# Patient Record
Sex: MALE | Race: WHITE | NOT HISPANIC OR LATINO | Employment: OTHER | URBAN - METROPOLITAN AREA
[De-identification: names, ages, dates, MRNs, and addresses within clinical notes are randomized per-mention and may not be internally consistent; named-entity substitution may affect disease eponyms.]

---

## 2014-03-01 LAB — EJECTION FRACTION: 63

## 2017-03-24 ENCOUNTER — TRANSFERRED RECORDS (OUTPATIENT)
Dept: HEALTH INFORMATION MANAGEMENT | Facility: CLINIC | Age: 77
End: 2017-03-24

## 2017-07-05 ENCOUNTER — DOCUMENTATION ONLY (OUTPATIENT)
Dept: LAB | Facility: CLINIC | Age: 77
End: 2017-07-05

## 2017-07-05 DIAGNOSIS — I10 ESSENTIAL HYPERTENSION WITH GOAL BLOOD PRESSURE LESS THAN 140/90: ICD-10-CM

## 2017-07-05 DIAGNOSIS — E78.5 HYPERLIPIDEMIA LDL GOAL <100: Primary | ICD-10-CM

## 2017-07-05 DIAGNOSIS — M1A.00X0 IDIOPATHIC CHRONIC GOUT WITHOUT TOPHUS, UNSPECIFIED SITE: ICD-10-CM

## 2017-07-12 DIAGNOSIS — E78.5 HYPERLIPIDEMIA LDL GOAL <100: ICD-10-CM

## 2017-07-12 DIAGNOSIS — I10 ESSENTIAL HYPERTENSION WITH GOAL BLOOD PRESSURE LESS THAN 140/90: ICD-10-CM

## 2017-07-12 DIAGNOSIS — M1A.00X0 IDIOPATHIC CHRONIC GOUT WITHOUT TOPHUS, UNSPECIFIED SITE: ICD-10-CM

## 2017-07-12 LAB
ANION GAP SERPL CALCULATED.3IONS-SCNC: 8 MMOL/L (ref 3–14)
BUN SERPL-MCNC: 17 MG/DL (ref 7–30)
CALCIUM SERPL-MCNC: 9.4 MG/DL (ref 8.5–10.1)
CHLORIDE SERPL-SCNC: 105 MMOL/L (ref 94–109)
CHOLEST SERPL-MCNC: 126 MG/DL
CO2 SERPL-SCNC: 28 MMOL/L (ref 20–32)
CREAT SERPL-MCNC: 1 MG/DL (ref 0.66–1.25)
GFR SERPL CREATININE-BSD FRML MDRD: 73 ML/MIN/1.7M2
GLUCOSE SERPL-MCNC: 91 MG/DL (ref 70–99)
HDLC SERPL-MCNC: 55 MG/DL
LDLC SERPL CALC-MCNC: 53 MG/DL
NONHDLC SERPL-MCNC: 71 MG/DL
POTASSIUM SERPL-SCNC: 3.9 MMOL/L (ref 3.4–5.3)
SODIUM SERPL-SCNC: 141 MMOL/L (ref 133–144)
TRIGL SERPL-MCNC: 92 MG/DL
URATE SERPL-MCNC: 8.2 MG/DL (ref 3.5–7.2)

## 2017-07-12 PROCEDURE — 80048 BASIC METABOLIC PNL TOTAL CA: CPT | Performed by: FAMILY MEDICINE

## 2017-07-12 PROCEDURE — 80061 LIPID PANEL: CPT | Performed by: FAMILY MEDICINE

## 2017-07-12 PROCEDURE — 84550 ASSAY OF BLOOD/URIC ACID: CPT | Performed by: FAMILY MEDICINE

## 2017-07-12 PROCEDURE — 36415 COLL VENOUS BLD VENIPUNCTURE: CPT | Performed by: FAMILY MEDICINE

## 2017-07-14 DIAGNOSIS — E78.5 HYPERLIPIDEMIA LDL GOAL <100: ICD-10-CM

## 2017-07-14 RX ORDER — ATORVASTATIN CALCIUM 40 MG/1
TABLET, FILM COATED ORAL
Qty: 90 TABLET | Refills: 2 | Status: SHIPPED | OUTPATIENT
Start: 2017-07-14 | End: 2017-07-19

## 2017-07-14 NOTE — TELEPHONE ENCOUNTER
Routing refill request to provider for review/approval because:  Plan not noted.    Notes Recorded by Cabrera Bradshaw MD on 7/13/2017 at 7:44 AM  Patient will be coming in soon for office visit and lab/test review.      Shilpa Shirley RN CPC Triage.

## 2017-07-14 NOTE — PROGRESS NOTES
SUBJECTIVE:   Samy Henry is a 77 year old male who presents for a Preventive Visit and to follow-up on some baseline health conditions.  Are you in the first 12 months of your Medicare coverage?  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 He dropped out forms yesterday from the [employer] that he wonders if I could complete. They have changed the rules now and regular doctors can complete the forms without being specifically certified for this.  He does have a  history of heart disease, but had a stress test in August of last year which he passed. He does see cardiology on a regular basis and he was cleared by them last September. He'll be seeing them again in a couple of months.    COGNITIVE SCREEN  1) Repeat 3 items (Banana, Sunrise, Chair)    2) Clock draw: NORMAL  3) 3 item recall: Recalls 3 objects  Results: 3 items recalled: COGNITIVE IMPAIRMENT LESS LIKELY    Mini-CogTM Copyright S Jos. Licensed by the author for use in [facility]; reprinted with permission (brad@.[email or web address]). All rights reserved.          Reviewed and updated as needed this visit by clinical staffTobacco  Allergies  Med Hx  Surg Hx  Fam Hx  Soc Hx        Reviewed and updated as needed this visit by Provider        Social History   Substance Use Topics     Smoking status: Former Smoker     Packs/day: 1.00     Types: Cigarettes     Start date: 1/1/1959     Quit date: 1/1/1974     Smokeless tobacco: Never Used     Alcohol use Yes      Comment: 2 glasses of wine per day        The  patient does not drink >3 drinks per day nor >7 drinks per week.          Today's PHQ-2 Score: PHQ-2 ( 1999 Pfizer) 7/14/2017   Q1: Little interest or pleasure in doing things 0   Q2: Feeling down, depressed or hopeless 0   PHQ-2 Score 0   Q1: Little interest or pleasure in doing things Not at all   Q2: Feeling down, depressed or hopeless Not at all   PHQ-2 Score 0       Do you feel safe in your environment - Yes    Do you have a Health Care Directive?: No: Advance care planning reviewed with patient; information given to patient to review.      Current providers sharing in care for this patient include: Patient Care Team:  Cabrera Bradshaw MD as PCP - General      Hearing impairment: No    Ability to successfully perform activities of daily living: Yes, no assistance needed     Fall risk:  Fallen 2 or more times in the past year?: No  Any fall with injury in the past year?: No      Home safety:  none identified      The following health maintenance items are reviewed in Epic and correct as of today:Health Maintenance   Topic Date Due     ADVANCE DIRECTIVE PLANNING Q5 YRS  06/01/2017     FALL RISK ASSESSMENT  07/18/2017     INFLUENZA VACCINE (SYSTEM ASSIGNED)  09/01/2017     BMP Q1 YR  07/12/2018     LIPID MONITORING Q1 YEAR  07/12/2018     TETANUS IMMUNIZATION (SYSTEM ASSIGNED)  05/08/2019     COLONOSCOPY Q5 YR  08/31/2020     PNEUMOCOCCAL  Completed     Patient Active Problem List   Diagnosis     Colorectal polyps     Advanced directives, counseling/discussion     Hyperlipidemia LDL goal <100     History of non-ST elevation myocardial infarction (NSTEMI)     FHx: prostate cancer     Essential hypertension with goal blood pressure less than 140/90     Idiopathic chronic gout without tophus, unspecified site     Coronary artery disease involving native coronary artery of native heart without angina pectoris     Past Surgical History:   Procedure Laterality Date     CATARACT IOL, RT/LT  06/2009    right     CATARACT  IOL, RT/LT  08/13    left     COLONOSCOPY       COLONOSCOPY N/A 8/31/2015    Procedure: COLONOSCOPY;  Surgeon: Francisco Horowitz MD;  Location: [facility] OR     COLONOSCOPY WITH CO2 INSUFFLATION N/A 8/31/2015    Procedure: COLONOSCOPY WITH CO2 INSUFFLATION;  Surgeon: Francisco Horowitz MD;  Location: [facility] OR     removal of rectal polyp  08/10     STENT, CORONARY, SAMANTA  10/13    3 coronary stents s/p NSTEMI     TONSILLECTOMY & ADENOIDECTOMY  1945     VASECTOMY  04/07/78       Social History   Substance Use Topics     Smoking status: Former Smoker     Packs/day: 1.00     Types: Cigarettes     Start date: 1/1/1959     Quit date: 1/1/1974     Smokeless tobacco: Never Used     Alcohol use Yes      Comment: 2 glasses of wine per day      Family History   Problem Relation Age of Onset     Hypertension Mother      Breast Cancer Mother      Cancer - colorectal Father      Prostate Cancer Father      CANCER Brother      lymphoma     Hypertension Sister      Neurologic Disorder Maternal Grandmother      CANCER Paternal Grandfather          Current Outpatient Prescriptions   Medication Sig Dispense Refill     atorvastatin (LIPITOR) 40 MG tablet Take 1 tablet (40 mg) by mouth daily 90 tablet 3     lisinopril-hydrochlorothiazide (PRINZIDE/ZESTORETIC) 20-25 MG per tablet Take 1 tablet by mouth daily 90 tablet 3     metoprolol (TOPROL-XL) 50 MG 24 hr tablet Take 1 tablet (50 mg) by mouth daily 90 tablet 3     indomethacin (INDOCIN) 50 MG capsule Take 1 capsule by mouth. Up to 3 times per day as needed for gout 30 capsule 1     nitroglycerin (NITROSTAT) 0.4 MG SL tablet Place under the tongue every 5 minutes as needed       ASPIRIN 81 MG PO TABS 1 TABLET DAILY       MULTI-VITAMIN PO TABS 1 TABLET DAILY       [DISCONTINUED] atorvastatin (LIPITOR) 40 MG tablet TAKE 1 TABLET (40 MG) BY ORAL ROUTE ONCE DAILY 90 tablet 2     [DISCONTINUED] lisinopril-hydrochlorothiazide (PRINZIDE,ZESTORETIC) 20-25 MG per tablet Take 1 tablet by mouth  "daily 90 tablet 3     [DISCONTINUED] metoprolol (TOPROL-XL) 50 MG 24 hr tablet Take 1 tablet (50 mg) by mouth daily 90 tablet 3     No Known Allergies        ROS:  C: NEGATIVE for fever, chills, change in weight  I: NEGATIVE for worrisome rashes, moles or lesions  E: NEGATIVE for vision changes or irritation; he had an eye exam last month, so is up-to-date with that.  E/M: NEGATIVE for ear, mouth and throat problems; no particular hearing concerns  R: NEGATIVE for significant cough or SOB  B: NEGATIVE for masses, tenderness or discharge  CV: NEGATIVE for chest pain, palpitations or peripheral edema  GI: NEGATIVE for nausea, abdominal pain, heartburn, or change in bowel habits  : NEGATIVE for frequency, dysuria, or hematuria  M: NEGATIVE for significant arthralgias or myalgia; he gets 1 or 2 gout episodes per year  N: NEGATIVE for weakness, dizziness or paresthesias  E: NEGATIVE for temperature intolerance, skin/hair changes  H: NEGATIVE for bleeding problems  P: NEGATIVE for changes in mood or affect    OBJECTIVE:   /82 (BP Location: Right arm, Patient Position: Chair, Cuff Size: Adult Regular)  Pulse 65  Temp 96.8  F (36  C) (Oral)  Ht 5' 10\" (1.778 m)  Wt 183 lb (83 kg)  SpO2 98%  BMI 26.26 kg/m2 Estimated body mass index is 26.26 kg/(m^2) as calculated from the following:    Height as of this encounter: 5' 10\" (1.778 m).    Weight as of this encounter: 183 lb (83 kg).  EXAM:   GENERAL: healthy, alert and no distress  EYES: Eyes grossly normal to inspection, PERRL and conjunctivae and sclerae normal  HENT: ear canals and TM's normal, nose and mouth without ulcers or lesions  NECK: no adenopathy, no asymmetry, masses, or scars and thyroid normal to palpation  RESP: lungs clear to auscultation - no rales, rhonchi or wheezes  CV: regular rate and rhythm, normal S1 S2, no S3 or S4, no murmur, click or rub, no peripheral edema and peripheral pulses strong  ABDOMEN: soft, nontender, no hepatosplenomegaly, " no masses    (male): normal male genitalia without lesions or urethral discharge, no hernia  RECTAL: normal sphincter tone, no rectal masses, prostate normal size, smooth, nontender without nodules or masses  MS: no gross musculoskeletal defects noted, no edema  SKIN: no suspicious lesions or rashes  NEURO: Normal strength and tone, mentation intact and speech normal  PSYCH: mentation appears normal, affect normal/bright    Orders Only on 07/12/2017   Component Date Value Ref Range Status     Sodium 07/12/2017 141  133 - 144 mmol/L Final     Potassium 07/12/2017 3.9  3.4 - 5.3 mmol/L Final     Chloride 07/12/2017 105  94 - 109 mmol/L Final     Carbon Dioxide 07/12/2017 28  20 - 32 mmol/L Final     Anion Gap 07/12/2017 8  3 - 14 mmol/L Final     Glucose 07/12/2017 91  70 - 99 mg/dL Final    Fasting specimen     Urea Nitrogen 07/12/2017 17  7 - 30 mg/dL Final     Creatinine 07/12/2017 1.00  0.66 - 1.25 mg/dL Final     GFR Estimate 07/12/2017 73  >60 mL/min/1.7m2 Final    Non  GFR Calc     GFR Estimate If Black 07/12/2017 88  >60 mL/min/1.7m2 Final    African American GFR Calc     Calcium 07/12/2017 9.4  8.5 - 10.1 mg/dL Final     Cholesterol 07/12/2017 126  <200 mg/dL Final     Triglycerides 07/12/2017 92  <150 mg/dL Final    Fasting specimen     HDL Cholesterol 07/12/2017 55  >39 mg/dL Final     LDL Cholesterol Calculated 07/12/2017 53  <100 mg/dL Final    Desirable:       <100 mg/dl     Non HDL Cholesterol 07/12/2017 71  <130 mg/dL Final     Uric Acid 07/12/2017 8.2* 3.5 - 7.2 mg/dL Final         ASSESSMENT / PLAN:       ICD-10-CM    1. Routine general medical examination at a health care facility Z00.00    2. Hyperlipidemia LDL goal <100 E78.5 atorvastatin (LIPITOR) 40 MG tablet   3. Essential hypertension with goal blood pressure less than 140/90 I10 lisinopril-hydrochlorothiazide (PRINZIDE/ZESTORETIC) 20-25 MG per tablet     metoprolol (TOPROL-XL) 50 MG 24 hr tablet   4. Coronary artery disease  "involving native coronary artery of native heart without angina pectoris I25.10    5. Hyperuricemia E79.0    6. Idiopathic chronic gout without tophus, unspecified site [id number].00X0    7. Advanced directives, counseling/discussion Z71.89      Blood pressure and other vital signs look good  Laboratory tests are all normal except for the mildly elevated uric acid         Discussed that his HCTZ could be contributing to that, but he seems to be tolerating the blood pressure well and I think is benefiting from the blood pressure lowering effect of HCTZ          Since gout has not been much of a problem for him, we will continue his same blood pressure medication          If he starts having more episodes of gout, then we may discontinue the HCTZ and increase his lisinopril dose or change him to generic Lotrel as an alternative  For now, continue same baseline meds  Continue regular physical activity  Continue routine cardiology follow-up  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  Practitioner is supportive of decision.    COUNSELING:  Reviewed preventive health counseling, as reflected in patient instructions       Regular exercise       Healthy diet/nutrition      Estimated body mass index is 26.26 kg/(m^2) as calculated from the following:    Height as of this encounter: 5' 10\" (1.778 m).    Weight as of this encounter: 183 lb (83 kg).     reports that he quit smoking about 43 years ago. His smoking use included Cigarettes. He started smoking about 58 years ago. He smoked 1.00 pack per day. He has never used smokeless tobacco.        Appropriate preventive services were discussed with this patient, including applicable screening as appropriate for cardiovascular disease, diabetes, osteopenia/osteoporosis, and glaucoma.  As appropriate for " age/gender, discussed screening for colorectal cancer, prostate cancer, breast cancer, and cervical cancer. Checklist reviewing preventive services available has been given to the patient.    Reviewed patients plan of care and provided an AVS. The Basic Care Plan (routine screening as documented in Health Maintenance) for Samy meets the Care Plan requirement. This Care Plan has been established and reviewed with the Patient.    Counseling Resources:  ATP IV Guidelines  Pooled Cohorts Equation Calculator  Breast Cancer Risk Calculator  FRAX Risk Assessment  ICSI Preventive Guidelines  Dietary Guidelines for Americans, 2010  USDA's MyPlate  ASA Prophylaxis  Lung CA Screening    Cabrera Bradshaw MD  [facility]    Answers for HPI/ROS submitted by the patient on 7/14/2017   PHQ-2 Score: 0

## 2017-07-14 NOTE — TELEPHONE ENCOUNTER
atorvastatin (LIPITOR) 40 MG tablet     Last Written Prescription Date: 7/18/16  Last Fill Quantity: 90, # refills: 3  Last Office Visit with G, P or [facility] prescribing provider: 7/18/16  Next 5 appointments (look out 90 days)     Jul 19, 2017 11:20 AM CDT   PHYSICAL with Cabrera Bradshaw MD   [facility]    [address] [place] [id number] [phone]                   Lab Results   Component Value Date    CHOL 126 07/12/2017     Lab Results   Component Value Date    HDL 55 07/12/2017     Lab Results   Component Value Date    LDL 53 07/12/2017     Lab Results   Component Value Date    TRIG 92 07/12/2017     Lab Results   Component Value Date    CHOLHDLRATIO 2.1 07/09/2015

## 2017-07-18 ENCOUNTER — TELEPHONE (OUTPATIENT)
Dept: FAMILY MEDICINE | Facility: CLINIC | Age: 77
End: 2017-07-18

## 2017-07-18 NOTE — TELEPHONE ENCOUNTER
Reason for Call:  Form, our goal is to have forms completed with 72 hours, however, some forms may require a visit or additional information.    Type of letter, form or note:  medical    Who is the form from?: Patient    Where did the form come from: Patient or family brought in       What clinic location was the form placed at?: [facility])    Where the form was placed: 's Box    What number is listed as a contact on the form?:  [phone]       Additional comments: no    Call taken on 7/18/2017 at 2:54 PM by Karla Houston

## 2017-07-19 ENCOUNTER — OFFICE VISIT (OUTPATIENT)
Dept: FAMILY MEDICINE | Facility: CLINIC | Age: 77
End: 2017-07-19
Payer: COMMERCIAL

## 2017-07-19 VITALS
DIASTOLIC BLOOD PRESSURE: 82 MMHG | TEMPERATURE: 96.8 F | BODY MASS INDEX: 26.2 KG/M2 | HEIGHT: 70 IN | WEIGHT: 183 LBS | HEART RATE: 65 BPM | OXYGEN SATURATION: 98 % | SYSTOLIC BLOOD PRESSURE: 137 MMHG

## 2017-07-19 DIAGNOSIS — Z00.00 ROUTINE GENERAL MEDICAL EXAMINATION AT A HEALTH CARE FACILITY: Primary | ICD-10-CM

## 2017-07-19 DIAGNOSIS — I10 ESSENTIAL HYPERTENSION WITH GOAL BLOOD PRESSURE LESS THAN 140/90: ICD-10-CM

## 2017-07-19 DIAGNOSIS — I25.10 CORONARY ARTERY DISEASE INVOLVING NATIVE CORONARY ARTERY OF NATIVE HEART WITHOUT ANGINA PECTORIS: ICD-10-CM

## 2017-07-19 DIAGNOSIS — M1A.00X0 IDIOPATHIC CHRONIC GOUT WITHOUT TOPHUS, UNSPECIFIED SITE: ICD-10-CM

## 2017-07-19 DIAGNOSIS — Z71.89 ADVANCED DIRECTIVES, COUNSELING/DISCUSSION: ICD-10-CM

## 2017-07-19 DIAGNOSIS — E78.5 HYPERLIPIDEMIA LDL GOAL <100: ICD-10-CM

## 2017-07-19 DIAGNOSIS — E79.0 HYPERURICEMIA: ICD-10-CM

## 2017-07-19 PROCEDURE — 99213 OFFICE O/P EST LOW 20 MIN: CPT | Mod: 25 | Performed by: FAMILY MEDICINE

## 2017-07-19 PROCEDURE — 99397 PER PM REEVAL EST PAT 65+ YR: CPT | Performed by: FAMILY MEDICINE

## 2017-07-19 RX ORDER — ATORVASTATIN CALCIUM 40 MG/1
40 TABLET, FILM COATED ORAL DAILY
Qty: 90 TABLET | Refills: 3 | Status: SHIPPED | OUTPATIENT
Start: 2017-07-19 | End: 2018-07-20

## 2017-07-19 RX ORDER — LISINOPRIL AND HYDROCHLOROTHIAZIDE 20; 25 MG/1; MG/1
1 TABLET ORAL DAILY
Qty: 90 TABLET | Refills: 3 | Status: SHIPPED | OUTPATIENT
Start: 2017-07-19 | End: 2018-07-20

## 2017-07-19 RX ORDER — METOPROLOL SUCCINATE 50 MG/1
50 TABLET, EXTENDED RELEASE ORAL DAILY
Qty: 90 TABLET | Refills: 3 | Status: SHIPPED | OUTPATIENT
Start: 2017-07-19 | End: 2018-07-20

## 2017-07-19 NOTE — MR AVS SNAPSHOT
After Visit Summary   7/19/2017    Samy Henry    MRN: [id number]           Patient Information     Date Of Birth          1940        Visit Information        Provider Department      7/19/2017 11:20 AM Cabrera Bradshaw MD [facility]        Today's Diagnoses     Routine general medical examination at a health care facility    -  1    Hyperlipidemia LDL goal <100        Essential hypertension with goal blood pressure less than 140/90        Coronary artery disease involving native coronary artery of native heart without angina pectoris        Hyperuricemia        Idiopathic chronic gout without tophus, unspecified site        Advanced directives, counseling/discussion          Care Instructions      Preventive Health Recommendations:       Male Ages 65 and over    Yearly exam:             See your health care provider every year in order to  o   Review health changes.   o   Discuss preventive care.    o   Review your medicines if your doctor has prescribed any.    Talk with your health care provider about whether you should have a test to screen for prostate cancer (PSA).    Every 3 years, have a diabetes test (fasting glucose). If you are at risk for diabetes, you should have this test more often.    Every 5 years, have a cholesterol test. Have this test more often if you are at risk for high cholesterol or heart disease.     Every 10 years, have a colonoscopy. Or, have a yearly FIT test (stool test). These exams will check for colon cancer.    Talk to with your health care provider about screening for Abdominal Aortic Aneurysm if you have a family history of AAA or have a history of smoking.  Shots:     Get a flu shot each year.     Get a tetanus shot every 10 years.     Talk to your doctor about your pneumonia vaccines. There are now two you should receive - Pneumovax (PPSV 23) and Prevnar (PCV 13).    Talk to your doctor about a shingles vaccine.     Talk to your  doctor about the hepatitis B vaccine.  Nutrition:     Eat at least 5 servings of fruits and vegetables each day.     Eat whole-grain bread, whole-wheat pasta and brown rice instead of white grains and rice.     Talk to your doctor about Calcium and Vitamin D.   Lifestyle    Exercise for at least 150 minutes a week (30 minutes a day, 5 days a week). This will help you control your weight and prevent disease.     Limit alcohol to one drink per day.     No smoking.     Wear sunscreen to prevent skin cancer.     See your dentist every six months for an exam and cleaning.     See your eye doctor every 1 to 2 years to screen for conditions such as glaucoma, macular degeneration and cataracts.          Follow-ups after your visit        Follow-up notes from your care team     Return in about 1 year (around 7/19/2018).      Who to contact     If you have questions or need follow up information about today's clinic visit or your schedule please contact [facility] directly at [phone].  Normal or non-critical lab and imaging results will be communicated to you by CardioLogshart, letter or phone within 4 business days after the clinic has received the results. If you do not hear from us within 7 days, please contact the clinic through Cabana or phone. If you have a critical or abnormal lab result, we will notify you by phone as soon as possible.  Submit refill requests through Cabana or call your pharmacy and they will forward the refill request to us. Please allow 3 business days for your refill to be completed.          Additional Information About Your Visit        Cabana Information     Cabana gives you secure access to your electronic health record. If you see a primary care provider, you can also send messages to your care team and make appointments. If you have questions, please call your primary care clinic.  If you do not have a primary care provider, please call [phone] and they will  "assist you.        Care EveryWhere ID     This is your Care EveryWhere ID. This could be used by other organizations to access your [facility] medical records  [id number]        Your Vitals Were     Pulse Temperature Height Pulse Oximetry BMI (Body Mass Index)       65 96.8  F (36  C) (Oral) 5' 10\" (1.778 m) 98% 26.26 kg/m2        Blood Pressure from Last 3 Encounters:   07/19/17 137/82   07/18/16 144/81   08/31/15 140/80    Weight from Last 3 Encounters:   07/19/17 183 lb (83 kg)   07/18/16 186 lb (84.4 kg)   07/14/15 185 lb (83.9 kg)              Today, you had the following     No orders found for display         Today's Medication Changes          These changes are accurate as of: 7/19/17 11:57 AM.  If you have any questions, ask your nurse or doctor.               These medicines have changed or have updated prescriptions.        Dose/Directions    atorvastatin 40 MG tablet   Commonly known as:  LIPITOR   This may have changed:  See the new instructions.   Used for:  Hyperlipidemia LDL goal <100   Changed by:  Cabrera Bradshaw MD        Dose:  40 mg   Take 1 tablet (40 mg) by mouth daily   Quantity:  90 tablet   Refills:  3            Where to get your medicines      These medications were sent to [facility] [address] [place], [place] [id number]     Phone:  [phone]     atorvastatin 40 MG tablet    lisinopril-hydrochlorothiazide 20-25 MG per tablet    metoprolol 50 MG 24 hr tablet                Primary Care Provider Office Phone # Fax #    Cabrera Bradshaw [phone]       [facility] [address] [facility] [address]        Equal Access to Services     [facility] DEEPIKA AH: Hadii wilma hougho Socarolyn, waaxda luqadaha, qaybta kaalmada adeegyada, maria teresa layton haychicon lisha dodson. So [facility] [phone].    ATENCIÓN: Si habla español, tiene a oneal disposición servicios gratuitos de asistencia lingüística. " Sherman seo [phone].    We comply with applicable federal civil rights laws and [place] laws. We do not discriminate on the basis of race, color, national origin, age, disability sex, sexual orientation or gender identity.            Thank you!     Thank you for choosing [facility]  for your care. Our goal is always to provide you with excellent care. Hearing back from our patients is one way we can continue to improve our services. Please take a few minutes to complete the written survey that you may receive in the mail after your visit with us. Thank you!             Your Updated Medication List - Protect others around you: Learn how to safely use, store and throw away your medicines at www.disposemymeds.org.          This list is accurate as of: 7/19/17 11:57 AM.  Always use your most recent med list.                   Brand Name Dispense Instructions for use Diagnosis    aspirin 81 MG tablet      1 TABLET DAILY        atorvastatin 40 MG tablet    LIPITOR    90 tablet    Take 1 tablet (40 mg) by mouth daily    Hyperlipidemia LDL goal <100       indomethacin 50 MG capsule    INDOCIN    30 capsule    Take 1 capsule by mouth. Up to 3 times per day as needed for gout    Idiopathic chronic gout without tophus, unspecified site       lisinopril-hydrochlorothiazide 20-25 MG per tablet    PRINZIDE/ZESTORETIC    90 tablet    Take 1 tablet by mouth daily    Essential hypertension with goal blood pressure less than 140/90       metoprolol 50 MG 24 hr tablet    TOPROL-XL    90 tablet    Take 1 tablet (50 mg) by mouth daily    Essential hypertension with goal blood pressure less than 140/90       Multi-vitamin Tabs tablet   Generic drug:  multivitamin, therapeutic with minerals      1 TABLET DAILY        nitroGLYcerin 0.4 MG sublingual tablet    NITROSTAT     Place under the tongue every 5 minutes as needed

## 2017-07-19 NOTE — NURSING NOTE
"Chief Complaint   Patient presents with     Wellness Visit       Initial /82 (BP Location: Right arm, Patient Position: Chair, Cuff Size: Adult Regular)  Pulse 65  Temp 96.8  F (36  C) (Oral)  Ht 5' 10\" (1.778 m)  Wt 183 lb (83 kg)  SpO2 98%  BMI 26.26 kg/m2 Estimated body mass index is 26.26 kg/(m^2) as calculated from the following:    Height as of this encounter: 5' 10\" (1.778 m).    Weight as of this encounter: 183 lb (83 kg).  Medication Reconciliation: complete   Ivory Best CMA       "

## 2017-08-18 ENCOUNTER — TRANSFERRED RECORDS (OUTPATIENT)
Dept: HEALTH INFORMATION MANAGEMENT | Facility: CLINIC | Age: 77
End: 2017-08-18

## 2017-10-02 ENCOUNTER — OFFICE VISIT (OUTPATIENT)
Dept: FAMILY MEDICINE | Facility: CLINIC | Age: 77
End: 2017-10-02
Payer: COMMERCIAL

## 2017-10-02 VITALS
OXYGEN SATURATION: 98 % | WEIGHT: 188 LBS | BODY MASS INDEX: 26.98 KG/M2 | HEART RATE: 60 BPM | TEMPERATURE: 97 F | DIASTOLIC BLOOD PRESSURE: 73 MMHG | SYSTOLIC BLOOD PRESSURE: 143 MMHG

## 2017-10-02 DIAGNOSIS — K14.8 HEMORRHAGE OF TONGUE: Primary | ICD-10-CM

## 2017-10-02 DIAGNOSIS — Z23 NEED FOR PROPHYLACTIC VACCINATION AND INOCULATION AGAINST INFLUENZA: ICD-10-CM

## 2017-10-02 DIAGNOSIS — I10 ESSENTIAL HYPERTENSION WITH GOAL BLOOD PRESSURE LESS THAN 140/90: ICD-10-CM

## 2017-10-02 PROCEDURE — 90662 IIV NO PRSV INCREASED AG IM: CPT | Performed by: FAMILY MEDICINE

## 2017-10-02 PROCEDURE — 99214 OFFICE O/P EST MOD 30 MIN: CPT | Mod: 25 | Performed by: FAMILY MEDICINE

## 2017-10-02 PROCEDURE — G0008 ADMIN INFLUENZA VIRUS VAC: HCPCS | Performed by: FAMILY MEDICINE

## 2017-10-02 NOTE — NURSING NOTE
"Chief Complaint   Patient presents with     Mouth Problem     Occasional bleeding of the tongue     Health Maintenance     Flu shot     Flu Shot       Initial /79 (BP Location: Right arm, Patient Position: Chair, Cuff Size: Adult Regular)  Pulse 60  Temp 97  F (36.1  C) (Oral)  Wt 188 lb (85.3 kg)  SpO2 98%  BMI 26.98 kg/m2 Estimated body mass index is 26.98 kg/(m^2) as calculated from the following:    Height as of 7/19/17: 5' 10\" (1.778 m).    Weight as of this encounter: 188 lb (85.3 kg).  Medication Reconciliation: complete   Ivory Best CMA       "

## 2017-10-02 NOTE — MR AVS SNAPSHOT
After Visit Summary   10/2/2017    Samy Henry    MRN: [id number]           Patient Information     Date Of Birth          1940        Visit Information        Provider Department      10/2/2017 10:00 AM Cabrera Bradshaw MD [facility]        Today's Diagnoses     Hemorrhage of tongue    -  1    Essential hypertension with goal blood pressure less than 140/90        Need for prophylactic vaccination and inoculation against influenza           Follow-ups after your visit        Follow-up notes from your care team     Return if symptoms worsen or fail to improve.      Who to contact     If you have questions or need follow up information about today's clinic visit or your schedule please contact [facility] directly at [phone].  Normal or non-critical lab and imaging results will be communicated to you by MyChart, letter or phone within 4 business days after the clinic has received the results. If you do not hear from us within 7 days, please contact the clinic through Octoplushart or phone. If you have a critical or abnormal lab result, we will notify you by phone as soon as possible.  Submit refill requests through Labmeeting or call your pharmacy and they will forward the refill request to us. Please allow 3 business days for your refill to be completed.          Additional Information About Your Visit        MyChart Information     Labmeeting gives you secure access to your electronic health record. If you see a primary care provider, you can also send messages to your care team and make appointments. If you have questions, please call your primary care clinic.  If you do not have a primary care provider, please call [phone] and they will assist you.        Care EveryWhere ID     This is your Care EveryWhere ID. This could be used by other organizations to access your [facility] medical records  [id number]        Your Vitals Were     Pulse  Temperature Pulse Oximetry BMI (Body Mass Index)          60 97  F (36.1  C) (Oral) 98% 26.98 kg/m2         Blood Pressure from Last 3 Encounters:   10/02/17 154/79   07/19/17 137/82   07/18/16 144/81    Weight from Last 3 Encounters:   10/02/17 188 lb (85.3 kg)   07/19/17 183 lb (83 kg)   07/18/16 186 lb (84.4 kg)              We Performed the Following     ADMIN INFLUENZA (For MEDICARE Patients ONLY) []     FLU VACCINE, INCREASED ANTIGEN, PRESV FREE, AGE 65+ [98712]        Primary Care Provider Office Phone # Fax #    Cabrera Bradshaw [phone]       [address] [facility] [address]        Equal Access to Services     IRAM POE : Hadii aad ku hadasho Socarolyn, waaxda luqadaha, qaybta kaalmada adeegyada, maria teresa johnson . So [facility] [phone].    ATENCIÓN: Si habla español, tiene a oneal disposición servicios gratuitos de asistencia lingüística. Llame al [phone].    We comply with applicable federal civil rights laws and [place] laws. We do not discriminate on the basis of race, color, national origin, age, disability, sex, sexual orientation, or gender identity.            Thank you!     Thank you for choosing [facility]  for your care. Our goal is always to provide you with excellent care. Hearing back from our patients is one way we can continue to improve our services. Please take a few minutes to complete the written survey that you may receive in the mail after your visit with us. Thank you!             Your Updated Medication List - Protect others around you: Learn how to safely use, store and throw away your medicines at www.disposemymeds.org.          This list is accurate as of: 10/2/17 10:43 AM.  Always use your most recent med list.                   Brand Name Dispense Instructions for use Diagnosis    aspirin 81 MG tablet      1 TABLET DAILY        atorvastatin 40 MG tablet    LIPITOR    90 tablet    Take 1  tablet (40 mg) by mouth daily    Hyperlipidemia LDL goal <100       indomethacin 50 MG capsule    INDOCIN    30 capsule    Take 1 capsule by mouth. Up to 3 times per day as needed for gout    Idiopathic chronic gout without tophus, unspecified site       lisinopril-hydrochlorothiazide 20-25 MG per tablet    PRINZIDE/ZESTORETIC    90 tablet    Take 1 tablet by mouth daily    Essential hypertension with goal blood pressure less than 140/90       metoprolol 50 MG 24 hr tablet    TOPROL-XL    90 tablet    Take 1 tablet (50 mg) by mouth daily    Essential hypertension with goal blood pressure less than 140/90       Multi-vitamin Tabs tablet   Generic drug:  multivitamin, therapeutic with minerals      1 TABLET DAILY        nitroGLYcerin 0.4 MG sublingual tablet    NITROSTAT     Place under the tongue every 5 minutes as needed

## 2017-10-02 NOTE — PROGRESS NOTES
SUBJECTIVE:   Samy Henry is a 77 year old male who presents to clinic today for the following health issues:      Concern - Occasional bleeding of the tongue  Onset: Saturday 9/30/17    Description:   Bleeding of the center of his tongue    Intensity: mild    Progression of Symptoms:  Happened on Saturday and again this morning    Accompanying Signs & Symptoms:  None    Previous history of similar problem:   No    Precipitating factors:   Worsened by: None    Alleviating factors:  Improved by:     Therapies Tried and outcome: None      2 days ago he noticed some bleeding from the center of his tongue. He wasn't sure exactly where it was coming from. There wasn't a particular spot on his tongue that he could see the using coming from. Yesterday it seemed fine, but then today he noticed a little bit of blood again in his mouth. He does get regular dental checkups twice a year. His oral health has generally been good. He is on low-dose aspirin daily.   He hasn't had any other bleeding issues recently from the nose, rectum, or elsewhere. No unusual signs of poor clotting from cuts or scrapes.    He hasn't had any known recent oral infection. He doesn't chew tobacco. It's been 40 years or so since he was a smoker.  He does have a history of hypertension. Home blood pressure readings have generally been good.    He hasn't had a flu shot yet this fall.    Problem list and histories reviewed & adjusted, as indicated.  Additional history: as documented    Patient Active Problem List   Diagnosis     Colorectal polyps     Advanced directives, counseling/discussion     Hyperlipidemia LDL goal <100     History of non-ST elevation myocardial infarction (NSTEMI)     FHx: prostate cancer     Essential hypertension with goal blood pressure less than 140/90     Idiopathic chronic gout without tophus, unspecified site     Coronary artery disease involving native coronary artery of native heart without angina pectoris     Past  Surgical History:   Procedure Laterality Date     CATARACT IOL, RT/LT  06/2009    right     CATARACT IOL, RT/LT  08/13    left     COLONOSCOPY       COLONOSCOPY N/A 8/31/2015    Procedure: COLONOSCOPY;  Surgeon: Francisco Horowitz MD;  Location: [facility] OR     COLONOSCOPY WITH CO2 INSUFFLATION N/A 8/31/2015    Procedure: COLONOSCOPY WITH CO2 INSUFFLATION;  Surgeon: Francisco Horowitz MD;  Location: [facility] OR     removal of rectal polyp  08/10     STENT, CORONARY, SAMANTA  10/13    3 coronary stents s/p NSTEMI     TONSILLECTOMY & ADENOIDECTOMY  1945     VASECTOMY  04/07/78       Social History   Substance Use Topics     Smoking status: Former Smoker     Packs/day: 1.00     Types: Cigarettes     Start date: 1/1/1959     Quit date: 1/1/1974     Smokeless tobacco: Never Used     Alcohol use Yes      Comment: 2 glasses of wine per day      Family History   Problem Relation Age of Onset     Hypertension Mother      Breast Cancer Mother      Cancer - colorectal Father      Prostate Cancer Father      CANCER Brother      lymphoma     Hypertension Sister      Neurologic Disorder Maternal Grandmother      CANCER Paternal Grandfather          Current Outpatient Prescriptions   Medication Sig Dispense Refill     atorvastatin (LIPITOR) 40 MG tablet Take 1 tablet (40 mg) by mouth daily 90 tablet 3     lisinopril-hydrochlorothiazide (PRINZIDE/ZESTORETIC) 20-25 MG per tablet Take 1 tablet by mouth daily 90 tablet 3     metoprolol (TOPROL-XL) 50 MG 24 hr tablet Take 1 tablet (50 mg) by mouth daily 90 tablet 3     indomethacin (INDOCIN) 50 MG capsule Take 1 capsule by mouth. Up to 3 times per day as needed for gout 30 capsule 1     nitroglycerin (NITROSTAT) 0.4 MG SL tablet Place under the tongue every 5 minutes as needed       ASPIRIN 81 MG PO TABS 1 TABLET DAILY       MULTI-VITAMIN PO TABS 1 TABLET DAILY       No Known Allergies      Reviewed and updated as needed this visit by clinical staffTobacco  Allergies  Med Hx  Surg Hx   Fam Hx  Soc Hx      Reviewed and updated as needed this visit by Provider         ROS:  C: NEGATIVE for fever, chills, change in weight  ENT/MOUTH: See above  R: NEGATIVE for significant cough or SOB  CV: NEGATIVE for chest pain, palpitations or peripheral edema    OBJECTIVE:     /73 (BP Location: Left arm, Patient Position: Chair, Cuff Size: Adult Regular)  Pulse 60  Temp 97  F (36.1  C) (Oral)  Wt 188 lb (85.3 kg)  SpO2 98%  BMI 26.98 kg/m2  Body mass index is 26.98 kg/(m^2).  GENERAL: healthy, alert and no distress  HENT: Intraoral exam currently is unremarkable. There is no bleeding anywhere identified inside of his mouth. His tongue appears clear without any apparent source of bleeding. He does have some scattered superficial blood vessels on the sides of his tongue and underneath his tongue as per usual.    Diagnostic Test Results:  none     ASSESSMENT/PLAN:       ICD-10-CM    1. Hemorrhage of tongue K14.8    2. Essential hypertension with goal blood pressure less than 140/90 I10    3. Need for prophylactic vaccination and inoculation against influenza Z23 FLU VACCINE, INCREASED ANTIGEN, PRESV FREE, AGE 65+ [08146]     ADMIN INFLUENZA (For MEDICARE Patients ONLY) []     He's had some mild bleeding from his tongue in the last couple of days which I suspect is from one of the superficial blood vessels identified  No ulcerations or signs of cancer or infection are seen          He would be at low risk for this given his lack of tobacco use  Blood pressure seems under reasonable control, so probably not a big contributing factor here  I suggested observation and expectant management           Discussed following a soft mechanical diet, perhaps with cold liquids and/or foods  If this continues to recur and persist, then we would probably refer him to ENT  We'll give him a flu shot today    If new, worsening or persistent symptoms, the patient is to call or return for a recheck (or ENT  referral)      Cabrera Bradshaw MD  [facility]      Injectable Influenza Immunization Documentation    1.  Is the person to be vaccinated sick today?   No    2. Does the person to be vaccinated have an allergy to a component   of the vaccine?   No    3. Has the person to be vaccinated ever had a serious reaction   to influenza vaccine in the past?   No    4. Has the person to be vaccinated ever had Guillain-Barré syndrome?   No    Form completed by Ivory Best CMA

## 2018-05-22 ENCOUNTER — TRANSFERRED RECORDS (OUTPATIENT)
Dept: HEALTH INFORMATION MANAGEMENT | Facility: CLINIC | Age: 78
End: 2018-05-22

## 2018-07-02 ENCOUNTER — DOCUMENTATION ONLY (OUTPATIENT)
Dept: LAB | Facility: CLINIC | Age: 78
End: 2018-07-02

## 2018-07-02 DIAGNOSIS — I10 ESSENTIAL HYPERTENSION WITH GOAL BLOOD PRESSURE LESS THAN 140/90: ICD-10-CM

## 2018-07-02 DIAGNOSIS — E78.5 HYPERLIPIDEMIA LDL GOAL <100: Primary | ICD-10-CM

## 2018-07-02 DIAGNOSIS — M1A.00X0 IDIOPATHIC CHRONIC GOUT WITHOUT TOPHUS, UNSPECIFIED SITE: ICD-10-CM

## 2018-07-02 NOTE — PROGRESS NOTES
Patient has an upcoming previsit appointment on 07/13/2018. Please review pended orders and add additional orders if needed.     Thank you,   Siria Casey

## 2018-07-13 DIAGNOSIS — E78.5 HYPERLIPIDEMIA LDL GOAL <100: ICD-10-CM

## 2018-07-13 DIAGNOSIS — I10 ESSENTIAL HYPERTENSION WITH GOAL BLOOD PRESSURE LESS THAN 140/90: ICD-10-CM

## 2018-07-13 DIAGNOSIS — M1A.00X0 IDIOPATHIC CHRONIC GOUT WITHOUT TOPHUS, UNSPECIFIED SITE: ICD-10-CM

## 2018-07-13 LAB
ALT SERPL W P-5'-P-CCNC: 38 U/L (ref 0–70)
ANION GAP SERPL CALCULATED.3IONS-SCNC: 9 MMOL/L (ref 3–14)
BUN SERPL-MCNC: 21 MG/DL (ref 7–30)
CALCIUM SERPL-MCNC: 9.2 MG/DL (ref 8.5–10.1)
CHLORIDE SERPL-SCNC: 104 MMOL/L (ref 94–109)
CHOLEST SERPL-MCNC: 117 MG/DL
CO2 SERPL-SCNC: 28 MMOL/L (ref 20–32)
CREAT SERPL-MCNC: 0.97 MG/DL (ref 0.66–1.25)
ERYTHROCYTE [DISTWIDTH] IN BLOOD BY AUTOMATED COUNT: 13.6 % (ref 10–15)
GFR SERPL CREATININE-BSD FRML MDRD: 75 ML/MIN/1.7M2
GLUCOSE SERPL-MCNC: 90 MG/DL (ref 70–99)
HCT VFR BLD AUTO: 44.7 % (ref 40–53)
HDLC SERPL-MCNC: 50 MG/DL
HGB BLD-MCNC: 15.2 G/DL (ref 13.3–17.7)
LDLC SERPL CALC-MCNC: 49 MG/DL
MCH RBC QN AUTO: 32.4 PG (ref 26.5–33)
MCHC RBC AUTO-ENTMCNC: 34 G/DL (ref 31.5–36.5)
MCV RBC AUTO: 95 FL (ref 78–100)
NONHDLC SERPL-MCNC: 67 MG/DL
PLATELET # BLD AUTO: 272 10E9/L (ref 150–450)
POTASSIUM SERPL-SCNC: 3.6 MMOL/L (ref 3.4–5.3)
RBC # BLD AUTO: 4.69 10E12/L (ref 4.4–5.9)
SODIUM SERPL-SCNC: 141 MMOL/L (ref 133–144)
TRIGL SERPL-MCNC: 92 MG/DL
URATE SERPL-MCNC: 8.2 MG/DL (ref 3.5–7.2)
WBC # BLD AUTO: 7.6 10E9/L (ref 4–11)

## 2018-07-13 PROCEDURE — 84460 ALANINE AMINO (ALT) (SGPT): CPT | Performed by: FAMILY MEDICINE

## 2018-07-13 PROCEDURE — 80061 LIPID PANEL: CPT | Performed by: FAMILY MEDICINE

## 2018-07-13 PROCEDURE — 36415 COLL VENOUS BLD VENIPUNCTURE: CPT | Performed by: FAMILY MEDICINE

## 2018-07-13 PROCEDURE — 84550 ASSAY OF BLOOD/URIC ACID: CPT | Performed by: FAMILY MEDICINE

## 2018-07-13 PROCEDURE — 85027 COMPLETE CBC AUTOMATED: CPT | Performed by: FAMILY MEDICINE

## 2018-07-13 PROCEDURE — 80048 BASIC METABOLIC PNL TOTAL CA: CPT | Performed by: FAMILY MEDICINE

## 2018-07-20 ENCOUNTER — OFFICE VISIT (OUTPATIENT)
Dept: FAMILY MEDICINE | Facility: CLINIC | Age: 78
End: 2018-07-20
Payer: COMMERCIAL

## 2018-07-20 VITALS
SYSTOLIC BLOOD PRESSURE: 162 MMHG | TEMPERATURE: 97.9 F | BODY MASS INDEX: 26.63 KG/M2 | WEIGHT: 186 LBS | DIASTOLIC BLOOD PRESSURE: 77 MMHG | HEART RATE: 57 BPM | HEIGHT: 70 IN | OXYGEN SATURATION: 97 %

## 2018-07-20 DIAGNOSIS — I10 ESSENTIAL HYPERTENSION WITH GOAL BLOOD PRESSURE LESS THAN 140/90: ICD-10-CM

## 2018-07-20 DIAGNOSIS — I25.10 CORONARY ARTERY DISEASE INVOLVING NATIVE CORONARY ARTERY OF NATIVE HEART WITHOUT ANGINA PECTORIS: ICD-10-CM

## 2018-07-20 DIAGNOSIS — M1A.00X0 IDIOPATHIC CHRONIC GOUT WITHOUT TOPHUS, UNSPECIFIED SITE: ICD-10-CM

## 2018-07-20 DIAGNOSIS — I25.2 HISTORY OF NON-ST ELEVATION MYOCARDIAL INFARCTION (NSTEMI): ICD-10-CM

## 2018-07-20 DIAGNOSIS — Z00.00 ROUTINE GENERAL MEDICAL EXAMINATION AT A HEALTH CARE FACILITY: Primary | ICD-10-CM

## 2018-07-20 DIAGNOSIS — E78.5 HYPERLIPIDEMIA LDL GOAL <100: ICD-10-CM

## 2018-07-20 PROCEDURE — G0439 PPPS, SUBSEQ VISIT: HCPCS | Performed by: FAMILY MEDICINE

## 2018-07-20 RX ORDER — ATORVASTATIN CALCIUM 40 MG/1
40 TABLET, FILM COATED ORAL DAILY
Qty: 90 TABLET | Refills: 3 | Status: SHIPPED | OUTPATIENT
Start: 2018-07-20 | End: 2019-07-23

## 2018-07-20 RX ORDER — LISINOPRIL AND HYDROCHLOROTHIAZIDE 20; 25 MG/1; MG/1
1 TABLET ORAL DAILY
Qty: 90 TABLET | Refills: 3 | Status: SHIPPED | OUTPATIENT
Start: 2018-07-20 | End: 2019-07-23

## 2018-07-20 RX ORDER — METOPROLOL SUCCINATE 50 MG/1
50 TABLET, EXTENDED RELEASE ORAL DAILY
Qty: 90 TABLET | Refills: 3 | Status: SHIPPED | OUTPATIENT
Start: 2018-07-20 | End: 2019-07-23

## 2018-07-20 ASSESSMENT — PAIN SCALES - GENERAL: PAINLEVEL: NO PAIN (0)

## 2018-07-20 ASSESSMENT — ACTIVITIES OF DAILY LIVING (ADL)
CURRENT_FUNCTION: NO ASSISTANCE NEEDED
I_NEED_ASSISTANCE_FOR_THE_FOLLOWING_DAILY_ACTIVITIES:: NO ASSISTANCE IS NEEDED

## 2018-07-20 NOTE — PROGRESS NOTES
SUBJECTIVE:   Samy Henry is a 78 year old male who presents for a Preventive Visit     Are you in the first 12 months of your Medicare coverage?  No    Physical   Annual:     Getting at least 3 servings of Calcium per day:  Yes    Bi-annual eye exam:  Yes    Dental care twice a year:  Yes    Sleep apnea or symptoms of sleep apnea:  None    Diet:  Regular (no restrictions)    Frequency of exercise:  2-3 days/week    Duration of exercise:  45-60 minutes    Taking medications regularly:  Yes    Medication side effects:  None    Additional concerns today:  No    Ability to successfully perform activities of daily living: no assistance needed    Home Safety:  Lack of grab bars in the bathroom    Hearing Impairment: no hearing concerns        Fall risk: none       COGNITIVE SCREEN  1) Repeat 3 items (Leader, Season, Table)    2) Clock draw: NORMAL  3) 3 item recall: Recalls 3 objects  Results: NORMAL clock, 1-2 items recalled: COGNITIVE IMPAIRMENT LESS LIKELY    Mini-CogTM Copyright S Jos. Licensed by the author for use in [facility]; reprinted with permission (brad@[email or web address]). All rights reserved.        Reviewed and updated as needed this visit by clinical staff  Tobacco  Allergies  Meds  Med Hx  Surg Hx  Fam Hx  Soc Hx        Reviewed and updated as needed this visit by Provider        Social History   Substance Use Topics     Smoking status: Former Smoker     Packs/day: 1.00     Types: Cigarettes     Start date: 1/1/1959     Quit date: 1/1/1974     Smokeless tobacco: Never Used     Alcohol use Yes      Comment: 2 glasses of wine per day        Alcohol Use 7/20/2018   If you drink alcohol do you typically have greater than 3 drinks per day OR greater than 7 drinks per week? No       Today's PHQ-2 Score:   PHQ-2 ( 1999 Pfizer) 7/20/2018   Q1: Little interest or pleasure in doing things 0   Q2: Feeling down, depressed or hopeless 0   PHQ-2 Score 0   Q1: Little interest or pleasure in doing  things Not at all   Q2: Feeling down, depressed or hopeless Not at all   PHQ-2 Score 0       Do you feel safe in your environment - Yes    Do you have a Health Care Directive?: Yes: Patient states has Advance Directive and will bring in a copy to clinic.    Current providers sharing in care for this patient include:   Patient Care Team:  Cabrera Bradshaw MD as PCP - General    The following health maintenance items are reviewed in Epic and correct as of today:  Health Maintenance   Topic Date Due     FALL RISK ASSESSMENT  07/19/2018     PHQ-2 Q1 YR  07/19/2018     INFLUENZA VACCINE (1) 09/01/2018     TETANUS IMMUNIZATION (SYSTEM ASSIGNED)  05/08/2019     BMP Q1 YR  07/13/2019     LIPID MONITORING Q1 YEAR  07/13/2019     COLONOSCOPY Q5 YR  08/31/2020     ADVANCE DIRECTIVE PLANNING Q5 YRS  07/19/2022     PNEUMOCOCCAL  Completed     Patient Active Problem List   Diagnosis     Colorectal polyps     Advanced directives, counseling/discussion     Hyperlipidemia LDL goal <100     History of non-ST elevation myocardial infarction (NSTEMI)     FHx: prostate cancer     Essential hypertension with goal blood pressure less than 140/90     Idiopathic chronic gout without tophus, unspecified site     Coronary artery disease involving native coronary artery of native heart without angina pectoris     Past Surgical History:   Procedure Laterality Date     CATARACT IOL, RT/LT  06/2009    right     CATARACT IOL, RT/LT  08/13    left     COLONOSCOPY       COLONOSCOPY N/A 8/31/2015    Procedure: COLONOSCOPY;  Surgeon: Francisco Horowitz MD;  Location: [facility] OR     COLONOSCOPY WITH CO2 INSUFFLATION N/A 8/31/2015    Procedure: COLONOSCOPY WITH CO2 INSUFFLATION;  Surgeon: Francisco Horowitz MD;  Location: [facility] OR     removal of rectal polyp  08/10     STENT, CORONARY, SAMANTA  10/13    3 coronary stents s/p NSTEMI     TONSILLECTOMY & ADENOIDECTOMY  1945     VASECTOMY  04/07/78       Social History   Substance Use Topics     Smoking  status: Former Smoker     Packs/day: 1.00     Types: Cigarettes     Start date: 1/1/1959     Quit date: 1/1/1974     Smokeless tobacco: Never Used     Alcohol use Yes      Comment: 2 glasses of wine per day      Family History   Problem Relation Age of Onset     Hypertension Mother      Breast Cancer Mother      Cancer - colorectal Father      Prostate Cancer Father      Cancer Brother      lymphoma     Hypertension Sister      Neurologic Disorder Maternal Grandmother      Cancer Paternal Grandfather          Current Outpatient Prescriptions   Medication Sig Dispense Refill     ASPIRIN 81 MG PO TABS 1 TABLET DAILY       atorvastatin (LIPITOR) 40 MG tablet Take 1 tablet (40 mg) by mouth daily 90 tablet 3     lisinopril-hydrochlorothiazide (PRINZIDE/ZESTORETIC) 20-25 MG per tablet Take 1 tablet by mouth daily 90 tablet 3     metoprolol succinate (TOPROL-XL) 50 MG 24 hr tablet Take 1 tablet (50 mg) by mouth daily 90 tablet 3     MULTI-VITAMIN PO TABS 1 TABLET DAILY       indomethacin (INDOCIN) 50 MG capsule Take 1 capsule by mouth. Up to 3 times per day as needed for gout (Patient not taking: Reported on 7/20/2018) 30 capsule 1     nitroglycerin (NITROSTAT) 0.4 MG SL tablet Place under the tongue every 5 minutes as needed       [DISCONTINUED] atorvastatin (LIPITOR) 40 MG tablet Take 1 tablet (40 mg) by mouth daily 90 tablet 3     [DISCONTINUED] lisinopril-hydrochlorothiazide (PRINZIDE/ZESTORETIC) 20-25 MG per tablet Take 1 tablet by mouth daily 90 tablet 3     [DISCONTINUED] metoprolol (TOPROL-XL) 50 MG 24 hr tablet Take 1 tablet (50 mg) by mouth daily 90 tablet 3     No Known Allergies    He remains on baseline medications as listed above.  He has generally been feeling well.  He is not having any angina.  He rarely has any gout issues.  He has indomethacin at home, but is only taken 1 capsule in the last year.  He is still physically active and still works as a .    Review of Systems  CONSTITUTIONAL:  "NEGATIVE for fever, chills, change in weight  INTEGUMENTARY/SKIN: NEGATIVE for worrisome rashes, moles or lesions  EYES: NEGATIVE for vision changes or irritation  ENT/MOUTH: NEGATIVE for ear, mouth and throat problems  RESP: NEGATIVE for significant cough or SOB  BREAST: NEGATIVE for masses, tenderness or discharge  CV: NEGATIVE for chest pain, palpitations or peripheral edema  GI: NEGATIVE for nausea, abdominal pain, heartburn, or change in bowel habits  : NEGATIVE for frequency, dysuria, or hematuria  MUSCULOSKELETAL: NEGATIVE for significant arthralgias or myalgia  NEURO: NEGATIVE for weakness, dizziness or paresthesias  ENDOCRINE: NEGATIVE for temperature intolerance, skin/hair changes  HEME: NEGATIVE for bleeding problems  PSYCHIATRIC: NEGATIVE for changes in mood or affect    OBJECTIVE:   /83 (BP Location: Right arm, Patient Position: Chair, Cuff Size: Adult Regular)  Pulse 58  Temp 97.9  F (36.6  C) (Oral)  Ht 5' 10\" (1.778 m)  Wt 186 lb (84.4 kg)  SpO2 97%  BMI 26.69 kg/m2 Estimated body mass index is 26.69 kg/(m^2) as calculated from the following:    Height as of this encounter: 5' 10\" (1.778 m).    Weight as of this encounter: 186 lb (84.4 kg).  Physical Exam  GENERAL: healthy, alert and no distress  EYES: Eyes grossly normal to inspection, PERRL and conjunctivae and sclerae normal  HENT: ear canals and TM's normal, nose and mouth without ulcers or lesions  NECK: no adenopathy, no asymmetry, masses, or scars and thyroid normal to palpation  RESP: lungs clear to auscultation - no rales, rhonchi or wheezes  CV: regular rate and rhythm, normal S1 S2, no S3 or S4, no murmur, click or rub, no peripheral edema and peripheral pulses strong  ABDOMEN: soft, nontender, no hepatosplenomegaly, no masses   MS: no gross musculoskeletal defects noted, no edema  SKIN: no suspicious lesions or rashes  NEURO: Normal strength and tone, mentation intact and speech normal  PSYCH: mentation appears normal, " affect normal/bright    Diagnostic Test Results:  Orders Only on 07/13/2018   Component Date Value Ref Range Status     Sodium 07/13/2018 141  133 - 144 mmol/L Final     Potassium 07/13/2018 3.6  3.4 - 5.3 mmol/L Final     Chloride 07/13/2018 104  94 - 109 mmol/L Final     Carbon Dioxide 07/13/2018 28  20 - 32 mmol/L Final     Anion Gap 07/13/2018 9  3 - 14 mmol/L Final     Glucose 07/13/2018 90  70 - 99 mg/dL Final    Fasting specimen     Urea Nitrogen 07/13/2018 21  7 - 30 mg/dL Final     Creatinine 07/13/2018 0.97  0.66 - 1.25 mg/dL Final     GFR Estimate 07/13/2018 75  >60 mL/min/1.7m2 Final    Non  GFR Calc     GFR Estimate If Black 07/13/2018 >90  >60 mL/min/1.7m2 Final    African American GFR Calc     Calcium 07/13/2018 9.2  8.5 - 10.1 mg/dL Final     Cholesterol 07/13/2018 117  <200 mg/dL Final     Triglycerides 07/13/2018 92  <150 mg/dL Final    Fasting specimen     HDL Cholesterol 07/13/2018 50  >39 mg/dL Final     LDL Cholesterol Calculated 07/13/2018 49  <100 mg/dL Final    Desirable:       <100 mg/dl     Non HDL Cholesterol 07/13/2018 67  <130 mg/dL Final     ALT 07/13/2018 38  0 - 70 U/L Final     Uric Acid 07/13/2018 8.2* 3.5 - 7.2 mg/dL Final     WBC 07/13/2018 7.6  4.0 - 11.0 10e9/L Final     RBC Count 07/13/2018 4.69  4.4 - 5.9 10e12/L Final     Hemoglobin 07/13/2018 15.2  13.3 - 17.7 g/dL Final     Hematocrit 07/13/2018 44.7  40.0 - 53.0 % Final     MCV 07/13/2018 95  78 - 100 fl Final     MCH 07/13/2018 32.4  26.5 - 33.0 pg Final     MCHC 07/13/2018 34.0  31.5 - 36.5 g/dL Final     RDW 07/13/2018 13.6  10.0 - 15.0 % Final     Platelet Count 07/13/2018 272  150 - 450 10e9/L Final         ASSESSMENT / PLAN:       ICD-10-CM    1. Routine general medical examination at a health care facility Z00.00    2. Hyperlipidemia LDL goal <100 E78.5 atorvastatin (LIPITOR) 40 MG tablet   3. Essential hypertension with goal blood pressure less than 140/90 I10 lisinopril-hydrochlorothiazide  "(PRINZIDE/ZESTORETIC) 20-25 MG per tablet     metoprolol succinate (TOPROL-XL) 50 MG 24 hr tablet   4. History of non-ST elevation myocardial infarction (NSTEMI) I25.2    5. Coronary artery disease involving native coronary artery of native heart without angina pectoris I25.10    6. 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  Practitioner is supportive of decision.    COUNSELING:  Reviewed preventive health counseling, as reflected in patient instructions       Regular exercise       Healthy diet/nutrition    BP Readings from Last 1 Encounters:   07/20/18 150/83     Estimated body mass index is 26.69 kg/(m^2) as calculated from the following:    Height as of this encounter: 5' 10\" (1.778 m).    Weight as of this encounter: 186 lb (84.4 kg).           reports that he quit smoking about 44 years ago. His smoking use included Cigarettes. He started smoking about 59 years ago. He smoked 1.00 pack per day. He has never used smokeless tobacco.      Appropriate preventive services were discussed with this patient, including applicable screening as appropriate for cardiovascular disease, diabetes, osteopenia/osteoporosis, and glaucoma.  As appropriate for age/gender, discussed screening for colorectal cancer, prostate cancer, breast cancer, and cervical cancer. Checklist " reviewing preventive services available has been given to the patient.    Reviewed patients plan of care and provided an AVS. The Basic Care Plan (routine screening as documented in Health Maintenance) for Samy meets the Care Plan requirement. This Care Plan has been established and reviewed with the Patient.    Counseling Resources:  ATP IV Guidelines  Pooled Cohorts Equation Calculator  Breast Cancer Risk Calculator  FRAX Risk Assessment  ICSI Preventive Guidelines  Dietary Guidelines for Americans, 2010  USDA's MyPlate  ASA Prophylaxis  Lung CA Screening    Cabrera Bradshaw MD  [facility]    Answers for HPI/ROS submitted by the patient on 7/20/2018   PHQ-2 Score: 0

## 2018-07-20 NOTE — MR AVS SNAPSHOT
After Visit Summary   7/20/2018    Samy Henry    MRN: [id number]           Patient Information     Date Of Birth          1940        Visit Information        Provider Department      7/20/2018 10:40 AM Cabrera Bradshaw MD [facility]        Today's Diagnoses     Routine general medical examination at a health care facility    -  1    Hyperlipidemia LDL goal <100        Essential hypertension with goal blood pressure less than 140/90        History of non-ST elevation myocardial infarction (NSTEMI)        Coronary artery disease involving native coronary artery of native heart without angina pectoris        Idiopathic chronic gout without tophus, unspecified site          Care Instructions      Preventive Health Recommendations:       Male Ages 65 and over    Yearly exam:             See your health care provider every year in order to  o   Review health changes.   o   Discuss preventive care.    o   Review your medicines if your doctor has prescribed any.    Talk with your health care provider about whether you should have a test to screen for prostate cancer (PSA).    Every 3 years, have a diabetes test (fasting glucose). If you are at risk for diabetes, you should have this test more often.    Every 5 years, have a cholesterol test. Have this test more often if you are at risk for high cholesterol or heart disease.     Every 10 years, have a colonoscopy. Or, have a yearly FIT test (stool test). These exams will check for colon cancer.    Talk to with your health care provider about screening for Abdominal Aortic Aneurysm if you have a family history of AAA or have a history of smoking.  Shots:     Get a flu shot each year.     Get a tetanus shot every 10 years.     Talk to your doctor about your pneumonia vaccines. There are now two you should receive - Pneumovax (PPSV 23) and Prevnar (PCV 13).    Talk to your pharmacist about a shingles vaccine.     Talk to your  doctor about the hepatitis B vaccine.  Nutrition:     Eat at least 5 servings of fruits and vegetables each day.     Eat whole-grain bread, whole-wheat pasta and brown rice instead of white grains and rice.     Get adequate Calcium and Vitamin D.   Lifestyle    Exercise for at least 150 minutes a week (30 minutes a day, 5 days a week). This will help you control your weight and prevent disease.     Limit alcohol to one drink per day.     No smoking.     Wear sunscreen to prevent skin cancer.     See your dentist every six months for an exam and cleaning.     See your eye doctor every 1 to 2 years to screen for conditions such as glaucoma, macular degeneration and cataracts.          Follow-ups after your visit        Follow-up notes from your care team     Return in about 1 year (around 7/20/2019).      Who to contact     If you have questions or need follow up information about today's clinic visit or your schedule please contact [facility] directly at [phone].  Normal or non-critical lab and imaging results will be communicated to you by [employer], letter or phone within 4 business days after the clinic has received the results. If you do not hear from us within 7 days, please contact the clinic through [employer] or phone. If you have a critical or abnormal lab result, we will notify you by phone as soon as possible.  Submit refill requests through [employer] or call your pharmacy and they will forward the refill request to us. Please allow 3 business days for your refill to be completed.          Additional Information About Your Visit        [employer] Information     [employer] gives you secure access to your electronic health record. If you see a primary care provider, you can also send messages to your care team and make appointments. If you have questions, please call your primary care clinic.  If you do not have a primary care provider, please call [phone] and they will assist you.       "  Care EveryWhere ID     This is your Care EveryWhere ID. This could be used by other organizations to access your [facility] medical records  [id number]        Your Vitals Were     Pulse Temperature Height Pulse Oximetry BMI (Body Mass Index)       58 97.9  F (36.6  C) (Oral) 5' 10\" (1.778 m) 97% 26.69 kg/m2        Blood Pressure from Last 3 Encounters:   07/20/18 150/83   10/02/17 143/73   07/19/17 137/82    Weight from Last 3 Encounters:   07/20/18 186 lb (84.4 kg)   10/02/17 188 lb (85.3 kg)   07/19/17 183 lb (83 kg)              Today, you had the following     No orders found for display         Where to get your medicines      These medications were sent to [facility] [address] [address]     Phone:  [phone]     atorvastatin 40 MG tablet    lisinopril-hydrochlorothiazide 20-25 MG per tablet    metoprolol succinate 50 MG 24 hr tablet          Primary Care Provider Office Phone # Fax #    Cabrera Bradshaw [phone]       [address] [facility] [address]        Equal Access to Services     IRAM POE : Hadii aad ku hadasho Soomaali, waaxda luqadaha, qaybta kaalmada adeegyada, waxay idiin haychicon lisha muñoz laluz dodson. So [facility] [phone].    ATENCIÓN: Si habla español, tiene a oneal disposición servicios gratuitos de asistencia lingüística. Llame al [phone].    We comply with applicable federal civil rights laws and [place] laws. We do not discriminate on the basis of race, color, national origin, age, disability, sex, sexual orientation, or gender identity.            Thank you!     Thank you for choosing [facility]  for your care. Our goal is always to provide you with excellent care. Hearing back from our patients is one way we can continue to improve our services. Please take a few minutes to complete the written survey that you may receive in the mail after your visit " with us. Thank you!             Your Updated Medication List - Protect others around you: Learn how to safely use, store and throw away your medicines at www.disposemymeds.org.          This list is accurate as of 7/20/18 11:19 AM.  Always use your most recent med list.                   Brand Name Dispense Instructions for use Diagnosis    aspirin 81 MG tablet      1 TABLET DAILY        atorvastatin 40 MG tablet    LIPITOR    90 tablet    Take 1 tablet (40 mg) by mouth daily    Hyperlipidemia LDL goal <100       indomethacin 50 MG capsule    INDOCIN    30 capsule    Take 1 capsule by mouth. Up to 3 times per day as needed for gout    Idiopathic chronic gout without tophus, unspecified site       lisinopril-hydrochlorothiazide 20-25 MG per tablet    PRINZIDE/ZESTORETIC    90 tablet    Take 1 tablet by mouth daily    Essential hypertension with goal blood pressure less than 140/90       metoprolol succinate 50 MG 24 hr tablet    TOPROL-XL    90 tablet    Take 1 tablet (50 mg) by mouth daily    Essential hypertension with goal blood pressure less than 140/90       Multi-vitamin Tabs tablet   Generic drug:  multivitamin, therapeutic with minerals      1 TABLET DAILY        nitroGLYcerin 0.4 MG sublingual tablet    NITROSTAT     Place under the tongue every 5 minutes as needed

## 2018-08-24 ENCOUNTER — TRANSFERRED RECORDS (OUTPATIENT)
Dept: HEALTH INFORMATION MANAGEMENT | Facility: CLINIC | Age: 78
End: 2018-08-24

## 2019-06-10 ENCOUNTER — TRANSFERRED RECORDS (OUTPATIENT)
Dept: HEALTH INFORMATION MANAGEMENT | Facility: CLINIC | Age: 79
End: 2019-06-10

## 2019-06-24 ENCOUNTER — DOCUMENTATION ONLY (OUTPATIENT)
Dept: FAMILY MEDICINE | Facility: CLINIC | Age: 79
End: 2019-06-24

## 2019-06-24 DIAGNOSIS — E78.5 HYPERLIPIDEMIA LDL GOAL <100: Primary | ICD-10-CM

## 2019-06-24 DIAGNOSIS — I10 ESSENTIAL HYPERTENSION WITH GOAL BLOOD PRESSURE LESS THAN 140/90: ICD-10-CM

## 2019-06-24 DIAGNOSIS — M1A.00X0 IDIOPATHIC CHRONIC GOUT WITHOUT TOPHUS, UNSPECIFIED SITE: ICD-10-CM

## 2019-07-16 DIAGNOSIS — E78.5 HYPERLIPIDEMIA LDL GOAL <100: ICD-10-CM

## 2019-07-16 DIAGNOSIS — I10 ESSENTIAL HYPERTENSION WITH GOAL BLOOD PRESSURE LESS THAN 140/90: ICD-10-CM

## 2019-07-16 DIAGNOSIS — M1A.00X0 IDIOPATHIC CHRONIC GOUT WITHOUT TOPHUS, UNSPECIFIED SITE: ICD-10-CM

## 2019-07-16 LAB
ALT SERPL W P-5'-P-CCNC: 40 U/L (ref 0–70)
ANION GAP SERPL CALCULATED.3IONS-SCNC: 6 MMOL/L (ref 3–14)
BUN SERPL-MCNC: 17 MG/DL (ref 7–30)
CALCIUM SERPL-MCNC: 8.8 MG/DL (ref 8.5–10.1)
CHLORIDE SERPL-SCNC: 107 MMOL/L (ref 94–109)
CHOLEST SERPL-MCNC: 120 MG/DL
CO2 SERPL-SCNC: 30 MMOL/L (ref 20–32)
CREAT SERPL-MCNC: 0.93 MG/DL (ref 0.66–1.25)
ERYTHROCYTE [DISTWIDTH] IN BLOOD BY AUTOMATED COUNT: 13.9 % (ref 10–15)
GFR SERPL CREATININE-BSD FRML MDRD: 78 ML/MIN/{1.73_M2}
GLUCOSE SERPL-MCNC: 86 MG/DL (ref 70–99)
HCT VFR BLD AUTO: 42.6 % (ref 40–53)
HDLC SERPL-MCNC: 48 MG/DL
HGB BLD-MCNC: 14.3 G/DL (ref 13.3–17.7)
LDLC SERPL CALC-MCNC: 54 MG/DL
MCH RBC QN AUTO: 32.6 PG (ref 26.5–33)
MCHC RBC AUTO-ENTMCNC: 33.6 G/DL (ref 31.5–36.5)
MCV RBC AUTO: 97 FL (ref 78–100)
NONHDLC SERPL-MCNC: 72 MG/DL
PLATELET # BLD AUTO: 257 10E9/L (ref 150–450)
POTASSIUM SERPL-SCNC: 3.7 MMOL/L (ref 3.4–5.3)
RBC # BLD AUTO: 4.38 10E12/L (ref 4.4–5.9)
SODIUM SERPL-SCNC: 143 MMOL/L (ref 133–144)
TRIGL SERPL-MCNC: 91 MG/DL
URATE SERPL-MCNC: 7.6 MG/DL (ref 3.5–7.2)
WBC # BLD AUTO: 7.2 10E9/L (ref 4–11)

## 2019-07-16 PROCEDURE — 80048 BASIC METABOLIC PNL TOTAL CA: CPT | Performed by: FAMILY MEDICINE

## 2019-07-16 PROCEDURE — 36415 COLL VENOUS BLD VENIPUNCTURE: CPT | Performed by: FAMILY MEDICINE

## 2019-07-16 PROCEDURE — 80061 LIPID PANEL: CPT | Performed by: FAMILY MEDICINE

## 2019-07-16 PROCEDURE — 85027 COMPLETE CBC AUTOMATED: CPT | Performed by: FAMILY MEDICINE

## 2019-07-16 PROCEDURE — 84460 ALANINE AMINO (ALT) (SGPT): CPT | Performed by: FAMILY MEDICINE

## 2019-07-16 PROCEDURE — 84550 ASSAY OF BLOOD/URIC ACID: CPT | Performed by: FAMILY MEDICINE

## 2019-07-23 ENCOUNTER — OFFICE VISIT (OUTPATIENT)
Dept: FAMILY MEDICINE | Facility: CLINIC | Age: 79
End: 2019-07-23
Payer: COMMERCIAL

## 2019-07-23 VITALS
HEART RATE: 65 BPM | TEMPERATURE: 97.6 F | DIASTOLIC BLOOD PRESSURE: 72 MMHG | BODY MASS INDEX: 26.48 KG/M2 | OXYGEN SATURATION: 96 % | SYSTOLIC BLOOD PRESSURE: 134 MMHG | WEIGHT: 185 LBS | HEIGHT: 70 IN

## 2019-07-23 DIAGNOSIS — Z23 NEED FOR PROPHYLACTIC VACCINATION WITH TETANUS-DIPHTHERIA (TD): ICD-10-CM

## 2019-07-23 DIAGNOSIS — Z00.00 ROUTINE GENERAL MEDICAL EXAMINATION AT A HEALTH CARE FACILITY: Primary | ICD-10-CM

## 2019-07-23 DIAGNOSIS — I25.10 CORONARY ARTERY DISEASE INVOLVING NATIVE CORONARY ARTERY OF NATIVE HEART WITHOUT ANGINA PECTORIS: ICD-10-CM

## 2019-07-23 DIAGNOSIS — I10 ESSENTIAL HYPERTENSION WITH GOAL BLOOD PRESSURE LESS THAN 140/90: ICD-10-CM

## 2019-07-23 DIAGNOSIS — E78.5 HYPERLIPIDEMIA LDL GOAL <100: ICD-10-CM

## 2019-07-23 PROCEDURE — 90471 IMMUNIZATION ADMIN: CPT | Performed by: FAMILY MEDICINE

## 2019-07-23 PROCEDURE — 99213 OFFICE O/P EST LOW 20 MIN: CPT | Mod: 25 | Performed by: FAMILY MEDICINE

## 2019-07-23 PROCEDURE — 99397 PER PM REEVAL EST PAT 65+ YR: CPT | Mod: 25 | Performed by: FAMILY MEDICINE

## 2019-07-23 PROCEDURE — 90714 TD VACC NO PRESV 7 YRS+ IM: CPT | Performed by: FAMILY MEDICINE

## 2019-07-23 RX ORDER — ATORVASTATIN CALCIUM 40 MG/1
40 TABLET, FILM COATED ORAL DAILY
Qty: 90 TABLET | Refills: 3 | Status: SHIPPED | OUTPATIENT
Start: 2019-07-23 | End: 2020-08-21

## 2019-07-23 RX ORDER — METOPROLOL SUCCINATE 50 MG/1
50 TABLET, EXTENDED RELEASE ORAL DAILY
Qty: 90 TABLET | Refills: 3 | Status: SHIPPED | OUTPATIENT
Start: 2019-07-23 | End: 2020-07-31

## 2019-07-23 RX ORDER — LISINOPRIL AND HYDROCHLOROTHIAZIDE 20; 25 MG/1; MG/1
1 TABLET ORAL DAILY
Qty: 90 TABLET | Refills: 3 | Status: SHIPPED | OUTPATIENT
Start: 2019-07-23 | End: 2020-08-21 | Stop reason: ALTCHOICE

## 2019-07-23 RX ORDER — NITROGLYCERIN 0.4 MG/1
0.4 TABLET SUBLINGUAL EVERY 5 MIN PRN
Qty: 25 TABLET | Refills: 1 | Status: SHIPPED | OUTPATIENT
Start: 2019-07-23 | End: 2020-08-21

## 2019-07-23 ASSESSMENT — ENCOUNTER SYMPTOMS
DYSURIA: 0
FEVER: 0
HEADACHES: 0
ARTHRALGIAS: 0
WEAKNESS: 0
MYALGIAS: 0
SORE THROAT: 0
HEMATOCHEZIA: 0
ABDOMINAL PAIN: 0
SHORTNESS OF BREATH: 0
JOINT SWELLING: 0
NERVOUS/ANXIOUS: 0
COUGH: 0
PARESTHESIAS: 0
EYE PAIN: 0
HEMATURIA: 0
DIZZINESS: 0
CONSTIPATION: 0
PALPITATIONS: 0
FREQUENCY: 0
DIARRHEA: 0
HEARTBURN: 0
NAUSEA: 0
CHILLS: 0

## 2019-07-23 ASSESSMENT — MIFFLIN-ST. JEOR: SCORE: 1565.4

## 2019-07-23 ASSESSMENT — ACTIVITIES OF DAILY LIVING (ADL): CURRENT_FUNCTION: NO ASSISTANCE NEEDED

## 2019-07-23 NOTE — NURSING NOTE
Screening Questionnaire for Adult Immunization    Are you sick today?   No   Do you have allergies to medications, food, a vaccine component or latex?   No   Have you ever had a serious reaction after receiving a vaccination?   No   Do you have a long-term health problem with heart disease, lung disease, asthma, kidney disease, metabolic disease (e.g. diabetes), anemia, or other blood disorder?   No   Do you have cancer, leukemia, HIV/AIDS, or any other immune system problem?   No   In the past 3 months, have you taken medications that affect  your immune system, such as prednisone, other steroids, or anticancer drugs; drugs for the treatment of rheumatoid arthritis, Crohn s disease, or psoriasis; or have you had radiation treatments?   No   Have you had a seizure, or a brain or other nervous system problem?   No   During the past year, have you received a transfusion of blood or blood     products, or been given immune (gamma) globulin or antiviral drug?   No   For women: Are you pregnant or is there a chance you could become        pregnant during the next month?   No   Have you received any vaccinations in the past 4 weeks?   No     Immunization questionnaire answers were all negative.        Per orders of Dr. Bradshaw, injection of Td given by Ivory Best. Patient instructed to remain in clinic for 15 minutes afterwards, and to report any adverse reaction to me immediately.  Vaccine information supplied.       Screening performed by Ivory Best on 7/23/2019 at 10:40 AM.

## 2019-07-23 NOTE — PROGRESS NOTES
"SUBJECTIVE:   Samy Henry is a 79 year old male who presents for a Preventive Visit and follow-up on baseline health conditions.  Are you in the first 12 months of your Medicare coverage?  No    Healthy Habits:     In general, how would you rate your overall health?  Excellent    Frequency of exercise:  2-3 days/week    Duration of exercise:  45-60 minutes    Do you usually eat at least 4 servings of fruit and vegetables a day, include whole grains    & fiber and avoid regularly eating high fat or \"junk\" foods?  No    Taking medications regularly:  Yes    Medication side effects:  None    Ability to successfully perform activities of daily living:  No assistance needed    Home Safety:  No safety concerns identified    Hearing Impairment:  No hearing concerns    In the past 6 months, have you been bothered by leaking of urine?  No    In general, how would you rate your overall mental or emotional health?  Excellent      PHQ-2 Total Score: 0    Additional concerns today:  No    Do you feel safe in your environment? Yes    Do you have a Health Care Directive? No: Advance care planning was reviewed with patient; patient declined at this time.      Fall risk  Fallen 2 or more times in the past year?: No  Any fall with injury in the past year?: No    Cognitive Screening   1) Repeat 3 items (Leader, Season, Table)    2) Clock draw: NORMAL  3) 3 item recall: Recalls 3 objects  Results: 3 items recalled: COGNITIVE IMPAIRMENT LESS LIKELY    Mini-CogTM Copyright S Jos. Licensed by the author for use in [facility]; reprinted with permission (brad@.[email or web address]). All rights reserved.      Do you have sleep apnea, excessive snoring or daytime drowsiness?: no    Reviewed and updated as needed this visit by clinical staff  Tobacco  Allergies  Meds  Med Hx  Surg Hx  Fam Hx  Soc Hx        Reviewed and updated as needed this visit by Provider        Social History     Tobacco Use     Smoking status: Former Smoker "     Packs/day: 1.00     Types: Cigarettes     Start date: 1959     Last attempt to quit: 1974     Years since quittin.5     Smokeless tobacco: Never Used   Substance Use Topics     Alcohol use: Yes     Comment: 2 glasses of wine per day          Alcohol Use 2019   Prescreen: >3 drinks/day or >7 drinks/week? No   Prescreen: >3 drinks/day or >7 drinks/week? -       He remains on cardiac meds including lisinopril HCTZ, metoprolol, and atorvastatin.  He has a history of CAD, but is not needing to use any nitroglycerin at all.  He sees cardiology once a year for that.  He does have a history of gout.  He did take 2 indomethacin tablets a month or so ago after experiencing some right foot pain, but his pain quickly subsided.  He has not been having any other significant gout episodes.  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  He has generally been feeling well.      Current providers sharing in care for this patient include:   Patient Care Team:  Cabrera Bradshaw MD as PCP - General  Cabrera Bradshaw MD as Assigned PCP    The following health maintenance items are reviewed in Epic and correct as of today:  Health Maintenance   Topic Date Due     ZOSTER IMMUNIZATION (2 of 3) 2009     FALL RISK ASSESSMENT  2018     PHQ-2  2019     DTAP/TDAP/TD IMMUNIZATION (3 - Td) 2019     MEDICARE ANNUAL WELLNESS VISIT  2019     INFLUENZA VACCINE (1) 2019     BMP  2020     LIPID  2020     COLONOSCOPY  2020     ADVANCE CARE PLANNING  2022     PNEUMOCOCCAL IMMUNIZATION 65+ LOW/MEDIUM RISK  Completed     IPV IMMUNIZATION  Aged Out     MENINGITIS IMMUNIZATION  Aged Out     Patient Active Problem List   Diagnosis     Colorectal polyps     Advanced directives, counseling/discussion     Hyperlipidemia LDL goal <100     History of non-ST elevation myocardial infarction (NSTEMI)     FHx: prostate cancer     Essential hypertension with goal blood pressure  less than 140/90     Idiopathic chronic gout without tophus, unspecified site     Coronary artery disease involving native coronary artery of native heart without angina pectoris     Past Surgical History:   Procedure Laterality Date     CATARACT IOL, RT/LT  2009    right     CATARACT IOL, RT/LT      left     COLONOSCOPY       COLONOSCOPY N/A 2015    Procedure: COLONOSCOPY;  Surgeon: Francisco Horowitz MD;  Location: [facility] OR     COLONOSCOPY WITH CO2 INSUFFLATION N/A 2015    Procedure: COLONOSCOPY WITH CO2 INSUFFLATION;  Surgeon: Francisco Horowitz MD;  Location: [facility] OR     removal of rectal polyp  08/10     STENT, CORONARY, SAMANTA  10/13    3 coronary stents s/p NSTEMI     TONSILLECTOMY & ADENOIDECTOMY       VASECTOMY  79       Social History     Tobacco Use     Smoking status: Former Smoker     Packs/day: 1.00     Types: Cigarettes     Start date: 1959     Last attempt to quit: 1974     Years since quittin.5     Smokeless tobacco: Never Used   Substance Use Topics     Alcohol use: Yes     Comment: 2 glasses of wine per day      Family History   Problem Relation Age of Onset     Hypertension Mother      Breast Cancer Mother      Cancer - colorectal Father      Prostate Cancer Father      Cancer Brother         lymphoma     Hypertension Sister      Neurologic Disorder Maternal Grandmother      Cancer Paternal Grandfather          Current Outpatient Medications   Medication Sig Dispense Refill     ASPIRIN 81 MG PO TABS 1 TABLET DAILY       atorvastatin (LIPITOR) 40 MG tablet Take 1 tablet (40 mg) by mouth daily 90 tablet 3     indomethacin (INDOCIN) 50 MG capsule Take 1 capsule by mouth. Up to 3 times per day as needed for gout 30 capsule 1     lisinopril-hydrochlorothiazide (PRINZIDE/ZESTORETIC) 20-25 MG per tablet Take 1 tablet by mouth daily 90 tablet 3     metoprolol succinate (TOPROL-XL) 50 MG 24 hr tablet Take 1 tablet (50 mg) by mouth daily 90 tablet 3      "MULTI-VITAMIN PO TABS 1 TABLET DAILY       nitroglycerin (NITROSTAT) 0.4 MG SL tablet Place under the tongue every 5 minutes as needed       No Known Allergies      Review of Systems   Constitutional: Negative for chills and fever.   HENT: Negative for congestion, ear pain, hearing loss and sore throat.    Eyes: Negative for pain and visual disturbance.   Respiratory: Negative for cough and shortness of breath.    Cardiovascular: Negative for chest pain, palpitations and peripheral edema.   Gastrointestinal: Negative for abdominal pain, constipation, diarrhea, heartburn, hematochezia and nausea.   Genitourinary: Positive for impotence. Negative for discharge, dysuria, frequency, genital sores, hematuria and urgency.   Musculoskeletal: Negative for arthralgias, joint swelling and myalgias.   Skin: Negative for rash.   Neurological: Negative for dizziness, weakness, headaches and paresthesias.   Psychiatric/Behavioral: Negative for mood changes. The patient is not nervous/anxious.      He did have some ultrasound [employer] screening test done recently which came back looking good.    OBJECTIVE:   /72 (BP Location: Right arm, Patient Position: Chair, Cuff Size: Adult Regular)   Pulse 65   Temp 97.6  F (36.4  C) (Oral)   Ht 1.778 m (5' 10\")   Wt 83.9 kg (185 lb)   SpO2 96%   BMI 26.54 kg/m   Estimated body mass index is 26.69 kg/m  as calculated from the following:    Height as of 7/20/18: 1.778 m (5' 10\").    Weight as of 7/20/18: 84.4 kg (186 lb).  Physical Exam  GENERAL: healthy, alert and no distress  EYES: Eyes grossly normal to inspection, PERRL and conjunctivae and sclerae normal  HENT: ear canals and TM's normal, nose and mouth without ulcers or lesions  NECK: no adenopathy, no asymmetry, masses, or scars and thyroid normal to palpation  RESP: lungs clear to auscultation - no rales, rhonchi or wheezes  CV: regular rate and rhythm, normal S1 S2, no S3 or S4, no murmur, click or rub, no peripheral edema " and peripheral pulses strong  ABDOMEN: soft, nontender, no hepatosplenomegaly, no masses   MS: no gross musculoskeletal defects noted, no edema  SKIN: no suspicious lesions or rashes  NEURO: Normal strength and tone, mentation intact and speech normal  PSYCH: mentation appears normal, affect normal/bright    Diagnostic Test Results:  Labs reviewed in Epic  Orders Only on 07/16/2019   Component Date Value Ref Range Status     Uric Acid 07/16/2019 7.6* 3.5 - 7.2 mg/dL Final     ALT 07/16/2019 40  0 - 70 U/L Final     WBC 07/16/2019 7.2  4.0 - 11.0 10e9/L Final     RBC Count 07/16/2019 4.38* 4.4 - 5.9 10e12/L Final     Hemoglobin 07/16/2019 14.3  13.3 - 17.7 g/dL Final     Hematocrit 07/16/2019 42.6  40.0 - 53.0 % Final     MCV 07/16/2019 97  78 - 100 fl Final     MCH 07/16/2019 32.6  26.5 - 33.0 pg Final     MCHC 07/16/2019 33.6  31.5 - 36.5 g/dL Final     RDW 07/16/2019 13.9  10.0 - 15.0 % Final     Platelet Count 07/16/2019 257  150 - 450 10e9/L Final     Cholesterol 07/16/2019 120  <200 mg/dL Final     Triglycerides 07/16/2019 91  <150 mg/dL Final    Fasting specimen     HDL Cholesterol 07/16/2019 48  >39 mg/dL Final     LDL Cholesterol Calculated 07/16/2019 54  <100 mg/dL Final    Desirable:       <100 mg/dl     Non HDL Cholesterol 07/16/2019 72  <130 mg/dL Final     Sodium 07/16/2019 143  133 - 144 mmol/L Final     Potassium 07/16/2019 3.7  3.4 - 5.3 mmol/L Final     Chloride 07/16/2019 107  94 - 109 mmol/L Final     Carbon Dioxide 07/16/2019 30  20 - 32 mmol/L Final     Anion Gap 07/16/2019 6  3 - 14 mmol/L Final     Glucose 07/16/2019 86  70 - 99 mg/dL Final    Fasting specimen     Urea Nitrogen 07/16/2019 17  7 - 30 mg/dL Final     Creatinine 07/16/2019 0.93  0.66 - 1.25 mg/dL Final     GFR Estimate 07/16/2019 78  >60 mL/min/[1.73_m2] Final    Comment: Non  GFR Calc  Starting 12/18/2018, serum creatinine based estimated GFR (eGFR) will be   calculated using the Chronic Kidney Disease  Epidemiology Collaboration   (CKD-EPI) equation.       GFR Estimate If Black 07/16/2019 >90  >60 mL/min/[1.73_m2] Final    Comment:  GFR Calc  Starting 12/18/2018, serum creatinine based estimated GFR (eGFR) will be   calculated using the Chronic Kidney Disease Epidemiology Collaboration   (CKD-EPI) equation.       Calcium 07/16/2019 8.8  8.5 - 10.1 mg/dL Final     His [employer] ultrasound results were also reviewed.  No concerning findings there.    ASSESSMENT / PLAN:       ICD-10-CM    1. Routine general medical examination at a health care facility Z00.00    2. Hyperlipidemia LDL goal <100 E78.5 atorvastatin (LIPITOR) 40 MG tablet   3. Essential hypertension with goal blood pressure less than 140/90 I10 lisinopril-hydrochlorothiazide (PRINZIDE/ZESTORETIC) 20-25 MG tablet     metoprolol succinate ER (TOPROL-XL) 50 MG 24 hr tablet   4. Coronary artery disease involving native coronary artery of native heart without angina pectoris I25.10 nitroGLYcerin (NITROSTAT) 0.4 MG sublingual tablet   5. Need for prophylactic vaccination with tetanus-diphtheria (Td) Z23 TD (ADULT, 7+) PRESERVE FREE          ADMIN VACCINE, FIRST     Blood pressure and other vital signs look fine  Lab results generally look good  We will continue his same baseline meds  We will give him a tetanus booster today  He will continue routine cardiology follow-up  He will be due for repeat colonoscopy next year and he is open to doing that, so we will plan on having him do one more colonoscopy next year and then be done with those  Plan a recheck in 1 year, or sooner prn    End of Life Planning:  Patient currently has an advanced directive: No.  I have verified the patient's ablity to prepare an advanced directive/make health care decisions.  Literature was provided to assist patient in preparing an advanced directive.    COUNSELING:  Reviewed preventive health counseling, as reflected in patient instructions       Regular exercise        "Healthy diet/nutrition       Immunizations    Vaccinated for: Td          Estimated body mass index is 26.69 kg/m  as calculated from the following:    Height as of 7/20/18: 1.778 m (5' 10\").    Weight as of 7/20/18: 84.4 kg (186 lb).         reports that he quit smoking about 45 years ago. His smoking use included cigarettes. He started smoking about 60 years ago. He smoked 1.00 pack per day. He has never used smokeless tobacco.      Appropriate preventive services were discussed with this patient, including applicable screening as appropriate for cardiovascular disease, diabetes, osteopenia/osteoporosis, and glaucoma.  As appropriate for age/gender, discussed screening for colorectal cancer, prostate cancer, breast cancer, and cervical cancer. Checklist reviewing preventive services available has been given to the patient.    Reviewed patients plan of care and provided an AVS. The Basic Care Plan (routine screening as documented in Health Maintenance) for Samy meets the Care Plan requirement. This Care Plan has been established and reviewed with the Patient.    Counseling Resources:  ATP IV Guidelines  Pooled Cohorts Equation Calculator  Breast Cancer Risk Calculator  FRAX Risk Assessment  ICSI Preventive Guidelines  Dietary Guidelines for Americans, 2010  USDA's MyPlate  ASA Prophylaxis  Lung CA Screening    Cabrera Bradshaw MD  [facility]    Identified Health Risks:  "

## 2020-01-03 ENCOUNTER — NURSE TRIAGE (OUTPATIENT)
Dept: FAMILY MEDICINE | Facility: CLINIC | Age: 80
End: 2020-01-03

## 2020-01-03 ENCOUNTER — OFFICE VISIT (OUTPATIENT)
Dept: FAMILY MEDICINE | Facility: CLINIC | Age: 80
End: 2020-01-03
Payer: COMMERCIAL

## 2020-01-03 ENCOUNTER — ANCILLARY PROCEDURE (OUTPATIENT)
Dept: GENERAL RADIOLOGY | Facility: CLINIC | Age: 80
End: 2020-01-03
Attending: FAMILY MEDICINE
Payer: COMMERCIAL

## 2020-01-03 VITALS
HEART RATE: 74 BPM | HEIGHT: 70 IN | RESPIRATION RATE: 18 BRPM | WEIGHT: 187.6 LBS | BODY MASS INDEX: 26.86 KG/M2 | TEMPERATURE: 96.7 F | OXYGEN SATURATION: 95 % | SYSTOLIC BLOOD PRESSURE: 144 MMHG | DIASTOLIC BLOOD PRESSURE: 76 MMHG

## 2020-01-03 DIAGNOSIS — J20.9 ACUTE BRONCHITIS, UNSPECIFIED ORGANISM: Primary | ICD-10-CM

## 2020-01-03 DIAGNOSIS — J20.9 ACUTE BRONCHITIS, UNSPECIFIED ORGANISM: ICD-10-CM

## 2020-01-03 PROCEDURE — 99214 OFFICE O/P EST MOD 30 MIN: CPT | Performed by: FAMILY MEDICINE

## 2020-01-03 PROCEDURE — 71046 X-RAY EXAM CHEST 2 VIEWS: CPT

## 2020-01-03 RX ORDER — AZELASTINE 1 MG/ML
1 SPRAY, METERED NASAL 2 TIMES DAILY
Qty: 1 BOTTLE | Refills: 0 | Status: SHIPPED | OUTPATIENT
Start: 2020-01-03 | End: 2020-11-17

## 2020-01-03 RX ORDER — FLUTICASONE PROPIONATE 50 MCG
1 SPRAY, SUSPENSION (ML) NASAL DAILY
Qty: 16 G | Refills: 1 | Status: SHIPPED | OUTPATIENT
Start: 2020-01-03 | End: 2020-11-17

## 2020-01-03 ASSESSMENT — MIFFLIN-ST. JEOR: SCORE: 1577.2

## 2020-01-03 NOTE — TELEPHONE ENCOUNTER
Urinary Tract Infection, Adult  Introduction  A urinary tract infection (UTI) is an infection of any part of the urinary tract. The urinary tract includes the:  · Kidneys.  · Ureters.  · Bladder.  · Urethra.  These organs make, store, and get rid of pee (urine) in the body.  Follow these instructions at home:  · Take over-the-counter and prescription medicines only as told by your doctor.  · If you were prescribed an antibiotic medicine, take it as told by your doctor. Do not stop taking the antibiotic even if you start to feel better.  · Avoid the following drinks:  ¨ Alcohol.  ¨ Caffeine.  ¨ Tea.  ¨ Carbonated drinks.  · Drink enough fluid to keep your pee clear or pale yellow.  · Keep all follow-up visits as told by your doctor. This is important.  · Make sure to:  ¨ Empty your bladder often and completely. Do not to hold pee for long periods of time.  ¨ Empty your bladder before and after sex.  ¨ Wipe from front to back after a bowel movement if you are female. Use each tissue one time when you wipe.  Contact a doctor if:  · You have back pain.  · You have a fever.  · You feel sick to your stomach (nauseous).  · You throw up (vomit).  · Your symptoms do not get better after 3 days.  · Your symptoms go away and then come back.  Get help right away if:  · You have very bad back pain.  · You have very bad lower belly (abdominal) pain.  · You are throwing up and cannot keep down any medicines or water.  This information is not intended to replace advice given to you by your health care provider. Make sure you discuss any questions you have with your health care provider.  Document Released: 06/05/2009 Document Revised: 05/25/2017 Document Reviewed: 11/07/2016  © 2017 [employer]     "Red flag call taken from .    Patient was last seen 7/23/19, 1 year follow up advised.    See triage below, office visit now advised   No openings at [facility] or with PCP, Dr. Bradshaw not available to address adding on at this time.    Scheduled for 11:15 am at [facility].    Patient verbalized understanding of and agreement with plan.    Darlyn Klein RN  [facility]        Additional Information    Negative: Bluish (or gray) lips or face    Negative: Severe difficulty breathing (e.g., struggling for each breath, speaks in single words)    Negative: Rapid onset of cough and has hives    Negative: Coughing started suddenly after medicine, an allergic food or bee sting    Negative: Difficulty breathing after exposure to flames, smoke, or fumes    Negative: Sounds like a life-threatening emergency to the triager    Negative: Previous asthma attacks and this feels like asthma attack    Negative: Chest pain present when not coughing    Negative: Difficulty breathing    Negative: Passed out (i.e., fainted, collapsed and was not responding)    Negative: Patient sounds very sick or weak to the triager    Wheezing is present    Answer Assessment - Initial Assessment Questions  1. ONSET: \"When did the cough begin?\"       About a week ago  2. SEVERITY: \"How bad is the cough today?\"       Able to sleep, coughing a few times an hour  3. RESPIRATORY DISTRESS: \"Describe your breathing.\"       Some wheezing, able to talk easily.   Cough and short of breath with exertion, started a week ago, about the same now  4. FEVER: \"Do you have a fever?\" If so, ask: \"What is your temperature, how was it measured, and when did it start?\"      No fever, 96.2 this am  5. HEMOPTYSIS: \"Are you coughing up any blood?\" If so ask: \"How much?\" (flecks, streaks, tablespoons, etc.)      No blood, sputum light yellow  6. TREATMENT: \"What have you done so far to treat the cough?\" (e.g., meds, fluids, humidifier)      " "Taking peace seltzer cold, pushing fluids  7. CARDIAC HISTORY: \"Do you have any history of heart disease?\" (e.g., heart attack, congestive heart failure)       yes  8. LUNG HISTORY: \"Do you have any history of lung disease?\"  (e.g., pulmonary embolus, asthma, emphysema)      No history asthma or lung issues  9. PE RISK FACTORS: \"Do you have a history of blood clots?\" (or: recent major surgery, recent prolonged travel, bedridden )      no  10. OTHER SYMPTOMS: \"Do you have any other symptoms? (e.g., runny nose, wheezing, chest pain)        Stuffy nose, wheezing, ribs hurt with cough, denies dizziness or blurry vision  11. PREGNANCY: \"Is there any chance you are pregnant?\" \"When was your last menstrual period?\"        n/a  12. TRAVEL: \"Have you traveled out of the country in the last month?\" (e.g., travel history, exposures)        no    Protocols used: COUGH-A-OH      "

## 2020-01-03 NOTE — PATIENT INSTRUCTIONS
Samy     I recommend taking probiotics while on antibiotics.  Some examples of probiotics are:    Culturelle  Digestive Advantage  Activia Yogurt    Let me know if you have questions.    Dennis Martin MD

## 2020-01-03 NOTE — PROGRESS NOTES
"Subjective     Samy Henry is a 79 year old male who presents to clinic today for the following health issues:    HPI   RESPIRATORY SYMPTOMS      Duration: over 1 week    Description  nasal congestion, rhinorrhea, cough and wheezing    Severity: moderate    Accompanying signs and symptoms: None    History (predisposing factors):  none    Precipitating or alleviating factors: None    Therapies tried and outcome:  rest and fluids oral decongestant    Samy presents with URI symptoms.  Patient has had a productive cough for 7 days, along with sore throat, nasal congestion, muscle aches, shortness of breath, sinus pressure.  Cough is not worse at night.  Patient denies, wheezing, ear pain/fullness, muscle aches, headache, fever.  Patient has been taking OTC cold medications and is trying to stay hydrated.   Medications - peace nobles cold tabs        BP Readings from Last 3 Encounters:   01/03/20 (!) 144/76   07/23/19 134/72   07/20/18 162/77    Wt Readings from Last 3 Encounters:   01/03/20 85.1 kg (187 lb 9.6 oz)   07/23/19 83.9 kg (185 lb)   07/20/18 84.4 kg (186 lb)                      Reviewed and updated as needed this visit by Provider  Tobacco  Allergies  Meds  Problems  Med Hx  Surg Hx  Fam Hx         Review of Systems   ROS COMP: Constitutional, HEENT, cardiovascular, pulmonary, gi and gu systems are negative, except as otherwise noted.      Objective    BP (!) 144/76   Pulse 74   Temp 96.7  F (35.9  C) (Oral)   Resp 18   Ht 1.778 m (5' 10\")   Wt 85.1 kg (187 lb 9.6 oz)   SpO2 95%   BMI 26.92 kg/m    Body mass index is 26.92 kg/m .  Physical Exam   GENERAL: healthy, alert and no distress  EYES: Eyes grossly normal to inspection, PERRL and conjunctivae and sclerae normal  HENT: ear canals and TM's normal, nose and mouth without ulcers or lesions  NECK: no adenopathy, no asymmetry, masses, or scars and thyroid normal to palpation  RESP: scattered rales, otherwise normal air movement  CV: " regular rate and rhythm, normal S1 S2, no S3 or S4, no murmur, click or rub, no peripheral edema and peripheral pulses strong  SKIN: no suspicious lesions or rashes  PSYCH: mentation appears normal, affect normal/bright          Assessment & Plan       ICD-10-CM    1. Acute bronchitis, unspecified organism J20.9 XR Chest 2 Views     amoxicillin-clavulanate (AUGMENTIN) 875-125 MG tablet     Ipratropium-Albuterol (COMBIVENT RESPIMAT)  MCG/ACT inhaler     fluticasone (FLONASE) 50 MCG/ACT nasal spray     azelastine (ASTELIN) 0.1 % nasal spray     Will give antibiotic given rales on exam and shortness of breath.  XR to r/o pneumonia  Probiotics while taking antibiotics  combivent inhaler as needed  Nasal sprays for sinus congestion and post-nasal drip           Return in about 1 month (around 2/3/2020) for If symptoms persist.    Dennis Martin MD  [facility]

## 2020-02-24 ENCOUNTER — HEALTH MAINTENANCE LETTER (OUTPATIENT)
Age: 80
End: 2020-02-24

## 2020-07-29 DIAGNOSIS — I10 ESSENTIAL HYPERTENSION WITH GOAL BLOOD PRESSURE LESS THAN 140/90: ICD-10-CM

## 2020-07-31 RX ORDER — METOPROLOL SUCCINATE 50 MG/1
TABLET, EXTENDED RELEASE ORAL
Qty: 90 TABLET | Refills: 0 | Status: SHIPPED | OUTPATIENT
Start: 2020-07-31 | End: 2020-08-21

## 2020-07-31 NOTE — TELEPHONE ENCOUNTER
Routing refill request to provider for review/approval because:  BP Readings from Last 3 Encounters:   01/03/20 (!) 144/76   07/23/19 134/72   07/20/18 162/77

## 2020-08-07 ENCOUNTER — DOCUMENTATION ONLY (OUTPATIENT)
Dept: FAMILY MEDICINE | Facility: CLINIC | Age: 80
End: 2020-08-07

## 2020-08-07 DIAGNOSIS — E78.5 HYPERLIPIDEMIA LDL GOAL <100: ICD-10-CM

## 2020-08-07 DIAGNOSIS — I10 ESSENTIAL HYPERTENSION WITH GOAL BLOOD PRESSURE LESS THAN 140/90: Primary | ICD-10-CM

## 2020-08-07 DIAGNOSIS — M1A.00X0 IDIOPATHIC CHRONIC GOUT WITHOUT TOPHUS, UNSPECIFIED SITE: ICD-10-CM

## 2020-08-10 DIAGNOSIS — E78.5 HYPERLIPIDEMIA LDL GOAL <100: ICD-10-CM

## 2020-08-10 DIAGNOSIS — I10 ESSENTIAL HYPERTENSION WITH GOAL BLOOD PRESSURE LESS THAN 140/90: ICD-10-CM

## 2020-08-10 DIAGNOSIS — M1A.00X0 IDIOPATHIC CHRONIC GOUT WITHOUT TOPHUS, UNSPECIFIED SITE: ICD-10-CM

## 2020-08-10 LAB
ALT SERPL W P-5'-P-CCNC: 39 U/L (ref 0–70)
ANION GAP SERPL CALCULATED.3IONS-SCNC: 7 MMOL/L (ref 3–14)
BUN SERPL-MCNC: 18 MG/DL (ref 7–30)
CALCIUM SERPL-MCNC: 9 MG/DL (ref 8.5–10.1)
CHLORIDE SERPL-SCNC: 106 MMOL/L (ref 94–109)
CHOLEST SERPL-MCNC: 104 MG/DL
CO2 SERPL-SCNC: 29 MMOL/L (ref 20–32)
CREAT SERPL-MCNC: 0.98 MG/DL (ref 0.66–1.25)
ERYTHROCYTE [DISTWIDTH] IN BLOOD BY AUTOMATED COUNT: 14.2 % (ref 10–15)
GFR SERPL CREATININE-BSD FRML MDRD: 72 ML/MIN/{1.73_M2}
GLUCOSE SERPL-MCNC: 91 MG/DL (ref 70–99)
HCT VFR BLD AUTO: 45.5 % (ref 40–53)
HDLC SERPL-MCNC: 46 MG/DL
HGB BLD-MCNC: 15.5 G/DL (ref 13.3–17.7)
LDLC SERPL CALC-MCNC: 45 MG/DL
MCH RBC QN AUTO: 33 PG (ref 26.5–33)
MCHC RBC AUTO-ENTMCNC: 34.1 G/DL (ref 31.5–36.5)
MCV RBC AUTO: 97 FL (ref 78–100)
NONHDLC SERPL-MCNC: 58 MG/DL
PLATELET # BLD AUTO: 250 10E9/L (ref 150–450)
POTASSIUM SERPL-SCNC: 3.7 MMOL/L (ref 3.4–5.3)
RBC # BLD AUTO: 4.7 10E12/L (ref 4.4–5.9)
SODIUM SERPL-SCNC: 142 MMOL/L (ref 133–144)
TRIGL SERPL-MCNC: 67 MG/DL
URATE SERPL-MCNC: 9.3 MG/DL (ref 3.5–7.2)
WBC # BLD AUTO: 6.3 10E9/L (ref 4–11)

## 2020-08-10 PROCEDURE — 84550 ASSAY OF BLOOD/URIC ACID: CPT | Performed by: FAMILY MEDICINE

## 2020-08-10 PROCEDURE — 36415 COLL VENOUS BLD VENIPUNCTURE: CPT | Performed by: FAMILY MEDICINE

## 2020-08-10 PROCEDURE — 80061 LIPID PANEL: CPT | Performed by: FAMILY MEDICINE

## 2020-08-10 PROCEDURE — 85027 COMPLETE CBC AUTOMATED: CPT | Performed by: FAMILY MEDICINE

## 2020-08-10 PROCEDURE — 80048 BASIC METABOLIC PNL TOTAL CA: CPT | Performed by: FAMILY MEDICINE

## 2020-08-10 PROCEDURE — 84460 ALANINE AMINO (ALT) (SGPT): CPT | Performed by: FAMILY MEDICINE

## 2020-08-21 ENCOUNTER — OFFICE VISIT (OUTPATIENT)
Dept: FAMILY MEDICINE | Facility: CLINIC | Age: 80
End: 2020-08-21
Payer: COMMERCIAL

## 2020-08-21 VITALS
TEMPERATURE: 98.5 F | SYSTOLIC BLOOD PRESSURE: 144 MMHG | HEIGHT: 70 IN | DIASTOLIC BLOOD PRESSURE: 76 MMHG | OXYGEN SATURATION: 98 % | WEIGHT: 185 LBS | BODY MASS INDEX: 26.48 KG/M2 | HEART RATE: 66 BPM

## 2020-08-21 DIAGNOSIS — E79.0 HYPERURICEMIA: ICD-10-CM

## 2020-08-21 DIAGNOSIS — K62.1 COLORECTAL POLYPS: ICD-10-CM

## 2020-08-21 DIAGNOSIS — E78.5 HYPERLIPIDEMIA LDL GOAL <100: ICD-10-CM

## 2020-08-21 DIAGNOSIS — Z00.00 ENCOUNTER FOR MEDICARE ANNUAL WELLNESS EXAM: Primary | ICD-10-CM

## 2020-08-21 DIAGNOSIS — I25.10 CORONARY ARTERY DISEASE INVOLVING NATIVE CORONARY ARTERY OF NATIVE HEART WITHOUT ANGINA PECTORIS: ICD-10-CM

## 2020-08-21 DIAGNOSIS — I10 ESSENTIAL HYPERTENSION WITH GOAL BLOOD PRESSURE LESS THAN 140/90: ICD-10-CM

## 2020-08-21 DIAGNOSIS — I25.2 HISTORY OF NON-ST ELEVATION MYOCARDIAL INFARCTION (NSTEMI): ICD-10-CM

## 2020-08-21 DIAGNOSIS — K63.5 COLORECTAL POLYPS: ICD-10-CM

## 2020-08-21 DIAGNOSIS — M1A.00X0 IDIOPATHIC CHRONIC GOUT WITHOUT TOPHUS, UNSPECIFIED SITE: ICD-10-CM

## 2020-08-21 PROCEDURE — 99397 PER PM REEVAL EST PAT 65+ YR: CPT | Performed by: FAMILY MEDICINE

## 2020-08-21 PROCEDURE — 99213 OFFICE O/P EST LOW 20 MIN: CPT | Mod: 25 | Performed by: FAMILY MEDICINE

## 2020-08-21 RX ORDER — AMLODIPINE AND BENAZEPRIL HYDROCHLORIDE 5; 40 MG/1; MG/1
1 CAPSULE ORAL DAILY
Qty: 90 CAPSULE | Refills: 3 | Status: SHIPPED | OUTPATIENT
Start: 2020-08-21 | End: 2020-09-18 | Stop reason: DRUGHIGH

## 2020-08-21 RX ORDER — NITROGLYCERIN 0.4 MG/1
0.4 TABLET SUBLINGUAL EVERY 5 MIN PRN
Qty: 25 TABLET | Refills: 1 | Status: SHIPPED | OUTPATIENT
Start: 2020-08-21

## 2020-08-21 RX ORDER — METOPROLOL SUCCINATE 50 MG/1
50 TABLET, EXTENDED RELEASE ORAL DAILY
Qty: 90 TABLET | Refills: 3 | Status: SHIPPED | OUTPATIENT
Start: 2020-08-21 | End: 2020-10-30 | Stop reason: DRUGHIGH

## 2020-08-21 RX ORDER — ATORVASTATIN CALCIUM 40 MG/1
40 TABLET, FILM COATED ORAL DAILY
Qty: 90 TABLET | Refills: 3 | Status: SHIPPED | OUTPATIENT
Start: 2020-08-21 | End: 2021-08-23

## 2020-08-21 ASSESSMENT — MIFFLIN-ST. JEOR: SCORE: 1560.4

## 2020-08-21 ASSESSMENT — PAIN SCALES - GENERAL: PAINLEVEL: NO PAIN (0)

## 2020-08-21 NOTE — PROGRESS NOTES
"  SUBJECTIVE:   Samy Henry is a 80 year old male who presents for a Preventive Visit and follow-up on baseline health conditions.    Are you in the first 12 months of your Medicare Part B coverage?  No    Physical Health:    In general, how would you rate your overall physical health? good    Outside of work, how many days during the week do you exercise? 2-3 days/week    Outside of work, approximately how many minutes a day do you exercise?greater than 60 minutes  If you drink alcohol do you typically have >3 drinks per day or >7 drinks per week? Yes - AUDIT SCORE:     No flowsheet data found.    Do you usually eat at least 4 servings of fruit and vegetables a day, include whole grains & fiber and avoid regularly eating high fat or \"junk\" foods? Yes    Do you have any problems taking medications regularly?  No    Do you have any side effects from medications? occassional gout from the HCTZ    Needs assistance for the following daily activities: no assistance needed    Which of the following safety concerns are present in your home?  none identified     Hearing impairment: No    In the past 6 months, have you been bothered by leaking of urine? no    Mental Health:    In general, how would you rate your overall mental or emotional health? excellent  PHQ-2 Score: 0    Do you feel safe in your environment? Yes    Have you ever done Advance Care Planning? (For example, a Health Directive, POLST, or a discussion with a medical provider or your loved ones about your wishes): Yes, advance care planning is on file.    Additional concerns to address?  No    Fall risk:  Fallen 2 or more times in the past year?: No  Any fall with injury in the past year?: No    Cognitive Screenin) Repeat 3 items (Leader, Season, Table)    2) Clock draw: NORMAL  3) 3 item recall: Recalls 3 objects  Results: NORMAL clock, 1-2 items recalled: COGNITIVE IMPAIRMENT LESS LIKELY    Mini-CogTM Copyright S Jos. Licensed by the author for " use in [facility]; reprinted with permission ([email or web address]). All rights reserved.      Do you have sleep apnea, excessive snoring or daytime drowsiness?: no    He is still flying airplanes and was just doing so yesterday.  He remains on baseline medication as below.  He will be having a follow-up visit with cardiology in the near future.  He is not having any unusual chest pain or palpitations or other cardiac symptoms.  He does have a history of gout, but has not had any gout attacks in recent months.  He has not been needing the indomethacin at all.  He is due for a 5-year follow-up colonoscopy and is willing to do that exam given his history of colon polyps.    Reviewed and updated as needed this visit by clinical staff  Tobacco         Reviewed and updated as needed this visit by Provider        Social History     Tobacco Use     Smoking status: Former Smoker     Packs/day: 1.00     Types: Cigarettes     Start date: 1959     Last attempt to quit: 1974     Years since quittin.6     Smokeless tobacco: Never Used   Substance Use Topics     Alcohol use: Yes     Comment: 2 glasses of wine per day                            Current providers sharing in care for this patient include:   Patient Care Team:  Cabrera Bradshaw MD as PCP - General  [facility] beds at the [facility], Dennis Fraire MD as Assigned PCP    The following health maintenance items are reviewed in Epic and correct as of today:  Health Maintenance   Topic Date Due     ZOSTER IMMUNIZATION (2 of 3) 2009     FALL RISK ASSESSMENT  2020     MEDICARE ANNUAL WELLNESS VISIT  2020     COLORECTAL CANCER SCREENING  2020     INFLUENZA VACCINE (1) 2020     BMP  08/10/2021     LIPID  08/10/2021     ADVANCE CARE PLANNING  2024     DTAP/TDAP/TD IMMUNIZATION (4 - Td) 2029     PHQ-2  Completed     PNEUMOCOCCAL IMMUNIZATION 65+ LOW/MEDIUM RISK  Completed     IPV IMMUNIZATION  Aged Out     MENINGITIS IMMUNIZATION   Aged Out     HEPATITIS B IMMUNIZATION  Aged Out     Patient Active Problem List   Diagnosis     Colorectal polyps     Advanced directives, counseling/discussion     Hyperlipidemia LDL goal <100     History of non-ST elevation myocardial infarction (NSTEMI)     FHx: prostate cancer     Essential hypertension with goal blood pressure less than 140/90     Idiopathic chronic gout without tophus, unspecified site     Coronary artery disease involving native coronary artery of native heart without angina pectoris     Past Surgical History:   Procedure Laterality Date     CATARACT IOL, RT/LT  2009    right     CATARACT IOL, RT/LT      left     COLONOSCOPY       COLONOSCOPY N/A 2015    Procedure: COLONOSCOPY;  Surgeon: Francisco Horowitz MD;  Location: [facility] OR     COLONOSCOPY WITH CO2 INSUFFLATION N/A 2015    Procedure: COLONOSCOPY WITH CO2 INSUFFLATION;  Surgeon: Francisco Horowitz MD;  Location: [facility] OR     removal of rectal polyp  08/10     STENT, CORONARY, SAMANTA  10/13    3 coronary stents s/p NSTEMI     TONSILLECTOMY & ADENOIDECTOMY       VASECTOMY  79       Social History     Tobacco Use     Smoking status: Former Smoker     Packs/day: 1.00     Types: Cigarettes     Start date: 1959     Last attempt to quit: 1974     Years since quittin.6     Smokeless tobacco: Never Used   Substance Use Topics     Alcohol use: Yes     Comment: 2 glasses of wine per day      Family History   Problem Relation Age of Onset     Hypertension Mother      Breast Cancer Mother      Cancer - colorectal Father      Prostate Cancer Father      Cancer Brother         lymphoma     Hypertension Sister      Neurologic Disorder Maternal Grandmother      Cancer Paternal Grandfather          Current Outpatient Medications   Medication Sig Dispense Refill     ASPIRIN 81 MG PO TABS 1 TABLET DAILY       atorvastatin (LIPITOR) 40 MG tablet Take 1 tablet (40 mg) by mouth daily 90 tablet 3     azelastine  "(ASTELIN) 0.1 % nasal spray Spray 1 spray into both nostrils 2 times daily 1 Bottle 0     fluticasone (FLONASE) 50 MCG/ACT nasal spray Spray 1 spray into both nostrils daily 16 g 1     indomethacin (INDOCIN) 50 MG capsule Take 1 capsule by mouth. Up to 3 times per day as needed for gout 30 capsule 1     Ipratropium-Albuterol (COMBIVENT RESPIMAT)  MCG/ACT inhaler Inhale 1 puff into the lungs 4 times daily as needed for shortness of breath / dyspnea or wheezing 1 Inhaler 0     lisinopril-hydrochlorothiazide (PRINZIDE/ZESTORETIC) 20-25 MG tablet Take 1 tablet by mouth daily 90 tablet 3     metoprolol succinate ER (TOPROL-XL) 50 MG 24 hr tablet TAKE ONE TABLET BY MOUTH ONE TIME DAILY  90 tablet 0     MULTI-VITAMIN PO TABS 1 TABLET DAILY       nitroGLYcerin (NITROSTAT) 0.4 MG sublingual tablet Place 1 tablet (0.4 mg) under the tongue every 5 minutes as needed 25 tablet 1     No Known Allergies      ROS:  Constitutional, HEENT, cardiovascular, pulmonary, GI, , musculoskeletal, neuro, skin, endocrine and psych systems are negative, except as otherwise noted.    OBJECTIVE:   BP (!) 172/81 (BP Location: Right arm, Patient Position: Chair, Cuff Size: Adult Regular)   Pulse 72   Temp 98.5  F (36.9  C) (Oral)   Ht 1.778 m (5' 10\")   Wt 83.9 kg (185 lb)   SpO2 98%   BMI 26.54 kg/m   Estimated body mass index is 26.54 kg/m  as calculated from the following:    Height as of this encounter: 1.778 m (5' 10\").    Weight as of this encounter: 83.9 kg (185 lb).  EXAM:   GENERAL: healthy, alert and no distress  EYES: Eyes grossly normal to inspection, PERRL and conjunctivae and sclerae normal  HENT: ear canals and TM's normal, nose and mouth without ulcers or lesions  NECK: no adenopathy, no asymmetry, masses, or scars and thyroid normal to palpation  RESP: lungs clear to auscultation - no rales, rhonchi or wheezes  CV: regular rate and rhythm, normal S1 S2, no S3 or S4, no murmur, click or rub, no peripheral edema and " peripheral pulses strong  ABDOMEN: soft, nontender, no hepatosplenomegaly, no masses and bowel sounds normal  MS: no gross musculoskeletal defects noted, no edema  SKIN: no suspicious lesions or rashes  NEURO: Normal strength and tone, mentation intact and speech normal  PSYCH: mentation appears normal, affect normal/bright    Diagnostic Test Results:  Labs reviewed in Epic  Orders Only on 08/10/2020   Component Date Value Ref Range Status     ALT 08/10/2020 39  0 - 70 U/L Final     Uric Acid 08/10/2020 9.3* 3.5 - 7.2 mg/dL Final     WBC 08/10/2020 6.3  4.0 - 11.0 10e9/L Final     RBC Count 08/10/2020 4.70  4.4 - 5.9 10e12/L Final     Hemoglobin 08/10/2020 15.5  13.3 - 17.7 g/dL Final     Hematocrit 08/10/2020 45.5  40.0 - 53.0 % Final     MCV 08/10/2020 97  78 - 100 fl Final     MCH 08/10/2020 33.0  26.5 - 33.0 pg Final     MCHC 08/10/2020 34.1  31.5 - 36.5 g/dL Final     RDW 08/10/2020 14.2  10.0 - 15.0 % Final     Platelet Count 08/10/2020 250  150 - 450 10e9/L Final     Cholesterol 08/10/2020 104  <200 mg/dL Final     Triglycerides 08/10/2020 67  <150 mg/dL Final    Fasting specimen     HDL Cholesterol 08/10/2020 46  >39 mg/dL Final     LDL Cholesterol Calculated 08/10/2020 45  <100 mg/dL Final    Desirable:       <100 mg/dl     Non HDL Cholesterol 08/10/2020 58  <130 mg/dL Final     Sodium 08/10/2020 142  133 - 144 mmol/L Final     Potassium 08/10/2020 3.7  3.4 - 5.3 mmol/L Final     Chloride 08/10/2020 106  94 - 109 mmol/L Final     Carbon Dioxide 08/10/2020 29  20 - 32 mmol/L Final     Anion Gap 08/10/2020 7  3 - 14 mmol/L Final     Glucose 08/10/2020 91  70 - 99 mg/dL Final    Fasting specimen     Urea Nitrogen 08/10/2020 18  7 - 30 mg/dL Final     Creatinine 08/10/2020 0.98  0.66 - 1.25 mg/dL Final     GFR Estimate 08/10/2020 72  >60 mL/min/[1.73_m2] Final    Comment: Non  GFR Calc  Starting 12/18/2018, serum creatinine based estimated GFR (eGFR) will be   calculated using the Chronic Kidney  Disease Epidemiology Collaboration   (CKD-EPI) equation.       GFR Estimate If Black 08/10/2020 84  >60 mL/min/[1.73_m2] Final    Comment:  GFR Calc  Starting 12/18/2018, serum creatinine based estimated GFR (eGFR) will be   calculated using the Chronic Kidney Disease Epidemiology Collaboration   (CKD-EPI) equation.       Calcium 08/10/2020 9.0  8.5 - 10.1 mg/dL Final         ASSESSMENT / PLAN:       ICD-10-CM    1. Encounter for Medicare annual wellness exam  Z00.00    2. Hyperlipidemia LDL goal <100  E78.5 atorvastatin (LIPITOR) 40 MG tablet   3. Essential hypertension with goal blood pressure less than 140/90  I10 amLODIPine-benazepril (LOTREL) 5-40 MG capsule     metoprolol succinate ER (TOPROL-XL) 50 MG 24 hr tablet   4. Coronary artery disease involving native coronary artery of native heart without angina pectoris  I25.10 nitroGLYcerin (NITROSTAT) 0.4 MG sublingual tablet   5. History of non-ST elevation myocardial infarction (NSTEMI)  I25.2    6. Colorectal polyps  K63.5 GASTROENTEROLOGY ADULT REF PROCEDURE ONLY   7. Idiopathic chronic gout without tophus, unspecified site  M1A.00X0    8. 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.0      His blood pressure was quite high when he first came in, but then was down some by the end of the visit  His laboratory tests look great except for the elevated uric acid level  Given his hyperuricemia and history of gout, as well as mildly elevated blood pressure readings, I suggested we discontinue the HCTZ and add amlodipine to the ACE inhibitor in its place  We will therefore discontinue his lisinopril HCTZ and change him to amlodipine benazepril  Continue other same medications  I will refer him for another colonoscopy  He will follow-up with cardiology sometime in the near future as per their request  I advised a blood pressure recheck with me in about 6 weeks  We will likely want to check a repeat BMP and uric acid level then as well    COUNSELING:  Reviewed preventive health  "counseling, as reflected in patient instructions       Regular exercise       Healthy diet/nutrition    Estimated body mass index is 26.54 kg/m  as calculated from the following:    Height as of this encounter: 1.778 m (5' 10\").    Weight as of this encounter: 83.9 kg (185 lb).         reports that he quit smoking about 46 years ago. His smoking use included cigarettes. He started smoking about 61 years ago. He smoked 1.00 pack per day. He has never used smokeless tobacco.      Appropriate preventive services were discussed with this patient, including applicable screening as appropriate for cardiovascular disease, diabetes, osteopenia/osteoporosis, and glaucoma.  As appropriate for age/gender, discussed screening for colorectal cancer, prostate cancer, breast cancer, and cervical cancer. Checklist reviewing preventive services available has been given to the patient.    Reviewed patients plan of care and provided an AVS. The Basic Care Plan (routine screening as documented in Health Maintenance) for Samy meets the Care Plan requirement. This Care Plan has been established and reviewed with the Patient.    Counseling Resources:  ATP IV Guidelines  Pooled Cohorts Equation Calculator  Breast Cancer Risk Calculator  FRAX Risk Assessment  ICSI Preventive Guidelines  Dietary Guidelines for Americans, 2010  USDA's MyPlate  ASA Prophylaxis  Lung CA Screening    Cabrera Bradshaw MD  [facility]  "

## 2020-08-21 NOTE — PATIENT INSTRUCTIONS
Patient Education   Personalized Prevention Plan  You are due for the preventive services outlined below.  Your care team is available to assist you in scheduling these services.  If you have already completed any of these items, please share that information with your care team to update in your medical record.  Health Maintenance Due   Topic Date Due     Zoster (Shingles) Vaccine (2 of 3) 07/03/2009     FALL RISK ASSESSMENT  07/23/2020     Annual Wellness Visit  07/23/2020     Colorectal Cancer Screening  08/31/2020

## 2020-09-09 DIAGNOSIS — Z11.59 ENCOUNTER FOR SCREENING FOR OTHER VIRAL DISEASES: Primary | ICD-10-CM

## 2020-09-15 ENCOUNTER — TRANSFERRED RECORDS (OUTPATIENT)
Dept: HEALTH INFORMATION MANAGEMENT | Facility: CLINIC | Age: 80
End: 2020-09-15

## 2020-09-18 ENCOUNTER — OFFICE VISIT (OUTPATIENT)
Dept: FAMILY MEDICINE | Facility: CLINIC | Age: 80
End: 2020-09-18
Payer: COMMERCIAL

## 2020-09-18 VITALS
BODY MASS INDEX: 27.12 KG/M2 | DIASTOLIC BLOOD PRESSURE: 73 MMHG | HEART RATE: 61 BPM | WEIGHT: 189 LBS | SYSTOLIC BLOOD PRESSURE: 153 MMHG | OXYGEN SATURATION: 98 %

## 2020-09-18 DIAGNOSIS — E79.0 HYPERURICEMIA: ICD-10-CM

## 2020-09-18 DIAGNOSIS — I10 ESSENTIAL HYPERTENSION WITH GOAL BLOOD PRESSURE LESS THAN 140/90: Primary | ICD-10-CM

## 2020-09-18 DIAGNOSIS — Z87.39 HISTORY OF GOUT: ICD-10-CM

## 2020-09-18 DIAGNOSIS — I25.10 CORONARY ARTERY DISEASE INVOLVING NATIVE CORONARY ARTERY OF NATIVE HEART WITHOUT ANGINA PECTORIS: ICD-10-CM

## 2020-09-18 PROCEDURE — 99213 OFFICE O/P EST LOW 20 MIN: CPT | Performed by: FAMILY MEDICINE

## 2020-09-18 RX ORDER — AMLODIPINE AND BENAZEPRIL HYDROCHLORIDE 10; 40 MG/1; MG/1
1 CAPSULE ORAL DAILY
COMMUNITY
Start: 2020-09-15 | End: 2020-10-30 | Stop reason: DRUGHIGH

## 2020-09-18 NOTE — PROGRESS NOTES
Subjective     Samy Henry is a 80 year old male who presents to clinic today for the following health issues:    HPI       Hypertension Follow-up      Do you check your blood pressure regularly outside of the clinic? Yes     Are you following a low salt diet? Yes    Are your blood pressures ever more than 140 on the top number (systolic) OR more   than 90 on the bottom number (diastolic), for example 140/90? Yes      How many servings of fruits and vegetables do you eat daily?  2-3    On average, how many sweetened beverages do you drink each day (Examples: soda, juice, sweet tea, etc.  Do NOT count diet or artificially sweetened beverages)?   1    How many days per week do you exercise enough to make your heart beat faster? 3 or less    How many minutes a day do you exercise enough to make your heart beat faster? 60 or more    How many days per week do you miss taking your medication? 0    Patient Active Problem List   Diagnosis     Colorectal polyps     Advanced directives, counseling/discussion     Hyperlipidemia LDL goal <100     History of non-ST elevation myocardial infarction (NSTEMI)     FHx: prostate cancer     Essential hypertension with goal blood pressure less than 140/90     Idiopathic chronic gout without tophus, unspecified site     Coronary artery disease involving native coronary artery of native heart without angina pectoris     Current Outpatient Medications   Medication     amLODIPine-benazepril (LOTREL) 5-40 MG capsule     ASPIRIN 81 MG PO TABS     atorvastatin (LIPITOR) 40 MG tablet     metoprolol succinate ER (TOPROL-XL) 50 MG 24 hr tablet     MULTI-VITAMIN PO TABS     nitroGLYcerin (NITROSTAT) 0.4 MG sublingual tablet     amLODIPine-benazepril (LOTREL) 10-40 MG capsule     azelastine (ASTELIN) 0.1 % nasal spray     fluticasone (FLONASE) 50 MCG/ACT nasal spray     indomethacin (INDOCIN) 50 MG capsule     Ipratropium-Albuterol (COMBIVENT RESPIMAT)  MCG/ACT inhaler     No current  facility-administered medications for this visit.      We had changed his blood pressure medication around last month from lisinopril HCTZ to amlodipine-benazepril.  Home blood pressure readings are still running in the 140s to 150 systolic.  He saw cardiology 3 days ago and they increased his amlodipine benazepril dose from 5-40 mg daily to 10-40 mg daily.  He has not yet gotten a new prescription for the higher dose yet from the pharmacy.  He has not been having any apparent side effects from the amlodipine benazepril such as ankle swelling or cough.    Review of Systems   Constitutional, HEENT, cardiovascular, pulmonary, gi and gu systems are negative, except as otherwise noted.      Objective    BP (!) 153/73 (BP Location: Left arm, Patient Position: Sitting, Cuff Size: Adult Regular)   Pulse 61   Wt 85.7 kg (189 lb)   SpO2 98%   BMI 27.12 kg/m    Body mass index is 27.12 kg/m .  Physical Exam   GENERAL: healthy, alert and no distress    Previous lab results were reviewed, including his elevated uric acid level of 9.3        Assessment & Plan       ICD-10-CM    1. Essential hypertension with goal blood pressure less than 140/90  I10    2. 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 History of gout  Z87.39    4. Coronary artery disease involving native coronary artery of native heart without angina pectoris  I25.10      His blood pressure is not adequately controlled on the amlodipine benazepril 5-40 mg dose  He will increase the dose to 10-40 mg daily and continue to check home blood pressure readings and then return to see me in a month for a follow-up  We will plan to check a uric acid level and BMP at that time  Hopefully his uric acid level will come down some now that he is off the HCTZ       Return in about 1 month (around 10/18/2020) for BP Recheck, Lab Work.    Cabrera Bradshaw MD  [facility]

## 2020-09-21 RX ORDER — BISACODYL 5 MG
5 TABLET, DELAYED RELEASE (ENTERIC COATED) ORAL SEE ADMIN INSTRUCTIONS
Qty: 1 TABLET | Refills: 0 | Status: SHIPPED | OUTPATIENT
Start: 2020-09-21 | End: 2020-11-17

## 2020-09-21 RX ORDER — SODIUM, POTASSIUM,MAG SULFATES 17.5-3.13G
1 SOLUTION, RECONSTITUTED, ORAL ORAL SEE ADMIN INSTRUCTIONS
Qty: 2 BOTTLE | Refills: 0 | Status: SHIPPED | OUTPATIENT
Start: 2020-09-21 | End: 2020-11-17

## 2020-09-25 DIAGNOSIS — Z11.59 ENCOUNTER FOR SCREENING FOR OTHER VIRAL DISEASES: ICD-10-CM

## 2020-09-25 LAB
SARS-COV-2 RNA SPEC QL NAA+PROBE: NOT DETECTED
SPECIMEN SOURCE: NORMAL

## 2020-09-25 PROCEDURE — U0003 INFECTIOUS AGENT DETECTION BY NUCLEIC ACID (DNA OR RNA); SEVERE ACUTE RESPIRATORY SYNDROME CORONAVIRUS 2 (SARS-COV-2) (CORONAVIRUS DISEASE [COVID-19]), AMPLIFIED PROBE TECHNIQUE, MAKING USE OF HIGH THROUGHPUT TECHNOLOGIES AS DESCRIBED BY CMS-2020-01-R: HCPCS | Performed by: SURGERY

## 2020-09-29 ENCOUNTER — HOSPITAL ENCOUNTER (OUTPATIENT)
Facility: AMBULATORY SURGERY CENTER | Age: 80
Discharge: HOME | End: 2020-09-29
Attending: SURGERY | Admitting: SURGERY
Payer: COMMERCIAL

## 2020-09-29 VITALS
RESPIRATION RATE: 16 BRPM | TEMPERATURE: 97.4 F | OXYGEN SATURATION: 96 % | HEART RATE: 78 BPM | SYSTOLIC BLOOD PRESSURE: 138 MMHG | DIASTOLIC BLOOD PRESSURE: 72 MMHG

## 2020-09-29 DIAGNOSIS — Z12.11 SCREEN FOR COLON CANCER: Primary | ICD-10-CM

## 2020-09-29 LAB — COLONOSCOPY: NORMAL

## 2020-09-29 PROCEDURE — G0121 COLON CA SCRN NOT HI RSK IND: HCPCS

## 2020-09-29 PROCEDURE — G8907 PT DOC NO EVENTS ON DISCHARG: HCPCS

## 2020-09-29 PROCEDURE — G0105 COLORECTAL SCRN; HI RISK IND: HCPCS | Performed by: SURGERY

## 2020-09-29 PROCEDURE — G8918 PT W/O PREOP ORDER IV AB PRO: HCPCS

## 2020-09-29 RX ORDER — LIDOCAINE 40 MG/G
CREAM TOPICAL
Status: DISCONTINUED | OUTPATIENT
Start: 2020-09-29 | End: 2020-09-30 | Stop reason: HOSPADM

## 2020-09-29 RX ORDER — FENTANYL CITRATE 50 UG/ML
INJECTION, SOLUTION INTRAMUSCULAR; INTRAVENOUS PRN
Status: DISCONTINUED | OUTPATIENT
Start: 2020-09-29 | End: 2020-09-29 | Stop reason: HOSPADM

## 2020-09-29 RX ORDER — ONDANSETRON 2 MG/ML
4 INJECTION INTRAMUSCULAR; INTRAVENOUS
Status: DISCONTINUED | OUTPATIENT
Start: 2020-09-29 | End: 2020-09-30 | Stop reason: HOSPADM

## 2020-10-07 ENCOUNTER — ANCILLARY PROCEDURE (OUTPATIENT)
Dept: GENERAL RADIOLOGY | Facility: CLINIC | Age: 80
End: 2020-10-07
Attending: FAMILY MEDICINE
Payer: COMMERCIAL

## 2020-10-07 ENCOUNTER — OFFICE VISIT (OUTPATIENT)
Dept: FAMILY MEDICINE | Facility: CLINIC | Age: 80
End: 2020-10-07
Payer: COMMERCIAL

## 2020-10-07 VITALS
HEART RATE: 82 BPM | TEMPERATURE: 98.5 F | WEIGHT: 189 LBS | OXYGEN SATURATION: 100 % | DIASTOLIC BLOOD PRESSURE: 70 MMHG | SYSTOLIC BLOOD PRESSURE: 127 MMHG | BODY MASS INDEX: 27.12 KG/M2

## 2020-10-07 DIAGNOSIS — M25.562 PAIN IN BOTH KNEES, UNSPECIFIED CHRONICITY: ICD-10-CM

## 2020-10-07 DIAGNOSIS — I10 ESSENTIAL HYPERTENSION WITH GOAL BLOOD PRESSURE LESS THAN 140/90: Primary | ICD-10-CM

## 2020-10-07 DIAGNOSIS — M25.561 PAIN IN BOTH KNEES, UNSPECIFIED CHRONICITY: ICD-10-CM

## 2020-10-07 DIAGNOSIS — R60.0 LEG EDEMA: ICD-10-CM

## 2020-10-07 DIAGNOSIS — Z23 NEED FOR PROPHYLACTIC VACCINATION AND INOCULATION AGAINST INFLUENZA: ICD-10-CM

## 2020-10-07 DIAGNOSIS — M1A.00X0 IDIOPATHIC CHRONIC GOUT WITHOUT TOPHUS, UNSPECIFIED SITE: ICD-10-CM

## 2020-10-07 LAB
ANION GAP SERPL CALCULATED.3IONS-SCNC: 6 MMOL/L (ref 3–14)
BUN SERPL-MCNC: 13 MG/DL (ref 7–30)
CALCIUM SERPL-MCNC: 9.2 MG/DL (ref 8.5–10.1)
CHLORIDE SERPL-SCNC: 108 MMOL/L (ref 94–109)
CO2 SERPL-SCNC: 28 MMOL/L (ref 20–32)
CREAT SERPL-MCNC: 0.83 MG/DL (ref 0.66–1.25)
GFR SERPL CREATININE-BSD FRML MDRD: 83 ML/MIN/{1.73_M2}
GLUCOSE SERPL-MCNC: 89 MG/DL (ref 70–99)
POTASSIUM SERPL-SCNC: 3.8 MMOL/L (ref 3.4–5.3)
SODIUM SERPL-SCNC: 142 MMOL/L (ref 133–144)
URATE SERPL-MCNC: 5 MG/DL (ref 3.5–7.2)

## 2020-10-07 PROCEDURE — 80048 BASIC METABOLIC PNL TOTAL CA: CPT | Performed by: FAMILY MEDICINE

## 2020-10-07 PROCEDURE — 90662 IIV NO PRSV INCREASED AG IM: CPT | Performed by: FAMILY MEDICINE

## 2020-10-07 PROCEDURE — 73562 X-RAY EXAM OF KNEE 3: CPT | Mod: RT | Performed by: FAMILY MEDICINE

## 2020-10-07 PROCEDURE — 99214 OFFICE O/P EST MOD 30 MIN: CPT | Mod: 25 | Performed by: FAMILY MEDICINE

## 2020-10-07 PROCEDURE — 84550 ASSAY OF BLOOD/URIC ACID: CPT | Performed by: FAMILY MEDICINE

## 2020-10-07 PROCEDURE — G0008 ADMIN INFLUENZA VIRUS VAC: HCPCS | Performed by: FAMILY MEDICINE

## 2020-10-07 ASSESSMENT — PAIN SCALES - GENERAL: PAINLEVEL: SEVERE PAIN (7)

## 2020-10-07 NOTE — PROGRESS NOTES
Subjective     Samy Henry is a 80 year old male who presents to clinic today for the following health issues:    HPI         Hypertension Follow-up      Do you check your blood pressure regularly outside of the clinic? Yes once a week    Are you following a low salt diet? Yes    Are your blood pressures ever more than 140 on the top number (systolic) OR more   than 90 on the bottom number (diastolic), for example 140/90? Yes more than 140 on the top      How many servings of fruits and vegetables do you eat daily?  4 or more    On average, how many sweetened beverages do you drink each day (Examples: soda, juice, sweet tea, etc.  Do NOT count diet or artificially sweetened beverages)?   1    How many days per week do you exercise enough to make your heart beat faster? 3 or less    How many minutes a day do you exercise enough to make your heart beat faster? 20 - 29    How many days per week do you miss taking your medication? 0    Musculoskeletal problem/pain  Onset/Duration: 1 week  Description  Location: knee - left  Joint Swelling: YES  Redness: YES  Pain: YES- When using it. 6-7/10 for pain  Warmth: YES  Intensity:  6-7/10  Progression of Symptoms:  same  Accompanying signs and symptoms:   Fevers: no  Numbness/tingling/weakness: YES- weakness  History  Trauma to the area: YES- Fell on his knee  Recent illness:  no  Previous similar problem: no  Previous evaluation:  no  Precipitating or alleviating factors:  Aggravating factors include: standing, walking, climbing stairs, lifting and exercise  Therapies tried and outcome: Advil    He is now on a maximal dose of amlodipine benazepril at 10-40 mg daily.  He has been starting to notice some swelling of his feet and ankles.  Home blood pressure readings are still somewhat variable.  Some are at goal and some are slightly above goal.  He did fall a week ago after stumbling on a step that he landed on his left knee.  He has discomfort overlying the left knee to  palpation and also when he walks.  It does not bother him at rest.  He has had some bilateral knee discomfort off and on for a number of months.  His right knee makes noises when he squats down.  He thinks there may be some arthritis in his knees.    Patient Active Problem List   Diagnosis     Colorectal polyps     Advanced directives, counseling/discussion     Hyperlipidemia LDL goal <100     History of non-ST elevation myocardial infarction (NSTEMI)     FHx: prostate cancer     Essential hypertension with goal blood pressure less than 140/90     Idiopathic chronic gout without tophus, unspecified site     Coronary artery disease involving native coronary artery of native heart without angina pectoris     Current Outpatient Medications   Medication     amLODIPine-benazepril (LOTREL) 10-40 MG capsule     ASPIRIN 81 MG PO TABS     atorvastatin (LIPITOR) 40 MG tablet     indomethacin (INDOCIN) 50 MG capsule     metoprolol succinate ER (TOPROL-XL) 50 MG 24 hr tablet     MULTI-VITAMIN PO TABS     nitroGLYcerin (NITROSTAT) 0.4 MG sublingual tablet     azelastine (ASTELIN) 0.1 % nasal spray     bisacodyl (DULCOLAX) 5 MG EC tablet     fluticasone (FLONASE) 50 MCG/ACT nasal spray     Ipratropium-Albuterol (COMBIVENT RESPIMAT)  MCG/ACT inhaler     Na Sulfate-K Sulfate-Mg Sulf (SUPREP BOWEL PREP) solution     polyethylene glycol (GOLYTELY) 236 g suspension     No current facility-administered medications for this visit.          Review of Systems   Constitutional, HEENT, cardiovascular, pulmonary, gi and gu systems are negative, except as otherwise noted.      Objective    /70 (BP Location: Left arm, Patient Position: Sitting, Cuff Size: Adult Regular)   Pulse 82   Temp 98.5  F (36.9  C) (Oral)   Wt 85.7 kg (189 lb)   SpO2 100%   BMI 27.12 kg/m    Body mass index is 27.12 kg/m .  Physical Exam   GENERAL: healthy, alert and no distress  EXT: He does have 1+ swelling of the left lower leg trace swelling of the  right lower leg.  He has some discomfort to palpation over the anterior left knee overlying the patella.  No tenderness elsewhere to palpation around the left knee.          Assessment & Plan       ICD-10-CM    1. Essential hypertension with goal blood pressure less than 140/90  I10 Basic metabolic panel   2. Leg edema  R60.0    3. Pain in both knees, unspecified chronicity  M25.561 XR Knee Bilateral 3 Views    M25.562    4. Idiopathic chronic gout without tophus, unspecified site  M1A.00X0 Uric acid   5. Need for prophylactic vaccination and inoculation against influenza  Z23 FLUZONE HIGH DOSE 65+  [[id number]]     ADMIN INFLUENZA (For MEDICARE Patients ONLY) []   His blood pressure appears under better control now with the higher dose of amlodipine benazepril, but he is getting some edema, likely from the higher dose of amlodipine  He thinks he can live with that for now, so we will continue his same medications and follow that  If the edema is too bothersome to him, then we may want to increase his metoprolol dose or perhaps add back in the hydrochlorothiazide (which we are trying to avoid because of his history of gout)  We will check a nonfasting BMP and uric acid level today  We will check some knee x-rays to rule out left patella fracture and evaluate for underlying osteoarthritis  We will give him a flu shot today  We will have him follow-up in the upcoming weeks in some fashion (either in person or virtually) if his edema and/or knee pain do not improve          Return for a recheck if symptoms worsen or fail to improve.    Cabrera Bradshaw MD  [facility]

## 2020-10-30 ENCOUNTER — MYC MEDICAL ADVICE (OUTPATIENT)
Dept: FAMILY MEDICINE | Facility: CLINIC | Age: 80
End: 2020-10-30

## 2020-10-30 DIAGNOSIS — I25.10 CORONARY ARTERY DISEASE INVOLVING NATIVE CORONARY ARTERY OF NATIVE HEART WITHOUT ANGINA PECTORIS: ICD-10-CM

## 2020-10-30 DIAGNOSIS — I10 ESSENTIAL HYPERTENSION WITH GOAL BLOOD PRESSURE LESS THAN 140/90: Primary | ICD-10-CM

## 2020-10-30 RX ORDER — AMLODIPINE AND BENAZEPRIL HYDROCHLORIDE 5; 40 MG/1; MG/1
1 CAPSULE ORAL DAILY
Qty: 90 CAPSULE | Refills: 3 | Status: SHIPPED | OUTPATIENT
Start: 2020-10-30 | End: 2021-01-12 | Stop reason: ALTCHOICE

## 2020-10-30 RX ORDER — METOPROLOL SUCCINATE 100 MG/1
100 TABLET, EXTENDED RELEASE ORAL DAILY
Qty: 90 TABLET | Refills: 3 | Status: SHIPPED | OUTPATIENT
Start: 2020-10-30 | End: 2021-01-12 | Stop reason: ALTCHOICE

## 2020-10-30 NOTE — TELEPHONE ENCOUNTER
Patient was seen 10/7/20 by Dr. Bradshaw.   hydrochlorothiazide was stopped 8/21/20 (was on lisinopril hydrochlorothiazide).    Appears the amlodipine in the lotrel was recently increased.    See visit note 10/7:    His blood pressure appears under better control now with the higher dose of amlodipine benazepril, but he is getting some edema, likely from the higher dose of amlodipine  He thinks he can live with that for now, so we will continue his same medications and follow that  If the edema is too bothersome to him, then we may want to increase his metoprolol dose or perhaps add back in the hydrochlorothiazide (which we are trying to avoid because of his history of gout)      Routed patient's mychart update to Dr. Bradshaw, he is bothered by the edema.    Darlyn Klein RN  [facility]

## 2020-11-17 ENCOUNTER — MYC MEDICAL ADVICE (OUTPATIENT)
Dept: FAMILY MEDICINE | Facility: CLINIC | Age: 80
End: 2020-11-17

## 2020-11-17 DIAGNOSIS — I10 ESSENTIAL HYPERTENSION WITH GOAL BLOOD PRESSURE LESS THAN 140/90: Primary | ICD-10-CM

## 2020-11-17 RX ORDER — CLONIDINE HYDROCHLORIDE 0.2 MG/1
0.2 TABLET ORAL 2 TIMES DAILY
Qty: 60 TABLET | Refills: 1 | Status: SHIPPED | OUTPATIENT
Start: 2020-11-17 | End: 2021-01-12 | Stop reason: ALTCHOICE

## 2020-11-17 NOTE — TELEPHONE ENCOUNTER
Routed to provider, ongoing mychart conversation.   Patient had reported long list of BP's earlier.       Darlyn Klein RN  [facility]

## 2020-11-17 NOTE — TELEPHONE ENCOUNTER
Routed extensive list of daily BP readings and patient comments to provider to advise.    Darlyn Klein RN  [facility]

## 2020-11-18 ENCOUNTER — TRANSFERRED RECORDS (OUTPATIENT)
Dept: HEALTH INFORMATION MANAGEMENT | Facility: CLINIC | Age: 80
End: 2020-11-18

## 2020-11-18 LAB
CREAT SERPL-MCNC: 0.88 MG/DL (ref 0.72–1.25)
GFR SERPL CREATININE-BSD FRML MDRD: >60 ML/MIN/1.73M2
GLUCOSE SERPL-MCNC: 93 MG/DL (ref 65–100)
POTASSIUM SERPL-SCNC: 4.3 MMOL/L (ref 3.5–5)

## 2020-11-24 ENCOUNTER — MYC MEDICAL ADVICE (OUTPATIENT)
Dept: FAMILY MEDICINE | Facility: CLINIC | Age: 80
End: 2020-11-24

## 2020-11-24 NOTE — TELEPHONE ENCOUNTER
Routed patient's BP update to Dr. Bradshaw to advise.    Darlyn Klein RN  [facility]

## 2021-01-04 NOTE — RESULT ENCOUNTER NOTE
"Dominic,  Good news on the x-rays.  Nothing is broken with the left knee.  You do have some mild changes of arthritis in both knees, but nothing major or especially advanced.  I suspect your anterior left knee pain represents a \"bone bruise\" (contusion) and will gradually heal on its own in the next week or 2.  Hopefully your knees will continue to support you for years to come!  I will let you know your lab results when they become available.    Cabrera Bradshaw MD  "
Dominic,  Your electrolytes and kidney tests remain nice and normal.  Your uric acid level is significantly lower than previously and is now down into the normal range.  This likely reflects your discontinuation of the hydrochlorothiazide, so I think it would be nice to keep you off that medicine in the future if possible.  Please continue your same blood pressure medications for now.  Hopefully the lower leg swelling will become less of an issue, but keep me posted if you have further concerns or questions.    Cabrera Bradshaw MD  
Detail Level: Simple
Detail Level: Detailed

## 2021-01-10 ENCOUNTER — MYC MEDICAL ADVICE (OUTPATIENT)
Dept: FAMILY MEDICINE | Facility: CLINIC | Age: 81
End: 2021-01-10

## 2021-01-11 NOTE — TELEPHONE ENCOUNTER
Routed to TC pool to help patient schedule.  Appears BP is the main concern, med review.    Darlyn Klein RN  [facility]

## 2021-01-11 NOTE — TELEPHONE ENCOUNTER
"Please assist the patient in scheduling a telephone visit with me for sometime this week in one of the \"virtual\" visit slots.  "

## 2021-01-12 ENCOUNTER — TRANSFERRED RECORDS (OUTPATIENT)
Dept: HEALTH INFORMATION MANAGEMENT | Facility: CLINIC | Age: 81
End: 2021-01-12

## 2021-01-12 ENCOUNTER — VIRTUAL VISIT (OUTPATIENT)
Dept: FAMILY MEDICINE | Facility: CLINIC | Age: 81
End: 2021-01-12
Payer: COMMERCIAL

## 2021-01-12 DIAGNOSIS — I10 ESSENTIAL HYPERTENSION WITH GOAL BLOOD PRESSURE LESS THAN 140/90: Primary | ICD-10-CM

## 2021-01-12 PROCEDURE — 99441 PR PHYSICIAN TELEPHONE EVALUATION 5-10 MIN: CPT | Mod: 95 | Performed by: FAMILY MEDICINE

## 2021-01-12 RX ORDER — CARVEDILOL 12.5 MG/1
12.5 TABLET ORAL 2 TIMES DAILY WITH MEALS
Qty: 60 TABLET | Refills: 5 | Status: SHIPPED | OUTPATIENT
Start: 2021-01-12 | End: 2021-02-02 | Stop reason: DRUGHIGH

## 2021-01-12 RX ORDER — LISINOPRIL 40 MG/1
40 TABLET ORAL DAILY
Qty: 30 TABLET | Refills: 5 | Status: SHIPPED | OUTPATIENT
Start: 2021-01-12 | End: 2021-04-29

## 2021-01-12 NOTE — PROGRESS NOTES
Dominic is a 80 year old who is being evaluated via a billable telephone visit.       What phone number would you like to be contacted at? [phone]  How would you like to obtain your AVS? MyChart  Assessment & Plan       ICD-10-CM    1. Essential hypertension with goal blood pressure less than 140/90  I10 lisinopril (ZESTRIL) 40 MG tablet     carvedilol (COREG) 12.5 MG tablet     We discussed various alternative options for treatment of his hypertension  We will have him discontinue his current 3 medicines of metoprolol, amlodipine benazepril, and clonidine  In their place, we will substitute lisinopril 40 mg daily and carvedilol 12.5 mg twice a day  Continue to monitor home blood pressure and heart rate readings over the next month  Plan a repeat telephone visit in about 4 weeks        Return in about 4 weeks (around 2/9/2021) for BP Recheck via a telephone visit .    Cabrera Bradshaw MD  [facility]     Dominic is a 80 year old who presents to clinic today for the following health issues     HPI       Would like to discuss side affects from his medications. See [employer] message.    He feels like he may be getting some shortness of breath from the metoprolol, edema from the amlodipine, and dizziness and sleepiness from the clonidine.  He wonders if there are other options we could try.    He did see cardiology in November and December and had a cardiac stress test done in early December which showed no ischemia.      Patient Active Problem List   Diagnosis     Colorectal polyps     Advanced directives, counseling/discussion     Hyperlipidemia LDL goal <100     History of non-ST elevation myocardial infarction (NSTEMI)     FHx: prostate cancer     Essential hypertension with goal blood pressure less than 140/90     Idiopathic chronic gout without tophus, unspecified site     Coronary artery disease involving native coronary artery of native heart without angina pectoris     Current  "Outpatient Medications   Medication     amLODIPine-benazepril (LOTREL) 5-40 MG capsule     ASPIRIN 81 MG PO TABS     atorvastatin (LIPITOR) 40 MG tablet     cloNIDine (CATAPRES) 0.2 MG tablet     metoprolol succinate ER (TOPROL-XL) 100 MG 24 hr tablet     MULTI-VITAMIN PO TABS     nitroGLYcerin (NITROSTAT) 0.4 MG sublingual tablet     indomethacin (INDOCIN) 50 MG capsule     No current facility-administered medications for this visit.          Review of Systems   Constitutional, HEENT, cardiovascular, pulmonary, gi and gu systems are negative, except as otherwise noted.      Objective           Vitals:  No vitals were obtained today due to virtual visit.    Physical Exam     This visit is being conducted \"virtually\" via telephone rather than \"in person\" because of the current nationwide COVID-19 pandemic and the desire to limit potential exposures to illness for patients.    PSYCH: Alert and oriented times 3; coherent speech, normal   rate and volume, able to articulate logical thoughts, able   to abstract reason, no tangential thoughts, no hallucinations   or delusions  His affect is normal  RESP: No cough, no audible wheezing, able to talk in full sentences  Remainder of exam unable to be completed due to telephone visits    Previous records reviewed, including his normal stress test from December 4.            Phone call duration: 8 minutes  "

## 2021-02-02 ENCOUNTER — MYC MEDICAL ADVICE (OUTPATIENT)
Dept: FAMILY MEDICINE | Facility: CLINIC | Age: 81
End: 2021-02-02

## 2021-02-02 DIAGNOSIS — R06.02 SOB (SHORTNESS OF BREATH): ICD-10-CM

## 2021-02-02 DIAGNOSIS — I10 ESSENTIAL HYPERTENSION WITH GOAL BLOOD PRESSURE LESS THAN 140/90: Primary | ICD-10-CM

## 2021-02-02 RX ORDER — CARVEDILOL 25 MG/1
25 TABLET ORAL 2 TIMES DAILY WITH MEALS
Qty: 60 TABLET | Refills: 5 | Status: SHIPPED | OUTPATIENT
Start: 2021-02-02 | End: 2021-04-02 | Stop reason: DRUGHIGH

## 2021-02-04 DIAGNOSIS — R06.00 DYSPNEA: Primary | ICD-10-CM

## 2021-02-04 NOTE — TELEPHONE ENCOUNTER
RECORDS RECEIVED FROM: internal/CE/in process    DATE RECEIVED: 3.1.21   NOTES STATUS DETAILS   OFFICE NOTE from referring provider internal  Cabrera Bradshaw MD   OFFICE NOTE from other specialist na    DISCHARGE SUMMARY from hospital na    DISCHARGE REPORT from the ER ce [facility] 11.18.20 [place]      MEDICATION LIST internal     IMAGING  (NEED IMAGES AND REPORTS)     CT SCAN ce [facility]- 11.18.20    CHEST XRAY (CXR) internal /ce internal 1.3.20   [facility]- 11.18.20   TESTS     PULMONARY FUNCTION TESTING (PFT) In process        Action 2.4.21 sv   Action Taken Image request sent to joon for   CT-11.18.20 -- received image in PACS--   CXR-11.18.20 -- received image in PACS--     Message sent to nurse pool to place PCT and CXR orders

## 2021-02-15 ASSESSMENT — ENCOUNTER SYMPTOMS
ALTERED TEMPERATURE REGULATION: 0
SYNCOPE: 0
ORTHOPNEA: 0
HEMOPTYSIS: 0
POSTURAL DYSPNEA: 0
WEIGHT GAIN: 1
NIGHT SWEATS: 0
FATIGUE: 0
LEG PAIN: 0
HYPOTENSION: 0
FEVER: 0
SLEEP DISTURBANCES DUE TO BREATHING: 0
WHEEZING: 1
POLYPHAGIA: 0
LIGHT-HEADEDNESS: 0
COUGH DISTURBING SLEEP: 0
PALPITATIONS: 0
COUGH: 0
INCREASED ENERGY: 0
SHORTNESS OF BREATH: 1
DECREASED APPETITE: 0
DYSPNEA ON EXERTION: 1
EXERCISE INTOLERANCE: 0
WEIGHT LOSS: 0
HALLUCINATIONS: 0
POLYDIPSIA: 0
HYPERTENSION: 1
CHILLS: 0
SNORES LOUDLY: 0
SPUTUM PRODUCTION: 0

## 2021-02-19 ENCOUNTER — MYC MEDICAL ADVICE (OUTPATIENT)
Dept: FAMILY MEDICINE | Facility: CLINIC | Age: 81
End: 2021-02-19

## 2021-02-19 DIAGNOSIS — I10 ESSENTIAL HYPERTENSION WITH GOAL BLOOD PRESSURE LESS THAN 140/90: Primary | ICD-10-CM

## 2021-02-22 RX ORDER — HYDRALAZINE HYDROCHLORIDE 50 MG/1
50 TABLET, FILM COATED ORAL 2 TIMES DAILY
Qty: 60 TABLET | Refills: 1 | Status: SHIPPED | OUTPATIENT
Start: 2021-02-22 | End: 2021-04-02 | Stop reason: SINTOL

## 2021-02-23 ENCOUNTER — MYC MEDICAL ADVICE (OUTPATIENT)
Dept: FAMILY MEDICINE | Facility: CLINIC | Age: 81
End: 2021-02-23

## 2021-03-01 ENCOUNTER — ANCILLARY PROCEDURE (OUTPATIENT)
Dept: GENERAL RADIOLOGY | Facility: CLINIC | Age: 81
End: 2021-03-01
Payer: COMMERCIAL

## 2021-03-01 ENCOUNTER — OFFICE VISIT (OUTPATIENT)
Dept: PULMONOLOGY | Facility: CLINIC | Age: 81
End: 2021-03-01
Attending: FAMILY MEDICINE
Payer: COMMERCIAL

## 2021-03-01 ENCOUNTER — PRE VISIT (OUTPATIENT)
Dept: PULMONOLOGY | Facility: CLINIC | Age: 81
End: 2021-03-01

## 2021-03-01 VITALS
DIASTOLIC BLOOD PRESSURE: 82 MMHG | SYSTOLIC BLOOD PRESSURE: 174 MMHG | OXYGEN SATURATION: 95 % | HEART RATE: 65 BPM | WEIGHT: 193 LBS | RESPIRATION RATE: 16 BRPM | BODY MASS INDEX: 27.69 KG/M2

## 2021-03-01 DIAGNOSIS — R06.02 SOB (SHORTNESS OF BREATH): ICD-10-CM

## 2021-03-01 DIAGNOSIS — R06.00 DYSPNEA: ICD-10-CM

## 2021-03-01 DIAGNOSIS — J44.9 STAGE 3 SEVERE COPD BY GOLD CLASSIFICATION (H): Primary | ICD-10-CM

## 2021-03-01 PROCEDURE — 94060 EVALUATION OF WHEEZING: CPT | Performed by: INTERNAL MEDICINE

## 2021-03-01 PROCEDURE — 94729 DIFFUSING CAPACITY: CPT | Performed by: INTERNAL MEDICINE

## 2021-03-01 PROCEDURE — G0463 HOSPITAL OUTPT CLINIC VISIT: HCPCS

## 2021-03-01 PROCEDURE — 99205 OFFICE O/P NEW HI 60 MIN: CPT | Mod: 25

## 2021-03-01 PROCEDURE — 71046 X-RAY EXAM CHEST 2 VIEWS: CPT | Mod: GC | Performed by: RADIOLOGY

## 2021-03-01 PROCEDURE — 94726 PLETHYSMOGRAPHY LUNG VOLUMES: CPT | Performed by: INTERNAL MEDICINE

## 2021-03-01 ASSESSMENT — PAIN SCALES - GENERAL: PAINLEVEL: NO PAIN (0)

## 2021-03-01 NOTE — NURSING NOTE
Chief Complaint   Patient presents with     Consult     Shortness of Breath      Medications reviewed and updated.  Vitals taken  Carole Dodge CMA

## 2021-03-01 NOTE — PROGRESS NOTES
[facility]  Initial Clinic Note        Assessment and Plan:  Samy Henry is a 81 YO M a h/o HTN, HLD, CAD s/p KAYLEN (2013), and former tobacco abuse who p/w chronic OSCAR. This has been present for ~ 5 months, appears to be stable but mildly inhibits his active lifestyle, is without a cough, and appears to be of non-cardiac etiology per prior work-up. His CXR today was notable for hyperinflation of bilateral lungs and was without an acute cardiopulmonary process. His PFT's were notable for severe airflow obstruction with a significant improvement s/p Albuterol challenge, as well as elevated RV/TLC ratio. We also reviewed his prior CT chest which by our review showed bronchial thickening.     He does have a 10 pack-year smoking history, but quit decades ago and has not had significant shortness of breath until recently.  This makes his story a bit peculiar given he has severe airflow obstruction.  He does have a history of significant 2nd had smoked exposure as a child, and it is possible that he did not reach peak lung function when he was a young adult.  This can cause COPD.  Asthma is possible given his bronchodilator response, but he is missing symptoms of cough and other triggers that are typical for asthma.  At this time we will trial a LAMA/LABA (Anoro Ellipta) inhaler and see him again in 3 months. We reviewed how to properly administer this medication. If there is no improvement, then we can try an inhaled steroid.    The use of beta-blockers can cause fatigue and shortness of breath.  They have also been described as causing bronchospasm.  He is currently on both coreg and metoprolol.  He is having difficulty getting good control of his blood pressure, and today it is elevated with a systolic of 170. His heart rate trend (data he brought in) has HRs in the 50s and 60s.  I recommended to him that he continue to work with his PCP and cardiologist to find medications to help  "with this, and preferably to limit his beta blockade.        Keyon Moore Jr, MD  Internal Medicine, PGY-2  Pager [phone]    Physician Attestation   I, Michele Guillen MD, saw this patient and agree with the findings and plan of care as documented in the note. I have updated the note with changes as appropriate.     Items personally reviewed/procedural attestation: vitals, labs, imaging and agree with the interpretation documented in the note and spirometry report and agree with the interpretation documented in the note.    Severe airflow obstruction consistent with COPD.  Mostly airways disease.  Will try LABA/LAMA.  Hopefully his cardiologist and PCP can work to limit beta blockade and still get good BP control.  Will follow up in 3 months.     Michele Guillne MD    CC: \"OSCAR and wheeze\"    HPI:     Samy Henry is a 81 YO M a h/o HTN, HLD, CAD s/p KAYLEN (2013), and former tobacco abuse who presents with chronic OSCAR and wheeze.    He first appreciated OSCRA and wheeze ~ 4-5 months ago. It is present daily and exacerbated by activity and alleviated by rest. He best notices it when he walks up stairs and while doing aerobic exercises. He uses free weights and the treadmill ~3x/week and has had to decrease the incline in the treadmill and lower the speed d/t his OSCAR. He denies cough. Also denies PND, orthopnea, or current edema. In 11/2020 he had lower extremity edema after increasing his dose of Amlodipine. This was subsequently stopped and his edema resolved. He also denies fevers, chills, sore throat, earaches, headaches, sinus pressure and tenderness. Denies presyncope, syncope. Patient snores at night, but denies AM headaches. Does not use CPAP. Denies prior COVID-19 infection. He has not received the vaccine yet.    He denies a history of asthma or COPD. He quit smoking in the 1970s. He was exposed to 2nd hand smoke as child. Denies pulmonary-related pediatric illnesses. He does not use an " inhaler. Denies history of seasonal allergies. He is concerned that his antihypertensives may be contributing to his OSCAR. He confirms that he is currently on 2 separate beta-blockers.    Exposure History  Tobacco - Former smoker, quit in , ~ 10 PY's  Other inhaled substances - none  Occupation - , no inhalational exposures  Pets - None  Allergies - None  Hobbies -   Travel - None   Home - lives with wife in [place], home built in      PMH:  Past Medical History:   Diagnosis Date     CAD (coronary artery disease) 10/2013    s/p 3 stents -- NL stress test 20     Colorectal polyps 2010    anal polyp removed     FHx: prostate cancer     father     Gout 2008     Hyperlipidemia LDL goal < 100 2006     Hypertension goal BP (blood pressure) < 140/90 2002     NSTEMI (non-ST elevation myocardial infarction) (H) 10/2013     Squamous cell carcinoma 2008    RT CHEEK     Allergies:  No Known Allergies    Social History:  Social History     Socioeconomic History     Marital status:      Spouse name: Not on file     Number of children: 2     Years of education: Not on file     Highest education level: Not on file   Occupational History     Employer: [employer]   Social Needs     Financial resource strain: Not on file     Food insecurity     Worry: Not on file     Inability: Not on file     Transportation needs     Medical: Not on file     Non-medical: Not on file   Tobacco Use     Smoking status: Former Smoker     Packs/day: 1.00     Types: Cigarettes     Start date: 1959     Quit date: 1974     Years since quittin.1     Smokeless tobacco: Never Used   Substance and Sexual Activity     Alcohol use: Yes     Comment: 2 glasses of wine per day      Drug use: No     Sexual activity: Never     Partners: Female   Lifestyle     Physical activity     Days per week: Not on file     Minutes per session: Not on file     Stress: Not on file   Relationships      Social connections     Talks on phone: Not on file     Gets together: Not on file     Attends Islam service: Not on file     Active member of club or organization: Not on file     Attends meetings of clubs or organizations: Not on file     Relationship status: Not on file     Intimate partner violence     Fear of current or ex partner: Not on file     Emotionally abused: Not on file     Physically abused: Not on file     Forced sexual activity: Not on file   Other Topics Concern     Parent/sibling w/ CABG, MI or angioplasty before 65F 55M? No   Social History Narrative     Not on file     Medications:  Current Outpatient Medications   Medication Sig Dispense Refill     ASPIRIN 81 MG PO TABS 1 TABLET DAILY       atorvastatin (LIPITOR) 40 MG tablet Take 1 tablet (40 mg) by mouth daily 90 tablet 3     carvedilol (COREG) 25 MG tablet Take 1 tablet (25 mg) by mouth 2 times daily (with meals) 60 tablet 5     hydrALAZINE (APRESOLINE) 50 MG tablet Take 1 tablet (50 mg) by mouth 2 times daily 60 tablet 1     lisinopril (ZESTRIL) 40 MG tablet Take 1 tablet (40 mg) by mouth daily 30 tablet 5     MULTI-VITAMIN PO TABS 1 TABLET DAILY       nitroGLYcerin (NITROSTAT) 0.4 MG sublingual tablet Place 1 tablet (0.4 mg) under the tongue every 5 minutes as needed 25 tablet 1     Family History:  Family History   Problem Relation Age of Onset     Hypertension Mother      Breast Cancer Mother      Cancer - colorectal Father      Prostate Cancer Father      Cancer Brother         lymphoma     Hypertension Sister      Neurologic Disorder Maternal Grandmother      Cancer Paternal Grandfather      ROS:   Answers for HPI/ROS submitted by the patient on 2/15/2021   General Symptoms: Yes  Skin Symptoms: No  HENT Symptoms: No  EYE SYMPTOMS: No  HEART SYMPTOMS: Yes  LUNG SYMPTOMS: Yes  INTESTINAL SYMPTOMS: No  URINARY SYMPTOMS: No  REPRODUCTIVE SYMPTOMS: No  SKELETAL SYMPTOMS: No  BLOOD SYMPTOMS: No  NERVOUS SYSTEM SYMPTOMS: No  MENTAL HEALTH  SYMPTOMS: No  Fever: No  Loss of appetite: No  Weight loss: No  Weight gain: Yes  Fatigue: No  Night sweats: No  Chills: No  Increased stress: No  Excessive hunger: No  Excessive thirst: No  Feeling hot or cold when others believe the temperature is normal: No  Loss of height: No  Post-operative complications: No  Surgical site pain: No  Hallucinations: No  Change in or Loss of Energy: No  Hyperactivity: No  Confusion: No  Cough: No  Sputum or phlegm: No  Coughing up blood: No  Difficulty breating or shortness of breath: Yes  Snoring: No  Wheezing: Yes  Difficulty breathing on exertion: Yes  Nighttime Cough: No  Difficulty breathing when lying flat: No  Chest pain or pressure: No  Fast or irregular heartbeat: No  Pain in legs with walking: No  Trouble breathing while lying down: No  Fingers or toes appear blue: No  High blood pressure: Yes  Low blood pressure: No  Fainting: No  Murmurs: No  Pacemaker: No  Varicose veins: No  Edema or swelling: Yes  Wake up at night with shortness of breath: No  Light-headedness: No  Exercise intolerance: No      Physical Exam:  BP (!) 174/82   Pulse 65   Resp 16   Wt 87.5 kg (193 lb)   SpO2 95%   BMI 27.69 kg/m      General: pleasant man, cooperative with exam, in NAD   HEENT: NC, AT, MMM, +dentures, no cobblestoning, no thrush  Neck: no cervical LAD  Chest: CTAB, no wheezing, speaking in full sentences, on RA  Cardiac: RRR no murmurs, rubs or gallops  Abdomen: Soft, flat, non tender, active BS  Extremities: No LE Edema  Neuro: A&Ox4, no focal defecits  Skin: no rash noted    Labs and Radiology:  Reviewed prior CBC with diff from OSH on 11/2020, which was negative for eosinophilia.  All cardiac studies reviewed by me.   All imaging studies reviewed by me including CXR today and CT chest last year.  Chest CT without any signifcant emphysema or fibrosis.  Bronchial walls appear thickened.     PFT's:  Most Recent [facility] Pulmonary Function Testing    FVC-Pred   Date Value Ref Range  Status   03/01/2021 3.93 L      FVC-Pre   Date Value Ref Range Status   03/01/2021 2.76 L      FVC-%Pred-Pre   Date Value Ref Range Status   03/01/2021 70 %      FEV1-Pre   Date Value Ref Range Status   03/01/2021 1.19 L      FEV1-%Pred-Pre   Date Value Ref Range Status   03/01/2021 40 %      FEV1FVC-Pred   Date Value Ref Range Status   03/01/2021 75 %      FEV1FVC-Pre   Date Value Ref Range Status   03/01/2021 43 %      No results found for: 20029  FEFMax-Pred   Date Value Ref Range Status   03/01/2021 7.36 L/sec      FEFMax-Pre   Date Value Ref Range Status   03/01/2021 3.42 L/sec      FEFMax-%Pred-Pre   Date Value Ref Range Status   03/01/2021 46 %      ExpTime-Pre   Date Value Ref Range Status   03/01/2021 10.35 sec      FIFMax-Pre   Date Value Ref Range Status   03/01/2021 4.85 L/sec      FEV1FEV6-Pred   Date Value Ref Range Status   03/01/2021 76 %      FEV1FEV6-Pre   Date Value Ref Range Status   03/01/2021 49 %      No results found for: 20055

## 2021-03-01 NOTE — LETTER
3/1/2021         RE: Samy Henry  [address] [address]        Dear Colleague,    Thank you for referring your patient, Samy Henry, to the [facility] [place]. Please see a copy of my visit note below.    [facility]  Initial Clinic Note        Assessment and Plan:  Samy Henry is a 81 YO M a h/o HTN, HLD, CAD s/p KAYLEN (2013), and former tobacco abuse who p/w chronic OSCAR. This has been present for ~ 5 months, appears to be stable but mildly inhibits his active lifestyle, is without a cough, and appears to be of non-cardiac etiology per prior work-up. His CXR today was notable for hyperinflation of bilateral lungs and was without an acute cardiopulmonary process. His PFT's were notable for severe airflow obstruction with a significant improvement s/p Albuterol challenge, as well as elevated RV/TLC ratio. We also reviewed his prior CT chest which by our review showed bronchial thickening.     He does have a 10 pack-year smoking history, but quit decades ago and has not had significant shortness of breath until recently.  This makes his story a bit peculiar given he has severe airflow obstruction.  He does have a history of significant 2nd had smoked exposure as a child, and it is possible that he did not reach peak lung function when he was a young adult.  This can cause COPD.  Asthma is possible given his bronchodilator response, but he is missing symptoms of cough and other triggers that are typical for asthma.  At this time we will trial a LAMA/LABA (Anoro Ellipta) inhaler and see him again in 3 months. We reviewed how to properly administer this medication. If there is no improvement, then we can try an inhaled steroid.    The use of beta-blockers can cause fatigue and shortness of breath.  They have also been described as causing bronchospasm.  He is currently on both coreg and metoprolol.  " He is having difficulty getting good control of his blood pressure, and today it is elevated with a systolic of 170. His heart rate trend (data he brought in) has HRs in the 50s and 60s.  I recommended to him that he continue to work with his PCP and cardiologist to find medications to help with this, and preferably to limit his beta blockade.        Keyon Moore Jr, MD  Internal Medicine, PGY-2  Pager [phone]    Physician Attestation   I, Michele Guillen MD, saw this patient and agree with the findings and plan of care as documented in the note. I have updated the note with changes as appropriate.     Items personally reviewed/procedural attestation: vitals, labs, imaging and agree with the interpretation documented in the note and spirometry report and agree with the interpretation documented in the note.    Severe airflow obstruction consistent with COPD.  Mostly airways disease.  Will try LABA/LAMA.  Hopefully his cardiologist and PCP can work to limit beta blockade and still get good BP control.  Will follow up in 3 months.     Micheel Guillen MD    CC: \"OSCAR and wheeze\"    HPI:     Samy Henry is a 79 YO M a h/o HTN, HLD, CAD s/p KAYLEN (2013), and former tobacco abuse who presents with chronic OSCAR and wheeze.    He first appreciated OSCAR and wheeze ~ 4-5 months ago. It is present daily and exacerbated by activity and alleviated by rest. He best notices it when he walks up stairs and while doing aerobic exercises. He uses free weights and the treadmill ~3x/week and has had to decrease the incline in the treadmill and lower the speed d/t his OSCAR. He denies cough. Also denies PND, orthopnea, or current edema. In 11/2020 he had lower extremity edema after increasing his dose of Amlodipine. This was subsequently stopped and his edema resolved. He also denies fevers, chills, sore throat, earaches, headaches, sinus pressure and tenderness. Denies presyncope, syncope. Patient snores at night, but " denies AM headaches. Does not use CPAP. Denies prior COVID-19 infection. He has not received the vaccine yet.    He denies a history of asthma or COPD. He quit smoking in the . He was exposed to 2nd hand smoke as child. Denies pulmonary-related pediatric illnesses. He does not use an inhaler. Denies history of seasonal allergies. He is concerned that his antihypertensives may be contributing to his OSCAR. He confirms that he is currently on 2 separate beta-blockers.    Exposure History  Tobacco - Former smoker, quit in , ~ 10 PY's  Other inhaled substances - none  Occupation - , no inhalational exposures  Pets - None  Allergies - None  Hobbies -   Travel - None   Home - lives with wife in [place], home built in      PMH:  Past Medical History:   Diagnosis Date     CAD (coronary artery disease) 10/2013    s/p 3 stents -- NL stress test 20     Colorectal polyps 2010    anal polyp removed     FHx: prostate cancer     father     Gout 2008     Hyperlipidemia LDL goal < 100 2006     Hypertension goal BP (blood pressure) < 140/90 2002     NSTEMI (non-ST elevation myocardial infarction) (H) 10/2013     Squamous cell carcinoma 2008    RT CHEEK     Allergies:  No Known Allergies    Social History:  Social History     Socioeconomic History     Marital status:      Spouse name: Not on file     Number of children: 2     Years of education: Not on file     Highest education level: Not on file   Occupational History     Employer: [employer]   Social Needs     Financial resource strain: Not on file     Food insecurity     Worry: Not on file     Inability: Not on file     Transportation needs     Medical: Not on file     Non-medical: Not on file   Tobacco Use     Smoking status: Former Smoker     Packs/day: 1.00     Types: Cigarettes     Start date: 1959     Quit date: 1974     Years since quittin.1     Smokeless tobacco: Never Used   Substance  and Sexual Activity     Alcohol use: Yes     Comment: 2 glasses of wine per day      Drug use: No     Sexual activity: Never     Partners: Female   Lifestyle     Physical activity     Days per week: Not on file     Minutes per session: Not on file     Stress: Not on file   Relationships     Social connections     Talks on phone: Not on file     Gets together: Not on file     Attends Latter day service: Not on file     Active member of club or organization: Not on file     Attends meetings of clubs or organizations: Not on file     Relationship status: Not on file     Intimate partner violence     Fear of current or ex partner: Not on file     Emotionally abused: Not on file     Physically abused: Not on file     Forced sexual activity: Not on file   Other Topics Concern     Parent/sibling w/ CABG, MI or angioplasty before 65F 55M? No   Social History Narrative     Not on file     Medications:  Current Outpatient Medications   Medication Sig Dispense Refill     ASPIRIN 81 MG PO TABS 1 TABLET DAILY       atorvastatin (LIPITOR) 40 MG tablet Take 1 tablet (40 mg) by mouth daily 90 tablet 3     carvedilol (COREG) 25 MG tablet Take 1 tablet (25 mg) by mouth 2 times daily (with meals) 60 tablet 5     hydrALAZINE (APRESOLINE) 50 MG tablet Take 1 tablet (50 mg) by mouth 2 times daily 60 tablet 1     lisinopril (ZESTRIL) 40 MG tablet Take 1 tablet (40 mg) by mouth daily 30 tablet 5     MULTI-VITAMIN PO TABS 1 TABLET DAILY       nitroGLYcerin (NITROSTAT) 0.4 MG sublingual tablet Place 1 tablet (0.4 mg) under the tongue every 5 minutes as needed 25 tablet 1     Family History:  Family History   Problem Relation Age of Onset     Hypertension Mother      Breast Cancer Mother      Cancer - colorectal Father      Prostate Cancer Father      Cancer Brother         lymphoma     Hypertension Sister      Neurologic Disorder Maternal Grandmother      Cancer Paternal Grandfather      ROS:   Answers for HPI/ROS submitted by the patient on  2/15/2021   General Symptoms: Yes  Skin Symptoms: No  HENT Symptoms: No  EYE SYMPTOMS: No  HEART SYMPTOMS: Yes  LUNG SYMPTOMS: Yes  INTESTINAL SYMPTOMS: No  URINARY SYMPTOMS: No  REPRODUCTIVE SYMPTOMS: No  SKELETAL SYMPTOMS: No  BLOOD SYMPTOMS: No  NERVOUS SYSTEM SYMPTOMS: No  MENTAL HEALTH SYMPTOMS: No  Fever: No  Loss of appetite: No  Weight loss: No  Weight gain: Yes  Fatigue: No  Night sweats: No  Chills: No  Increased stress: No  Excessive hunger: No  Excessive thirst: No  Feeling hot or cold when others believe the temperature is normal: No  Loss of height: No  Post-operative complications: No  Surgical site pain: No  Hallucinations: No  Change in or Loss of Energy: No  Hyperactivity: No  Confusion: No  Cough: No  Sputum or phlegm: No  Coughing up blood: No  Difficulty breating or shortness of breath: Yes  Snoring: No  Wheezing: Yes  Difficulty breathing on exertion: Yes  Nighttime Cough: No  Difficulty breathing when lying flat: No  Chest pain or pressure: No  Fast or irregular heartbeat: No  Pain in legs with walking: No  Trouble breathing while lying down: No  Fingers or toes appear blue: No  High blood pressure: Yes  Low blood pressure: No  Fainting: No  Murmurs: No  Pacemaker: No  Varicose veins: No  Edema or swelling: Yes  Wake up at night with shortness of breath: No  Light-headedness: No  Exercise intolerance: No      Physical Exam:  BP (!) 174/82   Pulse 65   Resp 16   Wt 87.5 kg (193 lb)   SpO2 95%   BMI 27.69 kg/m      General: pleasant man, cooperative with exam, in NAD   HEENT: NC, AT, MMM, +dentures, no cobblestoning, no thrush  Neck: no cervical LAD  Chest: CTAB, no wheezing, speaking in full sentences, on RA  Cardiac: RRR no murmurs, rubs or gallops  Abdomen: Soft, flat, non tender, active BS  Extremities: No LE Edema  Neuro: A&Ox4, no focal defecits  Skin: no rash noted    Labs and Radiology:  Reviewed prior CBC with diff from OSH on 11/2020, which was negative for eosinophilia.  All  cardiac studies reviewed by me.   All imaging studies reviewed by me including CXR today and CT chest last year.  Chest CT without any signifcant emphysema or fibrosis.  Bronchial walls appear thickened.     PFT's:  Most Recent [facility] Pulmonary Function Testing    FVC-Pred   Date Value Ref Range Status   03/01/2021 3.93 L      FVC-Pre   Date Value Ref Range Status   03/01/2021 2.76 L      FVC-%Pred-Pre   Date Value Ref Range Status   03/01/2021 70 %      FEV1-Pre   Date Value Ref Range Status   03/01/2021 1.19 L      FEV1-%Pred-Pre   Date Value Ref Range Status   03/01/2021 40 %      FEV1FVC-Pred   Date Value Ref Range Status   03/01/2021 75 %      FEV1FVC-Pre   Date Value Ref Range Status   03/01/2021 43 %      No results found for: 20029  FEFMax-Pred   Date Value Ref Range Status   03/01/2021 7.36 L/sec      FEFMax-Pre   Date Value Ref Range Status   03/01/2021 3.42 L/sec      FEFMax-%Pred-Pre   Date Value Ref Range Status   03/01/2021 46 %      ExpTime-Pre   Date Value Ref Range Status   03/01/2021 10.35 sec      FIFMax-Pre   Date Value Ref Range Status   03/01/2021 4.85 L/sec      FEV1FEV6-Pred   Date Value Ref Range Status   03/01/2021 76 %      FEV1FEV6-Pre   Date Value Ref Range Status   03/01/2021 49 %      No results found for: 20055      Again, thank you for allowing me to participate in the care of your patient.        Sincerely,        Michele Guillen MD

## 2021-03-02 ENCOUNTER — MYC MEDICAL ADVICE (OUTPATIENT)
Dept: PULMONOLOGY | Facility: CLINIC | Age: 81
End: 2021-03-02

## 2021-03-02 LAB
DLCOUNC-%PRED-PRE: 82 %
DLCOUNC-PRE: 20.26 ML/MIN/MMHG
DLCOUNC-PRED: 24.54 ML/MIN/MMHG
ERV-%PRED-PRE: 63 %
ERV-PRE: 0.58 L
ERV-PRED: 0.91 L
EXPTIME-PRE: 10.35 SEC
FEF2575-%PRED-POST: 27 %
FEF2575-%PRED-PRE: 20 %
FEF2575-POST: 0.57 L/SEC
FEF2575-PRE: 0.42 L/SEC
FEF2575-PRED: 2.07 L/SEC
FEFMAX-%PRED-PRE: 46 %
FEFMAX-PRE: 3.42 L/SEC
FEFMAX-PRED: 7.36 L/SEC
FEV1-%PRED-PRE: 40 %
FEV1-PRE: 1.19 L
FEV1FEV6-PRE: 49 %
FEV1FEV6-PRED: 76 %
FEV1FVC-PRE: 43 %
FEV1FVC-PRED: 75 %
FEV1SVC-PRE: 36 %
FEV1SVC-PRED: 64 %
FIFMAX-PRE: 4.85 L/SEC
FRCPLETH-%PRED-PRE: 125 %
FRCPLETH-PRE: 4.76 L
FRCPLETH-PRED: 3.78 L
FVC-%PRED-PRE: 70 %
FVC-PRE: 2.76 L
FVC-PRED: 3.93 L
IC-%PRED-PRE: 74 %
IC-PRE: 2.7 L
IC-PRED: 3.64 L
RVPLETH-%PRED-PRE: 147 %
RVPLETH-PRE: 4.18 L
RVPLETH-PRED: 2.84 L
TLCPLETH-%PRED-PRE: 104 %
TLCPLETH-PRE: 7.46 L
TLCPLETH-PRED: 7.13 L
VA-%PRED-PRE: 83 %
VA-PRE: 5.23 L
VC-%PRED-PRE: 72 %
VC-PRE: 3.28 L
VC-PRED: 4.55 L

## 2021-03-08 ENCOUNTER — IMMUNIZATION (OUTPATIENT)
Dept: NURSING | Facility: CLINIC | Age: 81
End: 2021-03-08
Payer: COMMERCIAL

## 2021-03-08 PROCEDURE — 0001A PR COVID VAC PFIZER DIL RECON 30 MCG/0.3 ML IM: CPT

## 2021-03-08 PROCEDURE — 91300 PR COVID VAC PFIZER DIL RECON 30 MCG/0.3 ML IM: CPT

## 2021-03-29 ENCOUNTER — IMMUNIZATION (OUTPATIENT)
Dept: NURSING | Facility: CLINIC | Age: 81
End: 2021-03-29
Attending: FAMILY MEDICINE
Payer: COMMERCIAL

## 2021-03-29 PROCEDURE — 0002A PR COVID VAC PFIZER DIL RECON 30 MCG/0.3 ML IM: CPT

## 2021-03-29 PROCEDURE — 91300 PR COVID VAC PFIZER DIL RECON 30 MCG/0.3 ML IM: CPT

## 2021-04-02 ENCOUNTER — OFFICE VISIT (OUTPATIENT)
Dept: FAMILY MEDICINE | Facility: CLINIC | Age: 81
End: 2021-04-02
Payer: COMMERCIAL

## 2021-04-02 VITALS
WEIGHT: 189.5 LBS | DIASTOLIC BLOOD PRESSURE: 90 MMHG | HEIGHT: 70 IN | TEMPERATURE: 97.5 F | OXYGEN SATURATION: 98 % | HEART RATE: 67 BPM | BODY MASS INDEX: 27.13 KG/M2 | SYSTOLIC BLOOD PRESSURE: 209 MMHG

## 2021-04-02 DIAGNOSIS — J44.9 CHRONIC OBSTRUCTIVE PULMONARY DISEASE, UNSPECIFIED COPD TYPE (H): ICD-10-CM

## 2021-04-02 DIAGNOSIS — M1A.00X0 IDIOPATHIC CHRONIC GOUT WITHOUT TOPHUS, UNSPECIFIED SITE: ICD-10-CM

## 2021-04-02 DIAGNOSIS — I10 ESSENTIAL HYPERTENSION WITH GOAL BLOOD PRESSURE LESS THAN 140/90: Primary | ICD-10-CM

## 2021-04-02 LAB
ANION GAP SERPL CALCULATED.3IONS-SCNC: 1 MMOL/L (ref 3–14)
BUN SERPL-MCNC: 13 MG/DL (ref 7–30)
CALCIUM SERPL-MCNC: 9.2 MG/DL (ref 8.5–10.1)
CHLORIDE SERPL-SCNC: 107 MMOL/L (ref 94–109)
CO2 SERPL-SCNC: 34 MMOL/L (ref 20–32)
CREAT SERPL-MCNC: 0.82 MG/DL (ref 0.66–1.25)
GFR SERPL CREATININE-BSD FRML MDRD: 83 ML/MIN/{1.73_M2}
GLUCOSE SERPL-MCNC: 97 MG/DL (ref 70–99)
POTASSIUM SERPL-SCNC: 4 MMOL/L (ref 3.4–5.3)
SODIUM SERPL-SCNC: 142 MMOL/L (ref 133–144)
URATE SERPL-MCNC: 6.1 MG/DL (ref 3.5–7.2)

## 2021-04-02 PROCEDURE — 36415 COLL VENOUS BLD VENIPUNCTURE: CPT | Performed by: FAMILY MEDICINE

## 2021-04-02 PROCEDURE — 99214 OFFICE O/P EST MOD 30 MIN: CPT | Performed by: FAMILY MEDICINE

## 2021-04-02 PROCEDURE — 80048 BASIC METABOLIC PNL TOTAL CA: CPT | Performed by: FAMILY MEDICINE

## 2021-04-02 PROCEDURE — 84550 ASSAY OF BLOOD/URIC ACID: CPT | Performed by: FAMILY MEDICINE

## 2021-04-02 RX ORDER — SPIRONOLACTONE 50 MG/1
50 TABLET, FILM COATED ORAL 2 TIMES DAILY
Qty: 60 TABLET | Refills: 1 | Status: SHIPPED | OUTPATIENT
Start: 2021-04-02 | End: 2021-04-29

## 2021-04-02 RX ORDER — CARVEDILOL 12.5 MG/1
12.5 TABLET ORAL 2 TIMES DAILY WITH MEALS
Qty: 60 TABLET | Refills: 1 | Status: SHIPPED | OUTPATIENT
Start: 2021-04-02 | End: 2021-04-29

## 2021-04-02 ASSESSMENT — MIFFLIN-ST. JEOR: SCORE: 1570.82

## 2021-04-02 NOTE — PROGRESS NOTES
"    Assessment & Plan       ICD-10-CM    1. Essential hypertension with goal blood pressure less than 140/90  I10 Basic metabolic panel     carvedilol (COREG) 12.5 MG tablet     spironolactone (ALDACTONE) 50 MG tablet   2. Idiopathic chronic gout without tophus, unspecified site  M1A.00X0 Uric acid   3. Chronic obstructive pulmonary disease, unspecified COPD type (H)  J44.9      We had a lengthy discussion about his blood pressure treatment and his breathing situation  Discussed the goal of controlling his blood pressure while minimizing any side effects  He seems to tolerate lisinopril well, so we will continue with that medication  His potassium has been low enough that I think we can add spironolactone, so we will add that at 50 mg twice a day  We will reduce his carvedilol to 12.5 mg twice a day to try to minimize any dizziness  Discussed possibly using HCTZ in the future along with allopurinol to keep his uric acid controlled  We could also use some low-dose amlodipine to help with blood pressure but to minimize any edema  I did advise him to start the Anoro Ellipta medication to see if that would help his breathing  Plan a recheck in 3 to 4 weeks with repeat lab work at that time as well    30 minutes spent on the date of the encounter doing chart review, history and exam, documentation and further activities per the note       BMI:   Estimated body mass index is 27.44 kg/m  as calculated from the following:    Height as of this encounter: 1.77 m (5' 9.69\").    Weight as of this encounter: 86 kg (189 lb 8 oz).       Return in about 4 weeks (around 4/30/2021) for BP Recheck, Lab Work.    Cabrera Bradshaw MD  [facility] ROLLY Alfaro is a 81 year old who presents for the following health issues     HPI     Hypertension Follow-up      Do you check your blood pressure regularly outside of the clinic? Yes     Are you following a low salt diet? Yes    Are your blood pressures ever more " than 140 on the top number (systolic) OR more   than 90 on the bottom number (diastolic), for example 140/90? Yes Has brought in his home readings      How many servings of fruits and vegetables do you eat daily?  2-3    On average, how many sweetened beverages do you drink each day (Examples: soda, juice, sweet tea, etc.  Do NOT count diet or artificially sweetened beverages)?   0-1    How many days per week do you exercise enough to make your heart beat faster? 3 or less    How many minutes a day do you exercise enough to make your heart beat faster? 30 - 60    How many days per week do you miss taking your medication? Stopped the hydralazine and only taking carvedilol once daily due to headaches and dizziness.    He has continued to struggle with blood pressure control.  Hydralazine seem to be causing headaches, so he stopped it.  Carvedilol is causing dizziness, so he is down to 1 tablet/day.  He is still taking lisinopril.  He did see pulmonology and was diagnosed with COPD.  He was prescribed Anoro Ellipta for that, but has not started taking it yet.  He previously has been on amlodipine, which caused edema at higher doses, HCTZ, which contributed to gout, and clonidine, which apparently his cardiologist did not like.  He had been on metoprolol, but it lowered his heart rate and may have adversely affected his breathing.  Home blood pressure readings are generally running high currently.  He is still experiencing dyspnea on exertion as well.    Patient Active Problem List   Diagnosis     Colorectal polyps     Advanced directives, counseling/discussion     Hyperlipidemia LDL goal <100     History of non-ST elevation myocardial infarction (NSTEMI)     FHx: prostate cancer     Essential hypertension with goal blood pressure less than 140/90     Idiopathic chronic gout without tophus, unspecified site     Coronary artery disease involving native coronary artery of native heart without angina pectoris     Current  "Outpatient Medications   Medication     ASPIRIN 81 MG PO TABS     atorvastatin (LIPITOR) 40 MG tablet     carvedilol (COREG) 25 MG tablet     lisinopril (ZESTRIL) 40 MG tablet     MULTI-VITAMIN PO TABS     nitroGLYcerin (NITROSTAT) 0.4 MG sublingual tablet     hydrALAZINE (APRESOLINE) 50 MG tablet     umeclidinium-vilanterol (ANORO ELLIPTA) 62.5-25 MCG/INH oral inhaler     No current facility-administered medications for this visit.          Review of Systems   Mainly significant for the above.      Objective    BP (!) 209/90 (BP Location: Left arm, Patient Position: Sitting, Cuff Size: Adult Large)   Pulse 67   Temp 97.5  F (36.4  C) (Oral)   Ht 1.77 m (5' 9.69\")   Wt 86 kg (189 lb 8 oz)   SpO2 98%   BMI 27.44 kg/m    Body mass index is 27.44 kg/m .  Physical Exam   GENERAL: healthy, alert and no distress  MS: no gross musculoskeletal defects noted, no edema    His pulmonology note was reviewed.  Past lab values were reviewed.  Potassium generally has been running on the low normal side.            "

## 2021-04-02 NOTE — RESULT ENCOUNTER NOTE
Dominic,  Your lab results look in good shape and are basically normal at this time.  We will continue to follow these labs as we make adjustments with your blood pressure medications.    Cabrera Bradshaw MD

## 2021-04-29 ENCOUNTER — OFFICE VISIT (OUTPATIENT)
Dept: FAMILY MEDICINE | Facility: CLINIC | Age: 81
End: 2021-04-29
Payer: COMMERCIAL

## 2021-04-29 VITALS
OXYGEN SATURATION: 95 % | DIASTOLIC BLOOD PRESSURE: 70 MMHG | SYSTOLIC BLOOD PRESSURE: 134 MMHG | TEMPERATURE: 97.8 F | HEART RATE: 66 BPM | BODY MASS INDEX: 26.86 KG/M2 | WEIGHT: 185.5 LBS

## 2021-04-29 DIAGNOSIS — M1A.00X0 IDIOPATHIC CHRONIC GOUT WITHOUT TOPHUS, UNSPECIFIED SITE: ICD-10-CM

## 2021-04-29 DIAGNOSIS — I10 ESSENTIAL HYPERTENSION WITH GOAL BLOOD PRESSURE LESS THAN 140/90: ICD-10-CM

## 2021-04-29 DIAGNOSIS — E78.5 HYPERLIPIDEMIA LDL GOAL <100: ICD-10-CM

## 2021-04-29 DIAGNOSIS — J44.9 CHRONIC OBSTRUCTIVE PULMONARY DISEASE, UNSPECIFIED COPD TYPE (H): Primary | ICD-10-CM

## 2021-04-29 LAB
ANION GAP SERPL CALCULATED.3IONS-SCNC: 2 MMOL/L (ref 3–14)
BUN SERPL-MCNC: 23 MG/DL (ref 7–30)
CALCIUM SERPL-MCNC: 9.6 MG/DL (ref 8.5–10.1)
CHLORIDE SERPL-SCNC: 105 MMOL/L (ref 94–109)
CO2 SERPL-SCNC: 31 MMOL/L (ref 20–32)
CREAT SERPL-MCNC: 1 MG/DL (ref 0.66–1.25)
GFR SERPL CREATININE-BSD FRML MDRD: 71 ML/MIN/{1.73_M2}
GLUCOSE SERPL-MCNC: 95 MG/DL (ref 70–99)
POTASSIUM SERPL-SCNC: 4.5 MMOL/L (ref 3.4–5.3)
SODIUM SERPL-SCNC: 138 MMOL/L (ref 133–144)

## 2021-04-29 PROCEDURE — 36415 COLL VENOUS BLD VENIPUNCTURE: CPT | Performed by: FAMILY MEDICINE

## 2021-04-29 PROCEDURE — 99214 OFFICE O/P EST MOD 30 MIN: CPT | Performed by: FAMILY MEDICINE

## 2021-04-29 PROCEDURE — 80048 BASIC METABOLIC PNL TOTAL CA: CPT | Performed by: FAMILY MEDICINE

## 2021-04-29 RX ORDER — LISINOPRIL 40 MG/1
40 TABLET ORAL DAILY
Qty: 90 TABLET | Refills: 1 | Status: SHIPPED | OUTPATIENT
Start: 2021-04-29 | End: 2021-08-23

## 2021-04-29 RX ORDER — CARVEDILOL 12.5 MG/1
12.5 TABLET ORAL 2 TIMES DAILY WITH MEALS
Qty: 180 TABLET | Refills: 1 | Status: SHIPPED | OUTPATIENT
Start: 2021-04-29 | End: 2021-08-23

## 2021-04-29 RX ORDER — SPIRONOLACTONE 50 MG/1
50 TABLET, FILM COATED ORAL 2 TIMES DAILY
Qty: 180 TABLET | Refills: 1 | Status: SHIPPED | OUTPATIENT
Start: 2021-04-29 | End: 2021-08-23

## 2021-04-29 NOTE — RESULT ENCOUNTER NOTE
Dominic,  Your electrolytes and kidney tests continue to look good and are all in the normal range.  Please continue the same blood pressure medications.  We will plan to see you again in about 4 months or so as scheduled.    Cabrera Bradshaw MD

## 2021-04-29 NOTE — PROGRESS NOTES
Assessment & Plan       ICD-10-CM    1. Chronic obstructive pulmonary disease, unspecified COPD type (H)  J44.9    2. Essential hypertension with goal blood pressure less than 140/90  I10 carvedilol (COREG) 12.5 MG tablet     lisinopril (ZESTRIL) 40 MG tablet     spironolactone (ALDACTONE) 50 MG tablet     Basic metabolic panel     Basic metabolic panel     CBC with platelets   3. Hyperlipidemia LDL goal <100  E78.5 Lipid panel reflex to direct LDL Fasting     ALT   4. Idiopathic chronic gout without tophus, unspecified site  M1A.00X0 Uric acid     His blood pressure seems to be nicely controlled now with his current regimen of lisinopril, carvedilol, and spironolactone  We will check a BMP today to make sure his potassium and renal function tests are still acceptable on this regimen  He will be following up with pulmonary medicine in early June  Plan a recheck with me in about 4 months with fasting lab work followed by an annual physical    Return in about 4 months (around 8/29/2021) for BP Recheck, Physical Exam, Lab Work, Routine Visit.    Cabrera Bradshaw MD  [facility] ROLLY Alfaro is a 81 year old who presents for the following health issues     HPI     Hypertension Follow-up      Do you check your blood pressure regularly outside of the clinic? Yes     Are you following a low salt diet? Yes    Are your blood pressures ever more than 140 on the top number (systolic) OR more   than 90 on the bottom number (diastolic), for example 140/90? Yes He has brought in his home readings. 4/4/21 154/85, 4/6/21 160/89, 4/15/21 131/79. His other readings are below 140/90      How many servings of fruits and vegetables do you eat daily?  0-1    On average, how many sweetened beverages do you drink each day (Examples: soda, juice, sweet tea, etc.  Do NOT count diet or artificially sweetened beverages)?   0-1    How many days per week do you exercise enough to make your heart beat faster? 3 or  less    How many minutes a day do you exercise enough to make your heart beat faster? 30 - 60    How many days per week do you miss taking your medication? 0    I had seen him earlier this month and we made some adjustments in his blood pressure medications.  He still had a few high readings and those first few days after I had seen him, but once he made the medication changes, his blood pressure has come down nicely.  In the last couple weeks his readings have been consistently in the 120s to low 130s systolic over 70s diastolic.  He is feeling better.  He is less short of breath.  He is using the inhaler now and is not sure if his improvement in breathing is due to the inhaler or due to the medication changes.  He still gets a little bit of dizziness at times.    Patient Active Problem List   Diagnosis     Colorectal polyps     Advanced directives, counseling/discussion     Hyperlipidemia LDL goal <100     History of non-ST elevation myocardial infarction (NSTEMI)     FHx: prostate cancer     Essential hypertension with goal blood pressure less than 140/90     Idiopathic chronic gout without tophus, unspecified site     Coronary artery disease involving native coronary artery of native heart without angina pectoris     COPD (chronic obstructive pulmonary disease) (H)     Current Outpatient Medications   Medication     ASPIRIN 81 MG PO TABS     atorvastatin (LIPITOR) 40 MG tablet     carvedilol (COREG) 12.5 MG tablet     lisinopril (ZESTRIL) 40 MG tablet     MULTI-VITAMIN PO TABS     nitroGLYcerin (NITROSTAT) 0.4 MG sublingual tablet     spironolactone (ALDACTONE) 50 MG tablet     umeclidinium-vilanterol (ANORO ELLIPTA) 62.5-25 MCG/INH oral inhaler     No current facility-administered medications for this visit.          Review of Systems   Constitutional, HEENT, cardiovascular, pulmonary, gi and gu systems are negative, except as otherwise noted.      Objective    /70 (BP Location: Left arm, Patient  Position: Sitting, Cuff Size: Adult Regular)   Pulse 66   Temp 97.8  F (36.6  C) (Oral)   Wt 84.1 kg (185 lb 8 oz)   SpO2 95%   BMI 26.86 kg/m    Body mass index is 26.86 kg/m .  Physical Exam   GENERAL: healthy, alert and no distress    Previous lab results were reviewed.  Home blood pressure readings were reviewed.

## 2021-06-04 ASSESSMENT — ENCOUNTER SYMPTOMS
DIZZINESS: 1
HOARSE VOICE: 1

## 2021-06-07 ENCOUNTER — OFFICE VISIT (OUTPATIENT)
Dept: PULMONOLOGY | Facility: CLINIC | Age: 81
End: 2021-06-07
Payer: COMMERCIAL

## 2021-06-07 VITALS
BODY MASS INDEX: 26.48 KG/M2 | OXYGEN SATURATION: 96 % | HEART RATE: 67 BPM | DIASTOLIC BLOOD PRESSURE: 73 MMHG | SYSTOLIC BLOOD PRESSURE: 120 MMHG | HEIGHT: 70 IN | WEIGHT: 185 LBS | RESPIRATION RATE: 17 BRPM

## 2021-06-07 DIAGNOSIS — R49.0 HOARSE VOICE QUALITY: Primary | ICD-10-CM

## 2021-06-07 PROCEDURE — G0463 HOSPITAL OUTPT CLINIC VISIT: HCPCS

## 2021-06-07 PROCEDURE — 99214 OFFICE O/P EST MOD 30 MIN: CPT

## 2021-06-07 ASSESSMENT — PAIN SCALES - GENERAL: PAINLEVEL: NO PAIN (0)

## 2021-06-07 ASSESSMENT — MIFFLIN-ST. JEOR: SCORE: 1550.48

## 2021-06-07 NOTE — LETTER
6/7/2021         RE: Samy Henry  [address] [address]        Dear Colleague,    Thank you for referring your patient, Samy Henry, to the [facility] [place]. Please see a copy of my visit note below.    [facility]  Pulmonary Medicine  Visit Clinic Note  June 7, 2021         ASSESSMENT & PLAN       Shortness of breath: Improved with change of his beta-blocker dose.  He does happen to have severe airflow obstruction with a bronchodilator response on pulmonary function testing.  However right now he is not having any respiratory symptoms.  I cannot justify treating him with an inhaler at this moment if he is not having any respiratory symptoms to monitor.  Therefore I think it is appropriate just to clinically follow his breathing.  If he does have worsening shortness of breath in the future, we can start a different inhaler other than Anoro Ellipta since it did not make a difference.    Hoarse voice: He denies any postnasal drip or reflux symptoms.  I will refer him to otolaryngology for further evaluation.      Capo Guillen MD          Today's visit note:     Chief Complaint: Samy Henry is a 81 year old year old male who is being seen for RECHECK (Return 3 month follow up )      HISTORY OF PRESENT ILLNESS:    This is an 81-year-old male with a history of hypertension, who is presenting as a follow-up visit for shortness of breath.  At her last visit, he had shortness of breath as a symptom, and was noted to have severe airflow obstruction on pulmonary function testing.  We gave him a trial of Anoro Ellipta, and he also worked with his physicians to change his blood pressure medications around.  His carvedilol dose decreased from 25 mg twice a day to 12 and half milligrams twice a day.  He thinks that after decreasing the blood pressure medication, his symptoms improved.  This is because, he started  the inhaler shortly after the blood pressure medication change.  He does not think Anoro Ellipta helped out with any respiratory symptoms at all.  In fact right now he does not have any shortness of breath, cough or wheeze.    He is a bit concerned about his hoarse voice.  This started in April of this year, and has been progressing.  There is no pain or swelling in the area.  Things got slightly worse when he started Anoro Ellipta.  He stopped the inhaler because it was not helping out with shortness of breath, and his voice symptoms did not change.           Past Medical and Surgical History:     Past Medical History:   Diagnosis Date     CAD (coronary artery disease) 10/2013    s/p 3 stents -- NL stress test 12/4/20     Colorectal polyps 08/03/2010    anal polyp removed     COPD (chronic obstructive pulmonary disease) (H) 4/2/2021     FHx: prostate cancer     father     Gout 02/2008     Hyperlipidemia LDL goal < 100 04/2006     Hypertension goal BP (blood pressure) < 140/90 05/2002     NSTEMI (non-ST elevation myocardial infarction) (H) 10/2013     Squamous cell carcinoma 05/2008    RT CHEEK     Past Surgical History:   Procedure Laterality Date     CATARACT IOL, RT/LT  06/2009    right     CATARACT IOL, RT/LT  08/13    left     COLONOSCOPY       COLONOSCOPY N/A 8/31/2015    Procedure: COLONOSCOPY;  Surgeon: Francisco Horowitz MD;  Location: [facility]     COLONOSCOPY  09/29/2020     COLONOSCOPY WITH CO2 INSUFFLATION N/A 8/31/2015    Procedure: COLONOSCOPY WITH CO2 INSUFFLATION;  Surgeon: Francisco Horowitz MD;  Location: [facility] OR     removal of rectal polyp  08/10     STENT, CORONARY, SAMANTA  10/13    3 coronary stents s/p NSTEMI     TONSILLECTOMY & ADENOIDECTOMY  1945     VASECTOMY  04/07/78           Family History:     Family History   Problem Relation Age of Onset     Hypertension Mother      Breast Cancer Mother      Cancer - colorectal Father      Prostate Cancer Father      Cancer Brother         lymphoma      Hypertension Sister      Neurologic Disorder Maternal Grandmother      Cancer Paternal Grandfather               Social History:     Social History     Socioeconomic History     Marital status:      Spouse name: Not on file     Number of children: 2     Years of education: Not on file     Highest education level: Not on file   Occupational History     Employer: [employer]   Social Needs     Financial resource strain: Not on file     Food insecurity     Worry: Not on file     Inability: Not on file     Transportation needs     Medical: Not on file     Non-medical: Not on file   Tobacco Use     Smoking status: Former Smoker     Packs/day: 1.00     Types: Cigarettes     Start date: 1959     Quit date: 1974     Years since quittin.4     Smokeless tobacco: Never Used   Substance and Sexual Activity     Alcohol use: Yes     Comment: 2 glasses of wine per day      Drug use: No     Sexual activity: Never     Partners: Female   Lifestyle     Physical activity     Days per week: Not on file     Minutes per session: Not on file     Stress: Not on file   Relationships     Social connections     Talks on phone: Not on file     Gets together: Not on file     Attends Latter-day service: Not on file     Active member of club or organization: Not on file     Attends meetings of clubs or organizations: Not on file     Relationship status: Not on file     Intimate partner violence     Fear of current or ex partner: Not on file     Emotionally abused: Not on file     Physically abused: Not on file     Forced sexual activity: Not on file   Other Topics Concern     Parent/sibling w/ CABG, MI or angioplasty before 65F 55M? No   Social History Narrative     Not on file            Medications:     Current Outpatient Medications   Medication     ASPIRIN 81 MG PO TABS     atorvastatin (LIPITOR) 40 MG tablet     carvedilol (COREG) 12.5 MG tablet     lisinopril (ZESTRIL) 40 MG tablet     MULTI-VITAMIN PO TABS     nitroGLYcerin  "(NITROSTAT) 0.4 MG sublingual tablet     spironolactone (ALDACTONE) 50 MG tablet     umeclidinium-vilanterol (ANORO ELLIPTA) 62.5-25 MCG/INH oral inhaler     No current facility-administered medications for this visit.             Review of Systems:       Answers for HPI/ROS submitted by the patient on 6/4/2021   General Symptoms: No  Skin Symptoms: No  HENT Symptoms: Yes  EYE SYMPTOMS: No  HEART SYMPTOMS: No  LUNG SYMPTOMS: No  INTESTINAL SYMPTOMS: No  URINARY SYMPTOMS: Yes  REPRODUCTIVE SYMPTOMS: No  SKELETAL SYMPTOMS: No  BLOOD SYMPTOMS: No  NERVOUS SYSTEM SYMPTOMS: Yes  MENTAL HEALTH SYMPTOMS: No   Voice hoarseness: Yes  Increased frequency of urination: Yes  Frequent nighttime urination: Yes  Dizziness or trouble with balance: Yes        PHYSICAL EXAM:  /73   Pulse 67   Resp 17   Ht 1.77 m (5' 9.69\")   Wt 83.9 kg (185 lb)   SpO2 96%   BMI 26.78 kg/m       General: pleasant, NAD  Eyes: Anicteric  Ears: Hearing grossly normal  Neck: supple, no thyromegaly  Lymphatics: No cervical or supraclavicular nodes  Respiratory: Good air movement. No crackles. No rhonchi. No wheezes  Cardiac: RRR, normal S1, S2. No murmurs. No JVD  Abdomen: Soft, NT/ND  Musculoskeletal: Extremities normal. No clubbing. No cyanosis. No edema.  Skin: No rash on limited exam  Neuro: Normal mentation. Normal speech.  Psych:Normal affect           Data:   All laboratory and imaging data reviewed.      PFT:       PFT Interpretation:  Severe airflow obstruction  There is a bronchodilator response  Gas trapping, but normal total lung capacity  Mild impairment in diffusion capacity  Valid Maneuver    CXR: I have reviewed the chest x-ray images and agree with the radiologist interpretation below:  PA and lateral views of the chest. Trachea is midline.  Cardiomediastinal silhouette is within normal limits. Coronary artery  stents. Hyperinflation of bilateral lungs with flattening of the  hemidiaphragms. No focal airspace opacity. No " pleural effusion or  pneumothorax is appreciated. Visualized upper abdomen is unremarkable.                                                           IMPRESSION:   No radiographic evidence of acute cardiopulmonary pathology.    Sincerely,      Michele Guillen MD

## 2021-06-07 NOTE — PROGRESS NOTES
[facility]  Pulmonary Medicine  Visit Clinic Note  June 7, 2021         ASSESSMENT & PLAN       ***        RTC in ***       Capo Guillen MD          Today's visit note:     Chief Complaint: Samy Henry is a 81 year old year old male who is being seen for RECHECK (Return 3 month follow up )      HISTORY OF PRESENT ILLNESS:  ***              Past Medical and Surgical History:     Past Medical History:   Diagnosis Date     CAD (coronary artery disease) 10/2013    s/p 3 stents -- NL stress test 12/4/20     Colorectal polyps 08/03/2010    anal polyp removed     COPD (chronic obstructive pulmonary disease) (H) 4/2/2021     FHx: prostate cancer     father     Gout 02/2008     Hyperlipidemia LDL goal < 100 04/2006     Hypertension goal BP (blood pressure) < 140/90 05/2002     NSTEMI (non-ST elevation myocardial infarction) (H) 10/2013     Squamous cell carcinoma 05/2008    RT CHEEK     Past Surgical History:   Procedure Laterality Date     CATARACT IOL, RT/LT  06/2009    right     CATARACT IOL, RT/LT  08/13    left     COLONOSCOPY       COLONOSCOPY N/A 8/31/2015    Procedure: COLONOSCOPY;  Surgeon: Francisco Horowitz MD;  Location: [facility]     COLONOSCOPY  09/29/2020     COLONOSCOPY WITH CO2 INSUFFLATION N/A 8/31/2015    Procedure: COLONOSCOPY WITH CO2 INSUFFLATION;  Surgeon: Francisco Horowitz MD;  Location: [facility] OR     removal of rectal polyp  08/10     STENT, CORONARY, SAMANTA  10/13    3 coronary stents s/p NSTEMI     TONSILLECTOMY & ADENOIDECTOMY  1945     VASECTOMY  04/07/78           Family History:     Family History   Problem Relation Age of Onset     Hypertension Mother      Breast Cancer Mother      Cancer - colorectal Father      Prostate Cancer Father      Cancer Brother         lymphoma     Hypertension Sister      Neurologic Disorder Maternal Grandmother      Cancer Paternal Grandfather               Social History:     Social History     Socioeconomic History     Marital status:       Spouse name: Not on file     Number of children: 2     Years of education: Not on file     Highest education level: Not on file   Occupational History     Employer: [employer]   Social Needs     Financial resource strain: Not on file     Food insecurity     Worry: Not on file     Inability: Not on file     Transportation needs     Medical: Not on file     Non-medical: Not on file   Tobacco Use     Smoking status: Former Smoker     Packs/day: 1.00     Types: Cigarettes     Start date: 1959     Quit date: 1974     Years since quittin.4     Smokeless tobacco: Never Used   Substance and Sexual Activity     Alcohol use: Yes     Comment: 2 glasses of wine per day      Drug use: No     Sexual activity: Never     Partners: Female   Lifestyle     Physical activity     Days per week: Not on file     Minutes per session: Not on file     Stress: Not on file   Relationships     Social connections     Talks on phone: Not on file     Gets together: Not on file     Attends Holiness service: Not on file     Active member of club or organization: Not on file     Attends meetings of clubs or organizations: Not on file     Relationship status: Not on file     Intimate partner violence     Fear of current or ex partner: Not on file     Emotionally abused: Not on file     Physically abused: Not on file     Forced sexual activity: Not on file   Other Topics Concern     Parent/sibling w/ CABG, MI or angioplasty before 65F 55M? No   Social History Narrative     Not on file            Medications:     Current Outpatient Medications   Medication     ASPIRIN 81 MG PO TABS     atorvastatin (LIPITOR) 40 MG tablet     carvedilol (COREG) 12.5 MG tablet     lisinopril (ZESTRIL) 40 MG tablet     MULTI-VITAMIN PO TABS     nitroGLYcerin (NITROSTAT) 0.4 MG sublingual tablet     spironolactone (ALDACTONE) 50 MG tablet     umeclidinium-vilanterol (ANORO ELLIPTA) 62.5-25 MCG/INH oral inhaler     No current facility-administered  "medications for this visit.             Review of Systems:       A complete review of systems was otherwise negative except as noted in the HPI.      PHYSICAL EXAM:  /73   Pulse 67   Resp 17   Ht 1.77 m (5' 9.69\")   Wt 83.9 kg (185 lb)   SpO2 96%   BMI 26.78 kg/m       General: Well developed, well nourished, No apparent distress  Eyes: Anicteric  Nose: Nasal mucosa with no edema or hyperemia.  No polyps  Ears: Hearing grossly normal  Mouth: Oral mucosa is moist, without any lesions. No oropharyngeal exudate.  Neck: supple, no thyromegaly  Lymphatics: No cervical or supraclavicular nodes  Respiratory: Good air movement. No crackles. No rhonchi. No wheezes  Cardiac: RRR, normal S1, S2. No murmurs. No JVD  Abdomen: Soft, NT/ND  Musculoskeletal: Extremities normal. No clubbing. No cyanosis. No edema.  Skin: No rash on limited exam  Neuro: Normal mentation. Normal speech.  Psych:Normal affect           Data:   All laboratory and imaging data reviewed.      PFT:   ***: FEV1/FVC ratio ***. FEV1 *** (***% predicted). FVC *** (***% predicted). DLCO ***       PFT Interpretation:  {PFT INTER:467104}  {Increase/decrease:532177}  {UMP PFT BELOW SIMILAR:186684}  Valid Maneuver    CXR: *** Reviewed by me.     Chest CT: ***    No results found for this or any previous visit (from the past 168 hour(s)).                                      "

## 2021-06-07 NOTE — PROGRESS NOTES
[facility]  Pulmonary Medicine  Visit Clinic Note  June 7, 2021         ASSESSMENT & PLAN       Shortness of breath: Improved with change of his beta-blocker dose.  He does happen to have severe airflow obstruction with a bronchodilator response on pulmonary function testing.  However right now he is not having any respiratory symptoms.  I cannot justify treating him with an inhaler at this moment if he is not having any respiratory symptoms to monitor.  Therefore I think it is appropriate just to clinically follow his breathing.  If he does have worsening shortness of breath in the future, we can start a different inhaler other than Anoro Ellipta since it did not make a difference.    Hoarse voice: He denies any postnasal drip or reflux symptoms.  I will refer him to otolaryngology for further evaluation.      Capo Guillen MD          Today's visit note:     Chief Complaint: Samy Henry is a 81 year old year old male who is being seen for RECHECK (Return 3 month follow up )      HISTORY OF PRESENT ILLNESS:    This is an 81-year-old male with a history of hypertension, who is presenting as a follow-up visit for shortness of breath.  At her last visit, he had shortness of breath as a symptom, and was noted to have severe airflow obstruction on pulmonary function testing.  We gave him a trial of Anoro Ellipta, and he also worked with his physicians to change his blood pressure medications around.  His carvedilol dose decreased from 25 mg twice a day to 12 and half milligrams twice a day.  He thinks that after decreasing the blood pressure medication, his symptoms improved.  This is because, he started the inhaler shortly after the blood pressure medication change.  He does not think Anoro Ellipta helped out with any respiratory symptoms at all.  In fact right now he does not have any shortness of breath, cough or wheeze.    He is a bit concerned about his hoarse voice.  This started in April  of this year, and has been progressing.  There is no pain or swelling in the area.  Things got slightly worse when he started Anoro Ellipta.  He stopped the inhaler because it was not helping out with shortness of breath, and his voice symptoms did not change.           Past Medical and Surgical History:     Past Medical History:   Diagnosis Date     CAD (coronary artery disease) 10/2013    s/p 3 stents -- NL stress test 12/4/20     Colorectal polyps 08/03/2010    anal polyp removed     COPD (chronic obstructive pulmonary disease) (H) 4/2/2021     FHx: prostate cancer     father     Gout 02/2008     Hyperlipidemia LDL goal < 100 04/2006     Hypertension goal BP (blood pressure) < 140/90 05/2002     NSTEMI (non-ST elevation myocardial infarction) (H) 10/2013     Squamous cell carcinoma 05/2008    RT CHEEK     Past Surgical History:   Procedure Laterality Date     CATARACT IOL, RT/LT  06/2009    right     CATARACT IOL, RT/LT  08/13    left     COLONOSCOPY       COLONOSCOPY N/A 8/31/2015    Procedure: COLONOSCOPY;  Surgeon: Francisco Horowitz MD;  Location: [facility] OR     COLONOSCOPY  09/29/2020     COLONOSCOPY WITH CO2 INSUFFLATION N/A 8/31/2015    Procedure: COLONOSCOPY WITH CO2 INSUFFLATION;  Surgeon: Francisco Horowitz MD;  Location: [facility] OR     removal of rectal polyp  08/10     STENT, CORONARY, SAMANTA  10/13    3 coronary stents s/p NSTEMI     TONSILLECTOMY & ADENOIDECTOMY  1945     VASECTOMY  04/07/78           Family History:     Family History   Problem Relation Age of Onset     Hypertension Mother      Breast Cancer Mother      Cancer - colorectal Father      Prostate Cancer Father      Cancer Brother         lymphoma     Hypertension Sister      Neurologic Disorder Maternal Grandmother      Cancer Paternal Grandfather               Social History:     Social History     Socioeconomic History     Marital status:      Spouse name: Not on file     Number of children: 2     Years of education: Not on file      Highest education level: Not on file   Occupational History     Employer: [employer]   Social Needs     Financial resource strain: Not on file     Food insecurity     Worry: Not on file     Inability: Not on file     Transportation needs     Medical: Not on file     Non-medical: Not on file   Tobacco Use     Smoking status: Former Smoker     Packs/day: 1.00     Types: Cigarettes     Start date: 1959     Quit date: 1974     Years since quittin.4     Smokeless tobacco: Never Used   Substance and Sexual Activity     Alcohol use: Yes     Comment: 2 glasses of wine per day      Drug use: No     Sexual activity: Never     Partners: Female   Lifestyle     Physical activity     Days per week: Not on file     Minutes per session: Not on file     Stress: Not on file   Relationships     Social connections     Talks on phone: Not on file     Gets together: Not on file     Attends Christian service: Not on file     Active member of club or organization: Not on file     Attends meetings of clubs or organizations: Not on file     Relationship status: Not on file     Intimate partner violence     Fear of current or ex partner: Not on file     Emotionally abused: Not on file     Physically abused: Not on file     Forced sexual activity: Not on file   Other Topics Concern     Parent/sibling w/ CABG, MI or angioplasty before 65F 55M? No   Social History Narrative     Not on file            Medications:     Current Outpatient Medications   Medication     ASPIRIN 81 MG PO TABS     atorvastatin (LIPITOR) 40 MG tablet     carvedilol (COREG) 12.5 MG tablet     lisinopril (ZESTRIL) 40 MG tablet     MULTI-VITAMIN PO TABS     nitroGLYcerin (NITROSTAT) 0.4 MG sublingual tablet     spironolactone (ALDACTONE) 50 MG tablet     umeclidinium-vilanterol (ANORO ELLIPTA) 62.5-25 MCG/INH oral inhaler     No current facility-administered medications for this visit.             Review of Systems:       Answers for HPI/ROS submitted by the  "patient on 6/4/2021   General Symptoms: No  Skin Symptoms: No  HENT Symptoms: Yes  EYE SYMPTOMS: No  HEART SYMPTOMS: No  LUNG SYMPTOMS: No  INTESTINAL SYMPTOMS: No  URINARY SYMPTOMS: Yes  REPRODUCTIVE SYMPTOMS: No  SKELETAL SYMPTOMS: No  BLOOD SYMPTOMS: No  NERVOUS SYSTEM SYMPTOMS: Yes  MENTAL HEALTH SYMPTOMS: No   Voice hoarseness: Yes  Increased frequency of urination: Yes  Frequent nighttime urination: Yes  Dizziness or trouble with balance: Yes        PHYSICAL EXAM:  /73   Pulse 67   Resp 17   Ht 1.77 m (5' 9.69\")   Wt 83.9 kg (185 lb)   SpO2 96%   BMI 26.78 kg/m       General: pleasant, NAD  Eyes: Anicteric  Ears: Hearing grossly normal  Neck: supple, no thyromegaly  Lymphatics: No cervical or supraclavicular nodes  Respiratory: Good air movement. No crackles. No rhonchi. No wheezes  Cardiac: RRR, normal S1, S2. No murmurs. No JVD  Abdomen: Soft, NT/ND  Musculoskeletal: Extremities normal. No clubbing. No cyanosis. No edema.  Skin: No rash on limited exam  Neuro: Normal mentation. Normal speech.  Psych:Normal affect           Data:   All laboratory and imaging data reviewed.      PFT:       PFT Interpretation:  Severe airflow obstruction  There is a bronchodilator response  Gas trapping, but normal total lung capacity  Mild impairment in diffusion capacity  Valid Maneuver    CXR: I have reviewed the chest x-ray images and agree with the radiologist interpretation below:  PA and lateral views of the chest. Trachea is midline.  Cardiomediastinal silhouette is within normal limits. Coronary artery  stents. Hyperinflation of bilateral lungs with flattening of the  hemidiaphragms. No focal airspace opacity. No pleural effusion or  pneumothorax is appreciated. Visualized upper abdomen is unremarkable.                                                                      IMPRESSION:   No radiographic evidence of acute cardiopulmonary pathology.                                    "

## 2021-06-09 ENCOUNTER — MEDICAL CORRESPONDENCE (OUTPATIENT)
Dept: HEALTH INFORMATION MANAGEMENT | Facility: CLINIC | Age: 81
End: 2021-06-09

## 2021-06-09 ENCOUNTER — OFFICE VISIT (OUTPATIENT)
Dept: OTOLARYNGOLOGY | Facility: CLINIC | Age: 81
End: 2021-06-09
Payer: COMMERCIAL

## 2021-06-09 VITALS
SYSTOLIC BLOOD PRESSURE: 137 MMHG | RESPIRATION RATE: 16 BRPM | OXYGEN SATURATION: 97 % | HEART RATE: 75 BPM | DIASTOLIC BLOOD PRESSURE: 74 MMHG

## 2021-06-09 DIAGNOSIS — J38.7 PRESBYLARYNGES: ICD-10-CM

## 2021-06-09 DIAGNOSIS — R49.0 HOARSENESS: Primary | ICD-10-CM

## 2021-06-09 PROCEDURE — 31575 DIAGNOSTIC LARYNGOSCOPY: CPT | Performed by: OTOLARYNGOLOGY

## 2021-06-09 PROCEDURE — 99203 OFFICE O/P NEW LOW 30 MIN: CPT | Mod: 25 | Performed by: OTOLARYNGOLOGY

## 2021-06-09 NOTE — TELEPHONE ENCOUNTER
FUTURE VISIT INFORMATION      FUTURE VISIT INFORMATION:    Date: 7/5/21    Time: 9 AM with SLP    Location: [facility]  REFERRAL INFORMATION:    Referring provider:  Dr. Michele Guillen    Referring providers clinic:  [facility] - Pulmonology    Reason for visit/diagnosis: Hoarse Voice Quality    RECORDS REQUESTED FROM:       Clinic name Comments Records Status Imaging Status   [facility] 6/7/21, 3/1/21 - [facility] - PCC OV with Dr. Bradshaw  1/3/20 - [facility] Procedure 3/1/21 [facility] 3/1/21 - XR Chest  1/3/20 - XR Chest Epic PACs   [facility] 11/20/20 - CARDIO OV with Dr. Rajan Ulrich Everywhere    [facility] 11/18/20 - ED OV with Dr. Carlee Ulrich Everywhere    [facility] - Imaging 11/18/20 - CT Chest  11/18/20 - XR Chest Care Everywhere PACs

## 2021-06-09 NOTE — LETTER
6/9/2021         RE: Samy Henry  [facility] [facility] [id number]        Dear Colleague,    Thank you for referring your patient, Samy Henry, to the [facility]. Please see a copy of my visit note below.    Chief Complaint - hoarseness    History of Present Illness - Samy Henry is a 81 year old male who presents with a history of hoarseness since 6 months ago. Around the time of onset, he wasn't sick. The patient denies any recent intubations or throat procedures. No pain, hemoptysis. He swallows food okay. No lumps in the head or neck. quit smoking 40 years ago. The patient notes no reflux symptoms. He was placed on an inhaler 3-4 months ago for shortness of breath. He stopped the inhaler awhile ago.     Past Medical History -   Patient Active Problem List   Diagnosis     Colorectal polyps     Advanced directives, counseling/discussion     Hyperlipidemia LDL goal <100     History of non-ST elevation myocardial infarction (NSTEMI)     FHx: prostate cancer     Essential hypertension with goal blood pressure less than 140/90     Idiopathic chronic gout without tophus, unspecified site     Coronary artery disease involving native coronary artery of native heart without angina pectoris     COPD (chronic obstructive pulmonary disease) (H)       Current Medications -   Current Outpatient Medications:      ASPIRIN 81 MG PO TABS, 1 TABLET DAILY, Disp: , Rfl:      atorvastatin (LIPITOR) 40 MG tablet, Take 1 tablet (40 mg) by mouth daily, Disp: 90 tablet, Rfl: 3     carvedilol (COREG) 12.5 MG tablet, Take 1 tablet (12.5 mg) by mouth 2 times daily (with meals), Disp: 180 tablet, Rfl: 1     lisinopril (ZESTRIL) 40 MG tablet, Take 1 tablet (40 mg) by mouth daily, Disp: 90 tablet, Rfl: 1     MULTI-VITAMIN PO TABS, 1 TABLET DAILY, Disp: , Rfl:      nitroGLYcerin (NITROSTAT) 0.4 MG sublingual tablet, Place 1 tablet (0.4 mg) under the tongue every 5 minutes as needed, Disp: 25  tablet, Rfl: 1     spironolactone (ALDACTONE) 50 MG tablet, Take 1 tablet (50 mg) by mouth 2 times daily, Disp: 180 tablet, Rfl: 1    Allergies - No Known Allergies    Social History -   Social History     Socioeconomic History     Marital status:      Spouse name: None     Number of children: 2     Years of education: None     Highest education level: None   Occupational History     Employer: [employer]   Social Needs     Financial resource strain: None     Food insecurity     Worry: None     Inability: None     Transportation needs     Medical: None     Non-medical: None   Tobacco Use     Smoking status: Former Smoker     Packs/day: 1.00     Types: Cigarettes     Start date: 1959     Quit date: 1974     Years since quittin.4     Smokeless tobacco: Never Used   Substance and Sexual Activity     Alcohol use: Yes     Comment: 2 glasses of wine per day      Drug use: No     Sexual activity: Never     Partners: Female   Lifestyle     Physical activity     Days per week: None     Minutes per session: None     Stress: None   Relationships     Social connections     Talks on phone: None     Gets together: None     Attends Taoism service: None     Active member of club or organization: None     Attends meetings of clubs or organizations: None     Relationship status: None     Intimate partner violence     Fear of current or ex partner: None     Emotionally abused: None     Physically abused: None     Forced sexual activity: None   Other Topics Concern     Parent/sibling w/ CABG, MI or angioplasty before 65F 55M? No   Social History Narrative     None       Family History -   Family History   Problem Relation Age of Onset     Hypertension Mother      Breast Cancer Mother      Cancer - colorectal Father      Prostate Cancer Father      Cancer Brother         lymphoma     Hypertension Sister      Neurologic Disorder Maternal Grandmother      Cancer Paternal Grandfather        Review of Systems - As per HPI  and PMHx, otherwise 7 system review of the head and neck is negative.    Physical Exam  /74   Pulse 75   Resp 16   SpO2 97%   General - The patient is nontoxic.  Alert and oriented to person and place, answers questions and cooperates with examination appropriately.   Voice and Breathing - The patient was breathing comfortably without the use of accessory muscles. There was no wheezing, stridor, or stertor.  The patients voice was hoarse and raspy, somewhat weak.  Eyes - Extraocular movements intact.  Sclera were not icteric or injected, conjunctiva were pink and moist.  Mouth - Examination of the oral cavity showed pink, healthy oral mucosa. No lesions or ulcerations noted.  The tongue was mobile and midline, and the dentition were in good condition.    Throat - The walls of the oropharynx were smooth, pink, moist, symmetric, and had no lesions or ulcerations.  The tonsillar pillars and soft palate were symmetric.  The uvula was midline on elevation.  Nose - External contour is symmetric, no gross deflection or scars.  Nasal mucosa is pink and moist with no abnormal mucus.  The septum was midline and non-obstructive, turbinates of normal size and position.  No polyps, masses, or purulence noted on examination.  Neck -  Soft, non-tender. Palpation of the occipital, submental, submandibular, internal jugular chain, and supraclavicular nodes did not demonstrate any abnormal lymph nodes or masses. Parotids without masses. Palpation of the thyroid was soft and smooth, with no nodules or goiter appreciated.  The trachea was mobile and midline. No pain of the anterior neck.  Neurologic - CN II-XII are grossly intact. No focal neurologic deficits.   Cardiovascular - carotid pulses are 2+ bilaterally, regular rhythm      Procedure: Flexible Endoscopy  Indication: Hoarseness    To best visualize the upper airway anatomy and due to the chief complaint and HPI, I proceeded with a fiberoptic examination.  First I  sprayed the right side of the nose with a mixture of lidocaine and neosynephrine.  I then passed the scope through the nasal cavity.  The nasal cavity was unremarkable.  The nasopharynx was mucosally covered and symmetric.  The Eustachian tube openings were unobstructed.  Going further down I had a clear view of the base of tongue which had normal appearing lingual tonsillar tissue.  The base of tongue was free of lesions, and the vallecula was open.  The epiglottis was smooth and mucosally covered.  The supraglottic larynx was then clearly visualized. It was normal. The vocal cords moved with full abduction and adduction. However, he had vocal cord bowing and presbylarynges. The cords were white and no lesions were seen.  The pyriform sinuses were open, and the limited view of the postcricoid region did not show any lesions.      A/P - Samy Henry is a 81 year old male with hoarseness but no evidence of infection, inflammation, or neoplasm on exam to explain the symptoms. He has presbylarynges. I offered voice therapy or vocal cord injection. He prefers to do nothing. He was happy with knowing this was not anything serious. Return prn.     Dr. Bacilio Case  Otolaryngology  [facility]        Again, thank you for allowing me to participate in the care of your patient.        Sincerely,        Bacilio Case MD

## 2021-06-09 NOTE — PROGRESS NOTES
Chief Complaint - hoarseness    History of Present Illness - Samy Henry is a 81 year old male who presents with a history of hoarseness since 6 months ago. Around the time of onset, he wasn't sick. The patient denies any recent intubations or throat procedures. No pain, hemoptysis. He swallows food okay. No lumps in the head or neck. quit smoking 40 years ago. The patient notes no reflux symptoms. He was placed on an inhaler 3-4 months ago for shortness of breath. He stopped the inhaler awhile ago.     Past Medical History -   Patient Active Problem List   Diagnosis     Colorectal polyps     Advanced directives, counseling/discussion     Hyperlipidemia LDL goal <100     History of non-ST elevation myocardial infarction (NSTEMI)     FHx: prostate cancer     Essential hypertension with goal blood pressure less than 140/90     Idiopathic chronic gout without tophus, unspecified site     Coronary artery disease involving native coronary artery of native heart without angina pectoris     COPD (chronic obstructive pulmonary disease) (H)       Current Medications -   Current Outpatient Medications:      ASPIRIN 81 MG PO TABS, 1 TABLET DAILY, Disp: , Rfl:      atorvastatin (LIPITOR) 40 MG tablet, Take 1 tablet (40 mg) by mouth daily, Disp: 90 tablet, Rfl: 3     carvedilol (COREG) 12.5 MG tablet, Take 1 tablet (12.5 mg) by mouth 2 times daily (with meals), Disp: 180 tablet, Rfl: 1     lisinopril (ZESTRIL) 40 MG tablet, Take 1 tablet (40 mg) by mouth daily, Disp: 90 tablet, Rfl: 1     MULTI-VITAMIN PO TABS, 1 TABLET DAILY, Disp: , Rfl:      nitroGLYcerin (NITROSTAT) 0.4 MG sublingual tablet, Place 1 tablet (0.4 mg) under the tongue every 5 minutes as needed, Disp: 25 tablet, Rfl: 1     spironolactone (ALDACTONE) 50 MG tablet, Take 1 tablet (50 mg) by mouth 2 times daily, Disp: 180 tablet, Rfl: 1    Allergies - No Known Allergies    Social History -   Social History     Socioeconomic History     Marital status:       Spouse name: None     Number of children: 2     Years of education: None     Highest education level: None   Occupational History     Employer: [employer]   Social Needs     Financial resource strain: None     Food insecurity     Worry: None     Inability: None     Transportation needs     Medical: None     Non-medical: None   Tobacco Use     Smoking status: Former Smoker     Packs/day: 1.00     Types: Cigarettes     Start date: 1959     Quit date: 1974     Years since quittin.4     Smokeless tobacco: Never Used   Substance and Sexual Activity     Alcohol use: Yes     Comment: 2 glasses of wine per day      Drug use: No     Sexual activity: Never     Partners: Female   Lifestyle     Physical activity     Days per week: None     Minutes per session: None     Stress: None   Relationships     Social connections     Talks on phone: None     Gets together: None     Attends Mormonism service: None     Active member of club or organization: None     Attends meetings of clubs or organizations: None     Relationship status: None     Intimate partner violence     Fear of current or ex partner: None     Emotionally abused: None     Physically abused: None     Forced sexual activity: None   Other Topics Concern     Parent/sibling w/ CABG, MI or angioplasty before 65F 55M? No   Social History Narrative     None       Family History -   Family History   Problem Relation Age of Onset     Hypertension Mother      Breast Cancer Mother      Cancer - colorectal Father      Prostate Cancer Father      Cancer Brother         lymphoma     Hypertension Sister      Neurologic Disorder Maternal Grandmother      Cancer Paternal Grandfather        Review of Systems - As per HPI and PMHx, otherwise 7 system review of the head and neck is negative.    Physical Exam  /74   Pulse 75   Resp 16   SpO2 97%   General - The patient is nontoxic.  Alert and oriented to person and place, answers questions and cooperates with  examination appropriately.   Voice and Breathing - The patient was breathing comfortably without the use of accessory muscles. There was no wheezing, stridor, or stertor.  The patients voice was hoarse and raspy, somewhat weak.  Eyes - Extraocular movements intact.  Sclera were not icteric or injected, conjunctiva were pink and moist.  Mouth - Examination of the oral cavity showed pink, healthy oral mucosa. No lesions or ulcerations noted.  The tongue was mobile and midline, and the dentition were in good condition.    Throat - The walls of the oropharynx were smooth, pink, moist, symmetric, and had no lesions or ulcerations.  The tonsillar pillars and soft palate were symmetric.  The uvula was midline on elevation.  Nose - External contour is symmetric, no gross deflection or scars.  Nasal mucosa is pink and moist with no abnormal mucus.  The septum was midline and non-obstructive, turbinates of normal size and position.  No polyps, masses, or purulence noted on examination.  Neck -  Soft, non-tender. Palpation of the occipital, submental, submandibular, internal jugular chain, and supraclavicular nodes did not demonstrate any abnormal lymph nodes or masses. Parotids without masses. Palpation of the thyroid was soft and smooth, with no nodules or goiter appreciated.  The trachea was mobile and midline. No pain of the anterior neck.  Neurologic - CN II-XII are grossly intact. No focal neurologic deficits.   Cardiovascular - carotid pulses are 2+ bilaterally, regular rhythm      Procedure: Flexible Endoscopy  Indication: Hoarseness    To best visualize the upper airway anatomy and due to the chief complaint and HPI, I proceeded with a fiberoptic examination.  First I sprayed the right side of the nose with a mixture of lidocaine and neosynephrine.  I then passed the scope through the nasal cavity.  The nasal cavity was unremarkable.  The nasopharynx was mucosally covered and symmetric.  The Eustachian tube openings  were unobstructed.  Going further down I had a clear view of the base of tongue which had normal appearing lingual tonsillar tissue.  The base of tongue was free of lesions, and the vallecula was open.  The epiglottis was smooth and mucosally covered.  The supraglottic larynx was then clearly visualized. It was normal. The vocal cords moved with full abduction and adduction. However, he had vocal cord bowing and presbylarynges. The cords were white and no lesions were seen.  The pyriform sinuses were open, and the limited view of the postcricoid region did not show any lesions.      A/P - Samy Henry is a 81 year old male with hoarseness but no evidence of infection, inflammation, or neoplasm on exam to explain the symptoms. He has presbylarynges. I offered voice therapy or vocal cord injection. He prefers to do nothing. He was happy with knowing this was not anything serious. Return prn.     Dr. Bacilio Case  Otolaryngology  [facility]

## 2021-06-14 ENCOUNTER — MYC MEDICAL ADVICE (OUTPATIENT)
Dept: FAMILY MEDICINE | Facility: CLINIC | Age: 81
End: 2021-06-14

## 2021-07-05 ENCOUNTER — PRE VISIT (OUTPATIENT)
Dept: OTOLARYNGOLOGY | Facility: CLINIC | Age: 81
End: 2021-07-05

## 2021-08-20 ENCOUNTER — LAB (OUTPATIENT)
Dept: LAB | Facility: CLINIC | Age: 81
End: 2021-08-20
Payer: COMMERCIAL

## 2021-08-20 DIAGNOSIS — M1A.00X0 IDIOPATHIC CHRONIC GOUT WITHOUT TOPHUS, UNSPECIFIED SITE: ICD-10-CM

## 2021-08-20 DIAGNOSIS — E78.5 HYPERLIPIDEMIA LDL GOAL <100: ICD-10-CM

## 2021-08-20 DIAGNOSIS — I10 ESSENTIAL HYPERTENSION WITH GOAL BLOOD PRESSURE LESS THAN 140/90: ICD-10-CM

## 2021-08-20 LAB
ALT SERPL W P-5'-P-CCNC: 43 U/L (ref 0–70)
ANION GAP SERPL CALCULATED.3IONS-SCNC: 5 MMOL/L (ref 3–14)
BUN SERPL-MCNC: 16 MG/DL (ref 7–30)
CALCIUM SERPL-MCNC: 9.1 MG/DL (ref 8.5–10.1)
CHLORIDE BLD-SCNC: 107 MMOL/L (ref 94–109)
CHOLEST SERPL-MCNC: 160 MG/DL
CO2 SERPL-SCNC: 27 MMOL/L (ref 20–32)
CREAT SERPL-MCNC: 0.98 MG/DL (ref 0.66–1.25)
ERYTHROCYTE [DISTWIDTH] IN BLOOD BY AUTOMATED COUNT: 13.8 % (ref 10–15)
FASTING STATUS PATIENT QL REPORTED: YES
GFR SERPL CREATININE-BSD FRML MDRD: 73 ML/MIN/1.73M2
GLUCOSE BLD-MCNC: 94 MG/DL (ref 70–99)
HCT VFR BLD AUTO: 42.6 % (ref 40–53)
HDLC SERPL-MCNC: 56 MG/DL
HGB BLD-MCNC: 14.4 G/DL (ref 13.3–17.7)
LDLC SERPL CALC-MCNC: 86 MG/DL
MCH RBC QN AUTO: 34 PG (ref 26.5–33)
MCHC RBC AUTO-ENTMCNC: 33.8 G/DL (ref 31.5–36.5)
MCV RBC AUTO: 101 FL (ref 78–100)
NONHDLC SERPL-MCNC: 104 MG/DL
PLATELET # BLD AUTO: 286 10E3/UL (ref 150–450)
POTASSIUM BLD-SCNC: 4.1 MMOL/L (ref 3.4–5.3)
RBC # BLD AUTO: 4.24 10E6/UL (ref 4.4–5.9)
SODIUM SERPL-SCNC: 139 MMOL/L (ref 133–144)
TRIGL SERPL-MCNC: 90 MG/DL
URATE SERPL-MCNC: 7.4 MG/DL (ref 3.5–7.2)
WBC # BLD AUTO: 7.1 10E3/UL (ref 4–11)

## 2021-08-20 PROCEDURE — 80061 LIPID PANEL: CPT

## 2021-08-20 PROCEDURE — 85027 COMPLETE CBC AUTOMATED: CPT

## 2021-08-20 PROCEDURE — 84550 ASSAY OF BLOOD/URIC ACID: CPT

## 2021-08-20 PROCEDURE — 36415 COLL VENOUS BLD VENIPUNCTURE: CPT

## 2021-08-20 PROCEDURE — 84460 ALANINE AMINO (ALT) (SGPT): CPT

## 2021-08-20 PROCEDURE — 80048 BASIC METABOLIC PNL TOTAL CA: CPT

## 2021-08-20 ASSESSMENT — ENCOUNTER SYMPTOMS
SORE THROAT: 0
FEVER: 0
SHORTNESS OF BREATH: 0
PALPITATIONS: 0
PARESTHESIAS: 0
ABDOMINAL PAIN: 0
ARTHRALGIAS: 0
CHILLS: 0
JOINT SWELLING: 0
HEARTBURN: 0
DIARRHEA: 0
NAUSEA: 0
WEAKNESS: 0
MYALGIAS: 0
DIZZINESS: 0
HEMATOCHEZIA: 0
NERVOUS/ANXIOUS: 0
HEMATURIA: 0
HEADACHES: 0
EYE PAIN: 0
COUGH: 0
FREQUENCY: 0
CONSTIPATION: 0
DYSURIA: 0

## 2021-08-20 ASSESSMENT — ACTIVITIES OF DAILY LIVING (ADL): CURRENT_FUNCTION: NO ASSISTANCE NEEDED

## 2021-08-23 ENCOUNTER — OFFICE VISIT (OUTPATIENT)
Dept: FAMILY MEDICINE | Facility: CLINIC | Age: 81
End: 2021-08-23
Payer: COMMERCIAL

## 2021-08-23 VITALS
SYSTOLIC BLOOD PRESSURE: 138 MMHG | BODY MASS INDEX: 26.63 KG/M2 | HEART RATE: 66 BPM | HEIGHT: 70 IN | OXYGEN SATURATION: 99 % | TEMPERATURE: 97.9 F | WEIGHT: 186 LBS | DIASTOLIC BLOOD PRESSURE: 76 MMHG

## 2021-08-23 DIAGNOSIS — M1A.00X0 IDIOPATHIC CHRONIC GOUT WITHOUT TOPHUS, UNSPECIFIED SITE: ICD-10-CM

## 2021-08-23 DIAGNOSIS — E78.5 HYPERLIPIDEMIA LDL GOAL <100: ICD-10-CM

## 2021-08-23 DIAGNOSIS — I25.10 CORONARY ARTERY DISEASE INVOLVING NATIVE CORONARY ARTERY OF NATIVE HEART WITHOUT ANGINA PECTORIS: ICD-10-CM

## 2021-08-23 DIAGNOSIS — I10 ESSENTIAL HYPERTENSION WITH GOAL BLOOD PRESSURE LESS THAN 140/90: ICD-10-CM

## 2021-08-23 DIAGNOSIS — Z00.00 ENCOUNTER FOR MEDICARE ANNUAL WELLNESS EXAM: Primary | ICD-10-CM

## 2021-08-23 DIAGNOSIS — J44.9 CHRONIC OBSTRUCTIVE PULMONARY DISEASE, UNSPECIFIED COPD TYPE (H): ICD-10-CM

## 2021-08-23 PROCEDURE — 99213 OFFICE O/P EST LOW 20 MIN: CPT | Mod: 25 | Performed by: FAMILY MEDICINE

## 2021-08-23 PROCEDURE — 99397 PER PM REEVAL EST PAT 65+ YR: CPT | Performed by: FAMILY MEDICINE

## 2021-08-23 RX ORDER — CARVEDILOL 12.5 MG/1
6.25 TABLET ORAL 2 TIMES DAILY WITH MEALS
Qty: 180 TABLET | Refills: 0 | COMMUNITY
Start: 2021-08-23 | End: 2021-08-23 | Stop reason: DRUGHIGH

## 2021-08-23 RX ORDER — SPIRONOLACTONE 50 MG/1
TABLET, FILM COATED ORAL
Qty: 180 TABLET | Refills: 0 | Status: CANCELLED | OUTPATIENT
Start: 2021-08-23

## 2021-08-23 RX ORDER — SPIRONOLACTONE 25 MG/1
25 TABLET ORAL DAILY
Qty: 90 TABLET | Refills: 3 | Status: SHIPPED | OUTPATIENT
Start: 2021-08-23 | End: 2022-08-24

## 2021-08-23 RX ORDER — LISINOPRIL 40 MG/1
40 TABLET ORAL DAILY
Qty: 90 TABLET | Refills: 3 | Status: SHIPPED | OUTPATIENT
Start: 2021-08-23 | End: 2022-08-24

## 2021-08-23 RX ORDER — CARVEDILOL 12.5 MG/1
6.25 TABLET ORAL 2 TIMES DAILY WITH MEALS
Qty: 180 TABLET | Refills: 0 | Status: CANCELLED | OUTPATIENT
Start: 2021-08-23

## 2021-08-23 RX ORDER — ATORVASTATIN CALCIUM 40 MG/1
40 TABLET, FILM COATED ORAL DAILY
Qty: 90 TABLET | Refills: 3 | Status: SHIPPED | OUTPATIENT
Start: 2021-08-23 | End: 2022-08-24

## 2021-08-23 RX ORDER — CARVEDILOL 3.12 MG/1
3.12 TABLET ORAL 2 TIMES DAILY WITH MEALS
Qty: 180 TABLET | Refills: 3 | Status: SHIPPED | OUTPATIENT
Start: 2021-08-23 | End: 2022-08-24

## 2021-08-23 RX ORDER — SPIRONOLACTONE 50 MG/1
TABLET, FILM COATED ORAL
Qty: 180 TABLET | Refills: 0 | COMMUNITY
Start: 2021-08-23 | End: 2021-08-23 | Stop reason: DRUGHIGH

## 2021-08-23 ASSESSMENT — ENCOUNTER SYMPTOMS
DIZZINESS: 0
HEADACHES: 0
HEMATURIA: 0
CONSTIPATION: 0
WEAKNESS: 0
FEVER: 0
ARTHRALGIAS: 0
FREQUENCY: 0
NERVOUS/ANXIOUS: 0
DYSURIA: 0
HEARTBURN: 0
CHILLS: 0
DIARRHEA: 0
EYE PAIN: 0
PALPITATIONS: 0
ABDOMINAL PAIN: 0
NAUSEA: 0
COUGH: 0
MYALGIAS: 0
JOINT SWELLING: 0
SHORTNESS OF BREATH: 0
PARESTHESIAS: 0
SORE THROAT: 0
HEMATOCHEZIA: 0

## 2021-08-23 ASSESSMENT — MIFFLIN-ST. JEOR: SCORE: 1555.02

## 2021-08-23 ASSESSMENT — ACTIVITIES OF DAILY LIVING (ADL): CURRENT_FUNCTION: NO ASSISTANCE NEEDED

## 2021-08-23 NOTE — PATIENT INSTRUCTIONS
Patient Education   Personalized Prevention Plan  You are due for the preventive services outlined below.  Your care team is available to assist you in scheduling these services.  If you have already completed any of these items, please share that information with your care team to update in your medical record.  Health Maintenance Due   Topic Date Due     COPD Action Plan  Never done     Zoster (Shingles) Vaccine (2 of 3) 07/03/2009     FALL RISK ASSESSMENT  08/21/2021     Annual Wellness Visit  08/21/2021

## 2021-08-23 NOTE — PROGRESS NOTES
"SUBJECTIVE:   Samy Henry is a 81 year old male who presents for a Preventive Visit and follow-up on baseline health conditions.      Patient has been advised of split billing requirements and indicates understanding: Yes   Are you in the first 12 months of your Medicare coverage?  No    Healthy Habits:     In general, how would you rate your overall health?  Excellent    Frequency of exercise:  2-3 days/week    Duration of exercise:  30-45 minutes    Do you usually eat at least 4 servings of fruit and vegetables a day, include whole grains    & fiber and avoid regularly eating high fat or \"junk\" foods?  Yes    Taking medications regularly:  Yes    Medication side effects:  None, No muscle aches and No significant flushing    Ability to successfully perform activities of daily living:  No assistance needed    Home Safety:  No safety concerns identified    Hearing Impairment:  No hearing concerns    In the past 6 months, have you been bothered by leaking of urine?  No    In general, how would you rate your overall mental or emotional health?  Excellent      PHQ-2 Total Score: 0    Additional concerns today:  No    Do you feel safe in your environment? Yes    Have you ever done Advance Care Planning? (For example, a Health Directive, POLST, or a discussion with a medical provider or your loved ones about your wishes): No, advance care planning information given to patient to review.  Patient declined advance care planning discussion at this time.       Fall risk  Fallen 2 or more times in the past year?: No  Any fall with injury in the past year?: No    Cognitive Screening   1) Repeat 3 items (Leader, Season, Table)    2) Clock draw: NORMAL  3) 3 item recall: Recalls 3 objects  Results: 3 items recalled: COGNITIVE IMPAIRMENT LESS LIKELY    Mini-CogTM Copyright JUAN MANUEL Arguello. Licensed by the author for use in [place] [employer]; reprinted with permission (brad@.[facility]). All rights reserved.      Do you have sleep " apnea, excessive snoring or daytime drowsiness?: no    Reviewed and updated as needed this visit by clinical staff  Tobacco  Allergies  Meds   Med Hx  Surg Hx  Fam Hx  Soc Hx        Reviewed and updated as needed this visit by Provider                Social History     Tobacco Use     Smoking status: Former Smoker     Packs/day: 1.00     Types: Cigarettes     Start date: 1959     Quit date: 1974     Years since quittin.6     Smokeless tobacco: Never Used   Substance Use Topics     Alcohol use: Yes     Comment: 2 glasses of wine per day          Alcohol Use 2021   Prescreen: >3 drinks/day or >7 drinks/week? No   Prescreen: >3 drinks/day or >7 drinks/week? -     He further cut down his blood pressure medication to where he has recently been taking lisinopril 40 mg daily, spironolactone 50 mg 1/2 tablet each morning, and carvedilol 6.25 mg 1/2 tablet each morning.  His blood pressure readings have generally been in the 110-130 systolic range on that dosing and he has generally been feeling well.  He is not having any unusual chest pain.  He feels like his breathing has gotten better from what it used to be.  He is not having any gout attacks.  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  He came in recently for fasting lab work.      Current providers sharing in care for this patient include:   Patient Care Team:  Cabrera Bradshaw MD as PCP - General  Cabrera Bradshaw MD as Referring Physician (Family Medicine)  Michele Guillen MD as MD (Internal Medicine)  Michele Guillen MD as Assigned Pulmonology Provider  Cabrera Bradshaw MD as Assigned PCP  Roro Colon MD as MD (Otolaryngology)  Bacilio Case MD as Assigned Surgical Provider    The following health maintenance items are reviewed in Epic and correct as of today:  Health Maintenance Due   Topic Date Due     COPD ACTION PLAN  Never done     ZOSTER IMMUNIZATION (2 of 3)  2009     FALL RISK ASSESSMENT  2021     Patient Active Problem List   Diagnosis     Colorectal polyps     Advanced directives, counseling/discussion     Hyperlipidemia LDL goal <100     History of non-ST elevation myocardial infarction (NSTEMI)     FHx: prostate cancer     Essential hypertension with goal blood pressure less than 140/90     Idiopathic chronic gout without tophus, unspecified site     Coronary artery disease involving native coronary artery of native heart without angina pectoris     COPD (chronic obstructive pulmonary disease) (H)     Past Surgical History:   Procedure Laterality Date     CATARACT IOL, RT/LT  2009    right     CATARACT IOL, RT/LT      left     COLONOSCOPY       COLONOSCOPY N/A 2015    Procedure: COLONOSCOPY;  Surgeon: Francisco Horowitz MD;  Location: [facility] OR     COLONOSCOPY  2020     COLONOSCOPY WITH CO2 INSUFFLATION N/A 2015    Procedure: COLONOSCOPY WITH CO2 INSUFFLATION;  Surgeon: Francisco Horowitz MD;  Location: [facility] OR     removal of rectal polyp  08/10     STENT, CORONARY, SAMANTA  10/13    3 coronary stents s/p NSTEMI     TONSILLECTOMY & ADENOIDECTOMY       VASECTOMY  79       Social History     Tobacco Use     Smoking status: Former Smoker     Packs/day: 1.00     Types: Cigarettes     Start date: 1959     Quit date: 1974     Years since quittin.6     Smokeless tobacco: Never Used   Substance Use Topics     Alcohol use: Yes     Comment: 2 glasses of wine per day      Family History   Problem Relation Age of Onset     Hypertension Mother      Breast Cancer Mother      Cancer - colorectal Father      Prostate Cancer Father      Cancer Brother         lymphoma     Hypertension Sister      Neurologic Disorder Maternal Grandmother      Cancer Paternal Grandfather          Current Outpatient Medications   Medication Sig Dispense Refill     ASPIRIN 81 MG PO TABS 1 TABLET DAILY       atorvastatin (LIPITOR) 40 MG tablet  "Take 1 tablet (40 mg) by mouth daily 90 tablet 3     carvedilol (COREG) 6.25 MG tablet Take 1 tablet (3.125 mg) by mouth daily 180 tablet 3     lisinopril (ZESTRIL) 40 MG tablet Take 1 tablet (40 mg) by mouth daily 90 tablet 3     MULTI-VITAMIN PO TABS 1 TABLET DAILY       nitroGLYcerin (NITROSTAT) 0.4 MG sublingual tablet Place 1 tablet (0.4 mg) under the tongue every 5 minutes as needed 25 tablet 1     spironolactone (ALDACTONE) 25 MG tablet Take 1 tablet (25 mg) by mouth daily 90 tablet 3     No Known Allergies          Review of Systems   Constitutional: Negative for chills and fever.   HENT: Negative for congestion, ear pain, hearing loss and sore throat.    Eyes: Negative for pain and visual disturbance.   Respiratory: Negative for cough and shortness of breath.    Cardiovascular: Negative for chest pain, palpitations and peripheral edema.   Gastrointestinal: Negative for abdominal pain, constipation, diarrhea, heartburn, hematochezia and nausea.   Genitourinary: Positive for impotence. Negative for discharge, dysuria, frequency, genital sores, hematuria and urgency.   Musculoskeletal: Negative for arthralgias, joint swelling and myalgias.   Skin: Negative for rash.   Neurological: Negative for dizziness, weakness, headaches and paresthesias.   Psychiatric/Behavioral: Negative for mood changes. The patient is not nervous/anxious.          OBJECTIVE:   /76   Pulse 66   Temp 97.9  F (36.6  C) (Oral)   Ht 1.77 m (5' 9.69\")   Wt 84.4 kg (186 lb)   SpO2 99%   BMI 26.93 kg/m   Estimated body mass index is 26.93 kg/m  as calculated from the following:    Height as of this encounter: 1.77 m (5' 9.69\").    Weight as of this encounter: 84.4 kg (186 lb).  Physical Exam  GENERAL: healthy, alert and no distress  EYES: Eyes grossly normal to inspection, PERRL and conjunctivae and sclerae normal  HENT: ear canals and TM's normal, nose and mouth without ulcers or lesions  NECK: no adenopathy, no asymmetry, masses, " or scars and thyroid normal to palpation  RESP: lungs clear to auscultation - no rales, rhonchi or wheezes  CV: regular rate and rhythm, normal S1 S2, no S3 or S4, no murmur, click or rub, no peripheral edema and peripheral pulses strong  ABDOMEN: soft, nontender, no hepatosplenomegaly, no masses and bowel sounds normal  MS: no gross musculoskeletal defects noted, no edema  SKIN: no suspicious lesions or rashes  NEURO: Normal strength and tone, mentation intact and speech normal  PSYCH: mentation appears normal, affect normal/bright    Diagnostic Test Results:  Labs reviewed in Epic  Lab on 08/20/2021   Component Date Value Ref Range Status     Sodium 08/20/2021 139  133 - 144 mmol/L Final     Potassium 08/20/2021 4.1  3.4 - 5.3 mmol/L Final     Chloride 08/20/2021 107  94 - 109 mmol/L Final     Carbon Dioxide (CO2) 08/20/2021 27  20 - 32 mmol/L Final     Anion Gap 08/20/2021 5  3 - 14 mmol/L Final     Urea Nitrogen 08/20/2021 16  7 - 30 mg/dL Final     Creatinine 08/20/2021 0.98  0.66 - 1.25 mg/dL Final     Calcium 08/20/2021 9.1  8.5 - 10.1 mg/dL Final     Glucose 08/20/2021 94  70 - 99 mg/dL Final     GFR Estimate 08/20/2021 73  >60 mL/min/1.73m2 Final    As of July 11, 2021, eGFR is calculated by the CKD-EPI creatinine equation, without race adjustment. eGFR can be influenced by muscle mass, exercise, and diet. The reported eGFR is an estimation only and is only applicable if the renal function is stable.     Cholesterol 08/20/2021 160  <200 mg/dL Final    Age 0-19 years  Desirable: <170 mg/dL  Borderline high:  170-199 mg/dl  High:            >199 mg/dl    Age 20 years and older  Desirable: <200 mg/dL     Triglycerides 08/20/2021 90  <150 mg/dL Final    0-9 years:  Normal:    Less than 75 mg/dL  Borderline high:  75-99 mg/dL  High:             Greater than or equal to 100 mg/dL    0-19 years:  Normal:    Less than 90 mg/dL  Borderline high:   mg/dL  High:             Greater than or equal to 130  mg/dL    20 years and older:  Normal:    Less than 150 mg/dL  Borderline high:  150-199 mg/dL  High:             200-499 mg/dL  Very high:   Greater than or equal to 500 mg/dL     Direct Measure HDL 08/20/2021 56  >=40 mg/dL Final    0-19 years:       Greater than or equal to 45 mg/dL   Low: Less than 40 mg/dL   Borderline low: 40-44 mg/dL     20 years and older:   Female: Greater than or equal to 50 mg/dL   Male:   Greater than or equal to 40 mg/dL          LDL Cholesterol Calculated 08/20/2021 86  <=100 mg/dL Final    Age 0-19 years:  Desirable: 0-110 mg/dL   Borderline high: 110-129 mg/dL   High: >= 130 mg/dL    Age 20 years and older:  Desirable: <100mg/dL  Above desirable: 100-129 mg/dL   Borderline high: 130-159 mg/dL   High: 160-189 mg/dL   Very high: >= 190 mg/dL     Non HDL Cholesterol 08/20/2021 104  <130 mg/dL Final    0-19 years:  Desirable:          Less than 120 mg/dL  Borderline high:   120-144 mg/dL  High:                   Greater than or equal to 145 mg/dL    20 years and older:  Desirable:          130 mg/dL  Above Desirable: 130-159 mg/dL  Borderline high:   160-189 mg/dL  High:               190-219 mg/dL  Very high:     Greater than or equal to 220 mg/dL     Patient Fasting > 8hrs? 08/20/2021 Yes   Final     ALT 08/20/2021 43  0 - 70 U/L Final     WBC Count 08/20/2021 7.1  4.0 - 11.0 10e3/uL Final     RBC Count 08/20/2021 4.24* 4.40 - 5.90 10e6/uL Final     Hemoglobin 08/20/2021 14.4  13.3 - 17.7 g/dL Final     Hematocrit 08/20/2021 42.6  40.0 - 53.0 % Final     MCV 08/20/2021 101* 78 - 100 fL Final     MCH 08/20/2021 34.0* 26.5 - 33.0 pg Final     MCHC 08/20/2021 33.8  31.5 - 36.5 g/dL Final     RDW 08/20/2021 13.8  10.0 - 15.0 % Final     Platelet Count 08/20/2021 286  150 - 450 10e3/uL Final     Uric Acid 08/20/2021 7.4* 3.5 - 7.2 mg/dL Final         ASSESSMENT / PLAN:       ICD-10-CM    1. Encounter for Medicare annual wellness exam  Z00.00    2. Essential hypertension with goal blood  "pressure less than 140/90  I10 lisinopril (ZESTRIL) 40 MG tablet     spironolactone (ALDACTONE) 25 MG tablet     carvedilol (COREG) 3.125 MG tablet     DISCONTINUED: carvedilol (COREG) 12.5 MG tablet     DISCONTINUED: spironolactone (ALDACTONE) 50 MG tablet   3. Hyperlipidemia LDL goal <100  E78.5 atorvastatin (LIPITOR) 40 MG tablet   4. Chronic obstructive pulmonary disease, unspecified COPD type (H)  J44.9    5. Coronary artery disease involving native coronary artery of native heart without angina pectoris  I25.10    6. 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    Weight as of this encounter: 84.4 kg (186 lb).        He reports that he quit smoking about 47 years ago. His smoking use included cigarettes. He started smoking about 62 years ago. He smoked 1.00 pack per day. He has never used smokeless tobacco.      Appropriate preventive services were discussed with this patient, including applicable screening as appropriate for cardiovascular disease, diabetes, osteopenia/osteoporosis, and glaucoma.  As appropriate for age/gender, discussed screening for colorectal cancer, prostate cancer, breast cancer, and cervical cancer. Checklist " reviewing preventive services available has been given to the patient.    Reviewed patients plan of care and provided an AVS. The Basic Care Plan (routine screening as documented in Health Maintenance) for Samy meets the Care Plan requirement. This Care Plan has been established and reviewed with the Patient.    Counseling Resources:  ATP IV Guidelines  Pooled Cohorts Equation Calculator  Breast Cancer Risk Calculator  Breast Cancer: Medication to Reduce Risk  FRAX Risk Assessment  ICSI Preventive Guidelines  Dietary Guidelines for Americans, 2010  USDA's MyPlate  ASA Prophylaxis  Lung CA Screening    Cabrera Bradshaw MD  [facility]    Identified Health Risks:

## 2021-09-26 ENCOUNTER — HEALTH MAINTENANCE LETTER (OUTPATIENT)
Age: 81
End: 2021-09-26

## 2022-03-30 ENCOUNTER — VIRTUAL VISIT (OUTPATIENT)
Dept: FAMILY MEDICINE | Facility: CLINIC | Age: 82
End: 2022-03-30
Payer: COMMERCIAL

## 2022-03-30 DIAGNOSIS — I10 ESSENTIAL HYPERTENSION WITH GOAL BLOOD PRESSURE LESS THAN 140/90: Primary | ICD-10-CM

## 2022-03-30 DIAGNOSIS — R06.09 DYSPNEA ON EXERTION: ICD-10-CM

## 2022-03-30 PROCEDURE — 99213 OFFICE O/P EST LOW 20 MIN: CPT | Mod: 95 | Performed by: FAMILY MEDICINE

## 2022-03-30 NOTE — PROGRESS NOTES
"Dominic is a 82 year old who is being evaluated via a billable video visit.      How would you like to obtain your AVS? MyChart  If the video visit is dropped, the invitation should be resent by: Text to cell phone: [phone]  Will anyone else be joining your video visit? No    Video Start Time: 5:35 PM    Assessment & Plan       ICD-10-CM    1. Essential hypertension with goal blood pressure less than 140/90  I10    2. Dyspnea on exertion  R06.00      We discussed various options including increasing the spironolactone dosing to 50 mg once a day or 25 mg twice a day, but we elected to sit tight with his current meds for now  He will try to lose about 10 pounds between now and his physical this summer  He will remain as physically active as possible/comfortable  We will plan a recheck in August with an annual physical     BMI:   Estimated body mass index is 26.93 kg/m  as calculated from the following:    Height as of 8/23/21: 1.77 m (5' 9.69\").    Weight as of 8/23/21: 84.4 kg (186 lb).       Return in about 21 weeks (around 8/24/2022) for Physical Exam, Lab Work, Routine Visit.    Cabrera Bradshaw MD  [facility]    Subjective   Dominic is a 82 year old who presents for the following health issues     History of Present Illness       Reason for visit:  Breathing problems during stress. Climbing stairs, moving heavier loads any distance. Breathing returns to normal after a brief rest period.  Symptom onset:  More than a month  Symptom intensity:  Moderate  Symptom progression:  Staying the same  Had these symptoms before:  Yes  Has tried/received treatment for these symptoms:  Yes  Previous treatment was successful:  No    He eats 2-3 servings of fruits and vegetables daily.He consumes 1 sweetened beverage(s) daily.He exercises with enough effort to increase his heart rate 20 to 29 minutes per day.  He exercises with enough effort to increase his heart rate 3 or less days per week.   He is taking " medications regularly.       Patient is following up on SOB on going for years.    He has had shortness of breath with activity for a number of months.  He does not feel short of breath at rest.  He can walk a mile and a half and do other moderate levels of activity without difficulty, but if he does a short burst of exercise such as climbing 2 flights of stairs or something comparable, then he will feel short of breath.  He is currently on lisinopril, spironolactone, and low-dose carvedilol for his hypertension.  We have tried a number of different agents including metoprolol, HCTZ, hydralazine, amlodipine, and clonidine which had caused a variety of side effects.  This current regimen has generally works fairly well for him  His morning blood pressure readings are usually around 135/78.  His systolic readings can drift up towards 150 later in the day.  He would rather not add or change his medications at this time.    Patient Active Problem List   Diagnosis     Colorectal polyps     Advanced directives, counseling/discussion     Hyperlipidemia LDL goal <100     History of non-ST elevation myocardial infarction (NSTEMI)     FHx: prostate cancer     Essential hypertension with goal blood pressure less than 140/90     Idiopathic chronic gout without tophus, unspecified site     Coronary artery disease involving native coronary artery of native heart without angina pectoris     COPD (chronic obstructive pulmonary disease) (H)     Current Outpatient Medications   Medication     ASPIRIN 81 MG PO TABS     atorvastatin (LIPITOR) 40 MG tablet     carvedilol (COREG) 3.125 MG tablet     lisinopril (ZESTRIL) 40 MG tablet     MULTI-VITAMIN PO TABS     spironolactone (ALDACTONE) 25 MG tablet     nitroGLYcerin (NITROSTAT) 0.4 MG sublingual tablet     No current facility-administered medications for this visit.     He is still flying airplanes periodically, often once a week or so.    Review of Systems   Constitutional, HEENT,  cardiovascular, pulmonary, gi and gu systems are negative, except as otherwise noted.      Objective           Vitals:  No vitals were obtained today due to virtual visit.    Physical Exam   GENERAL: Healthy, alert and no distress  EYES: Eyes grossly normal to inspection.  No discharge or erythema, or obvious scleral/conjunctival abnormalities.  RESP: No audible wheeze, cough, or visible cyanosis.  No visible retractions or increased work of breathing.    SKIN: Visible skin clear. No significant rash, abnormal pigmentation or lesions.  NEURO: Cranial nerves grossly intact.  Mentation and speech appropriate for age.  PSYCH: Mentation appears normal, affect normal/bright, judgement and insight intact, normal speech and appearance well-groomed.    Previous lab results were reviewed.            Video-Visit Details    Type of service:  Video Visit    Video End Time:5:46 PM    Originating Location (pt. Location): Home    Distant Location (provider location):  [facility]     Platform used for Video Visit: [employer]

## 2022-05-17 ENCOUNTER — TELEPHONE (OUTPATIENT)
Dept: CARDIOLOGY | Facility: CLINIC | Age: 82
End: 2022-05-17
Payer: COMMERCIAL

## 2022-05-17 NOTE — TELEPHONE ENCOUNTER
"  Oban Study Pre-Visit Call      Study description:   Multiconditions PPG Study. The purpose of this research study is to collect data related to health for the development of mobile technologies. This data will include physiological signal recordings from medical devices and data collection software on [employer] Watches (\"study watches\"). This study is not to provide any treatment nor assess safety and effectiveness, but rather to collect information for research and  purposes.     Samy Henry a 82 year old male, was called today to discuss participation in the Oban study. The following was reviewed with the patient.       You agree to comply with COVID precautionary measures required by local public health ordinances, workplace health and safety protocols, and/or the Study Team. Such measure may include, for example, wearing a mask, complying with social distancing guidelines, and/ or providing evidence of negative COVID-19 test result NA       You are fully vaccinated per the most recent CDC guidelines NA      You do not have any of the following symptoms: fever or chills; difficulty breathing; sustained loss of taste smell, or appetite. NA      You do not have any of the following unexplained symptoms: fatigue or nausea; whole body muscle aches; new or unexpected headache; sore throat; congestion or runny nose; diarrhea or vomiting NA      You have not had close contact with someone who is suspected or confirmed to have active COVID-19 in the last 14 days.NA    Writer left message with participant educating on COVID-19 precautions and requirements. Left call-back number in the event participant is not able to comply with precautions/requirements.     Reminders    Please come 10 minutes early for your scheduled appointment time.    Bring your vaccination card with you to your scheduled appointment.     No smoking 2 hours prior to your appointment time.    Wear loose shirt.    Eat before you " arrive.       Ana Butt RN

## 2022-05-18 ENCOUNTER — ALLIED HEALTH/NURSE VISIT (OUTPATIENT)
Dept: CARDIOLOGY | Facility: CLINIC | Age: 82
End: 2022-05-18

## 2022-05-18 ENCOUNTER — OFFICE VISIT (OUTPATIENT)
Dept: CARDIOLOGY | Facility: CLINIC | Age: 82
End: 2022-05-18
Payer: COMMERCIAL

## 2022-05-18 VITALS
BODY MASS INDEX: 25.75 KG/M2 | TEMPERATURE: 97.5 F | WEIGHT: 179.9 LBS | HEART RATE: 68 BPM | DIASTOLIC BLOOD PRESSURE: 78 MMHG | HEIGHT: 70 IN | RESPIRATION RATE: 16 BRPM | OXYGEN SATURATION: 96 % | SYSTOLIC BLOOD PRESSURE: 134 MMHG

## 2022-05-18 DIAGNOSIS — J44.9 CHRONIC OBSTRUCTIVE PULMONARY DISEASE, UNSPECIFIED COPD TYPE (H): Primary | ICD-10-CM

## 2022-05-18 DIAGNOSIS — Z00.6 EXAMINATION OF PARTICIPANT OR CONTROL IN CLINICAL RESEARCH: Primary | ICD-10-CM

## 2022-05-18 PROCEDURE — 99207 PR NO CHARGE-RESEARCH SERVICE: CPT

## 2022-05-18 PROCEDURE — 99207 PR NO CHARGE NURSE ONLY: CPT

## 2022-05-18 NOTE — PROGRESS NOTES
"   Oban Study Consent On-Site Visit      Study description:   Multiconditions PPG Study. The purpose of this research study is to collect data related to health for the development of mobile technologies. This data will include physiological signal recordings from medical devices and data collection software on [employer] Watches (\"study watches\"). This study is not to provide any treatment nor assess safety and effectiveness, but rather to collect information for research and  purposes.     Samy Henry a 82 year old male, was onsite today to discuss participation in the Oban study.   The consent form was reviewed with the patient.     The review of the study included:    Study Purpose     COVID-19 Criteria     On-Site Study Participation    Participant Responsibilities      Study Data and Devices    Benefits and Risks of Participation    Compensation and Costs of Participation    Voluntary Participation    Confidentiality     Injury and Legal Rights    The subject was queried in regards to his willingness to continue and his questions were answered to his satisfaction.     The patient has given his agreement to volunteer to participate in the above noted study.     The In-Lab consent form and HIPPA form version 28-Apr-2022 was signed 18-MAY-2022 onsite in the Clinic Research Unit.     A copy of the Oban consent will be placed in subject's medical record.  A copy of the consent form was given to the subject today.    Study data is directly entered into Epic per protocol.     No study procedures were done prior to Samy Henry providing informed consent.       Jesus Carvalho"

## 2022-05-18 NOTE — PROGRESS NOTES
"[facility]ban Study Physical Exam      Medical History Reviewed? Yes    Physical Examination  For abnormal findings, please evaluate if the finding is Clinically Significant (by 'CS') or Not Clinically Significant (by 'NCS')  General Appearance   Normal  Head and Neck   Abnormal; NCS wears glasses; wears top dentures and has bridge on the bottom  Lungs     Normal  Cardiovascular   Normal  Abdomen    Normal  Musculoskeletal/Extremities  Normal   Lymph Nodes    Normal  Skin     Normal  Neurological    Normal    Tremor (If present document)  Absent    Vitals:  /78 (BP Location: Right arm, Patient Position: Chair, Cuff Size: Adult Regular)   Pulse 68   Temp 97.5  F (36.4  C) (Oral)   Resp 16   Ht 1.79 m (5' 10.47\")   Wt 81.6 kg (179 lb 14.3 oz)   SpO2 96%   BMI 25.47 kg/m    BSA 2.01 m          COVID: No symptoms, chills, shortness of breath, or difficulty breathing, muscle or body aches, headache, loss of taste or smell, sore throat, runny nose, congestion, nausea, vomiting or diarrhea according to the [place] Department of Health and Human Services based on the CARES Act.     COVID Vaccinations:   Immunization History   Administered Date(s) Administered     COVID-19,PF,Pfizer (12+ Yrs) 03/08/2021, 03/29/2021, 11/24/2021     Influenza (High Dose) 3 valent vaccine 01/30/2014, 10/02/2017     Influenza, Quad, High Dose, Pf, 65yr+ (Fluzone HD) 10/07/2020, 09/25/2021     Pneumo Conj 13-V (2010&after) 07/14/2015     Pneumococcal 23 valent 04/03/2006     TD (ADULT, 7+) 12/22/1999, 05/08/2009, 07/23/2019     Zoster vaccine, live 05/08/2009       Smoking History  Are you currently smoking or vaping? No  How Many Years Have You Smoked or Vaped? 15 years (1/1/1959-1/1/1974)  Packs or E-Cigs Per Day: 1 PPD     Electrocardiogram   ECG not applicable as subject is in the respiratory cohort.     Respiratory Conditions:   COPD    Spirometer Test Results (FEV%): 40  Condition Severity: Severe - Stage 3 (FEV 30-49%)      Kaylah Lopez, " JOEL

## 2022-05-18 NOTE — PROGRESS NOTES
Riana Inclusion/Exclusion Criteria:     Study Name: Riana  : Robson Oliveira MD    Protocol version: 3.0 19-JAN-2022     Criteria #  Inclusion Criterita (ALL MUST BE YES)  YES/NO/N/A   1   Male and female subjects at least 18 years old at the time of the screening visit.  Yes   2   Wrist circumference and 120mm-245mm (inclusive).  Yes   3   Ability to understand and provide written informed consent.  Yes   4   Willing and able to comply with study procedures, activities, and duration as described in the ICF. Yes   5  Documentation provided demonstrating COVID-19 up to date vaccinated status as per the current CDC guidelines.  Yes   6   Didn't smoke at least 2 hours before screening (or study procedures).  Yes   7   Neither subject, nor any individuals living with subject, have had new development in the following within the last 14 days prior to study screening:        a. Have failed to comply with any country, state, and local travel restrictions.         b. Have had any unexpected flu-like symptoms (such as fever, chills, cough, shortness of breath, diarrhea, sore throat, runny nose, or trouble breathing).        c. Have had any contact with people confirmed COVID-19.         d. Have been confirmed to have COVID-19 and have not subsequently received a negative COVID-19 test result.    Yes   8   If Cohort 1 (In-Lab Cardiac Conditions):         a. Indication of a rhythm disorder (dated up to 5 years ago) as outlined in Table A (see Protocol page 9), and be present at the time of screening.     NA   9   If Cohort 2 (In-Lab Respiratory Conditions):          a. Prior diagnosis of one of the following conditions, within 5 years: 1) Moderate (GOLD Stage 2) COPD, 2) Severe or Very Severe (GOLD Stage 3 or 4) COPD, 3) Idiopathic pulmonary fibrosis.          b. Record of spirometry FEV% result (within 5 years) are available.    Yes   10   If Cohort 3 (At-Home respiratory Conditions):         a. Prior  diagnosis of one of the following conditions, within 5 years: 1) Moderate (GOLD Stage 2) COPD, 2) Severe or Very Severe (GOLD Stage 3 or 4) COPD, 3) Idiopathic pulmonary fibrosis.          b. Record of spirometry FEV% result (within 5 years) are available.          c. Willing and able to use a study provided iPhone and navigate study Ovi flow.          D. Stable WIFI at home and are able to connect it to study iPhones   NA       Criteria # Exclusion Criteria (ALL MUST BE NO) YES/NO/N/A   1   Individuals with severe contact allergies to standard adhesives, or other materials found in pulse oximetry sensors, ECG electrodes, respiration monitor electrodes, wearables device bands and watch surfaces.  No   2   Individuals that do not have at least 2 intact fingers (excluding thumb, *pinky will be excluded only for cohort 1 and cohort 2) on non-preferred hand to wear a watch.  No   3   Open wound(s) or active infections on wrists at study watch wear locations or where the ECG electrodes may be placed.  No   4   Physical disability that prevents safe and adequate testing.  No   5   Individuals with a pacemaker or an automated implantable cardioverter-defibrillator (AICD).  No   6   Individuals with physical scars, tattoos, or other skin markings on wrists where sensors or finger sensor are to be worn.  No   7   Individuals with clinically significant hand tremors, as judged by a Study Investigator.  No   8   Pregnant women.     No   9   Subjects with any medical history, physical exam, vital sign or any other study procedure finding/assessment that in the opinion of the Investigator could compromise subject safety during study participation or interfere with the study integrity and/or the accurate assessment of the study objectives.  No   10   Presence of skin conditions or disease at the fingers of SpO2% application sites that could interfere with SpO2% sensor placement or the accuracy of measurement. Such conditions  include, but are not limited to: extensive scarring, skin lesions, redness, infection or edema at target measurement sites.   No   11   Presence of long fingernails that interfere with the placement of the SpO2% sensor or nail polish at the fingers of SpO2% application sites.  No   12   Medical history or physical assessment finding that makes the subject inappropriate for participation, according to the investigator.  No     Patient does fulfill study inclusion criteria and no exclusion criteria are found.     Robson Oliveira MD    18-MAY-2022    Jesus Carvalho

## 2022-05-18 NOTE — PROGRESS NOTES
"      [facility] Study In-Lab Note      Study description: Multiconditions PPG Study. The purpose of this research study is to collect data related to health for the development of mobile technologies. This data will include physiological signal recordings from medical devices and data collection software on Apple Watches (\"study watches\"). This study is not to provide any treatment nor assess safety and effectiveness, but rather to collect information for research and  purposes.    Subject ID:  [id number]       SCREENING        Samy Henry   1940          82 year old  male    Time Subject Sat: 0830     Past Medical History:   Diagnosis Date     CAD (coronary artery disease) 10/2013    s/p 3 stents -- NL stress test 12/4/20     Colorectal polyps 08/03/2010    anal polyp removed     COPD (chronic obstructive pulmonary disease) (H) 4/2/2021     FHx: prostate cancer     father     Gout 02/2008     Hyperlipidemia LDL goal < 100 04/2006     Hypertension goal BP (blood pressure) < 140/90 05/2002     NSTEMI (non-ST elevation myocardial infarction) (H) 10/2013     Squamous cell carcinoma 05/2008    RT CHEEK       Current Outpatient Medications:      ASPIRIN 81 MG PO TABS, 1 TABLET DAILY, Disp: , Rfl:      atorvastatin (LIPITOR) 40 MG tablet, Take 1 tablet (40 mg) by mouth daily, Disp: 90 tablet, Rfl: 3     carvedilol (COREG) 3.125 MG tablet, Take 1 tablet (3.125 mg) by mouth 2 times daily (with meals), Disp: 180 tablet, Rfl: 3     lisinopril (ZESTRIL) 40 MG tablet, Take 1 tablet (40 mg) by mouth daily, Disp: 90 tablet, Rfl: 3     MULTI-VITAMIN PO TABS, 1 TABLET DAILY, Disp: , Rfl:      nitroGLYcerin (NITROSTAT) 0.4 MG sublingual tablet, Place 1 tablet (0.4 mg) under the tongue every 5 minutes as needed (Patient not taking: Reported on 3/30/2022), Disp: 25 tablet, Rfl: 1     spironolactone (ALDACTONE) 25 MG tablet, Take 1 tablet (25 mg) by mouth daily, Disp: 90 tablet, Rfl: 3    No Known Allergies     Past " "Surgical History:   Procedure Laterality Date     CATARACT IOL, RT/LT  06/2009     CATARACT IOL, RT/LT  08/13     COLONOSCOPY       COLONOSCOPY N/A 8/31/2015     COLONOSCOPY  09/29/2020     COLONOSCOPY WITH CO2 INSUFFLATION N/A 8/31/2015     removal of rectal polyp  08/10     STENT, CORONARY, SAMANTA  10/13     TONSILLECTOMY & ADENOIDECTOMY  1945     VASECTOMY  04/07/78        Child-Bearing Potential?: N/A (Male)    Race: White  Race (Secondary): N/A    : No    Ethnicity: Non-/     Vitals:  /78 (BP Location: Right arm, Patient Position: Chair, Cuff Size: Adult Regular)   Pulse 68   Temp 97.5  F (36.4  C) (Oral)   Resp 16   Ht 1.79 m (5' 10.47\")   Wt 81.6 kg (179 lb 14.3 oz)   SpO2 96%   BMI 25.47 kg/m       Sponsor Expected Values   Blood Pressure: SBP: ; DBP: 40-90  Pulse:  bpm  Temp: 35.5-37.5  C  Respiration: 10-23  Ht: in cm  Wt: in kg  SpO2%: %  BMI: Rounded to nearest whole number    Repeated Measurements: (enter as needed)  n/a     Respiratory Conditions: COPD    Spirometer Test Results (FEV%): 40    Condition Severity: Severe - Stage 3 (FEV 30-49%)      Sleep Conditions:  Sleep Apnea Diagnosis: No  Use of CPAP at Night: No    Oxygen Therapy: No      Minutes of Exercise per Week: >60  Type of Activity: Cardio      Measurements & Preferences:  Dominant Hand: Left  Preferred Watch Hand: Right     Volunteer-Reported Miles Scale: 2  Staff-Recorded Miles Scale: 2    Hairiness Level: B: Thin Hair, High Density     Wrist Circumference:  Left: 186 mm       Right: 178 mm          ECG:  ECG not applicable as this subject is in the Respiratory Cohort.         STUDY PROCEDURE DATA     Spectrometer Values:            Left:   L*: 60.41    A*: 11.54   B*: 16.51      Right: L*: 60.59    A*: 9.27   B*: 15.94                 Environmental Conditions:   Temperature: 23.0  C  Barometric Pressure: 29.84 in (from weather.com)   Humidity: 37 %    Lux Level (Near " Wrist):  Left: 502.5  Right: 666.1  Pre-Procedure Temperatures:  Left Wrist Skin: 29.8  C  Right Wrist Skin: 31.2  C  Finger Nellcor #1: 32.0  C  Finger Nellcor #2: 32.4  C    Study Date: 05/18/22  Study Time (Macbook Picture 1): 09:36:00     Device IDs  Left Watch ID (Size): [id number] (40)   Right Watch ID (Size): [id number] (40)   Band Size  Left: M/L    Right: M/L  Secure Setting Notch  Left: 4   Right: 4  Watch Enclosure: Aluminum    Nox ID: [id number]     Sync Box ID: [id number]     Nellcor #1 ID: [id number]  Nellcor #1 Location: Left Ring Finger    Nellcor #2 ID: [id number]  Nellcor #2 Location: Left Index Finger    Subject Transgressions: (time stamp and identify all extraneous subject movements, coughs, etc)   1021 Arm Movement., right       Time Macbook based Picture 1: 09:36:00  Time Watch Left Based Picture 1: 09:35:59   Time Watch Right Based Picture 1: 09:35:59     Time Nellcor #1 Based Picture 2: 09:54:47   Time Nellcor #2 Based Picture 2: 09:54:47   Time Macbook Based Picture 2: 09:54:47     POST-PROCEDURE      Temperatures:  Left Wrist Skin (post): 30.4  C   Right Wrist Skin (post): 31.7  C  Finger Nellcor #1 (post): 24.0  C Finger Nellcor #2 (post): 27.3  C  Subject Performed Secure-1 Measurement? Yes  Moisturizing Cream/Lotion applied at wrist? No  Apple Watch Band Tightness During In-Lab Study: Snug but comfortable  Additional Comments (Device/participant issues): N/A     18-MAY-2022  Jesus Carvalho

## 2022-05-18 NOTE — PROGRESS NOTES
[facility]iana Study End Note    Study Description:   Multiconditions PPG Sub-Study. The purpose of this research study is to collect data related to health for the development of mobile technologies. This data will include physiological signal recordings from medical devices and smart watch data collection software. This study is not to provide any treatment. This study will only collect information for research and  purposes.     Adverse Events & Con Med Assessment Performed?   [x]     Did the Subject Complete the Study? Yes    If no, Termination Reason: N/A    Study Termination/Completion Date: 18-MAY-2022    Jesus Carvalho

## 2022-06-17 ENCOUNTER — IMMUNIZATION (OUTPATIENT)
Dept: NURSING | Facility: CLINIC | Age: 82
End: 2022-06-17
Payer: COMMERCIAL

## 2022-06-17 DIAGNOSIS — Z23 HIGH PRIORITY FOR 2019-NCOV VACCINE: Primary | ICD-10-CM

## 2022-06-17 PROCEDURE — 91305 COVID-19,PF,PFIZER (12+ YRS): CPT

## 2022-06-17 PROCEDURE — 0054A COVID-19,PF,PFIZER (12+ YRS): CPT

## 2022-06-23 ENCOUNTER — TELEPHONE (OUTPATIENT)
Dept: CARDIOLOGY | Facility: CLINIC | Age: 82
End: 2022-06-23

## 2022-06-23 NOTE — TELEPHONE ENCOUNTER
"  [facility] Study Pre-Visit Call      Study description:   Multiconditions PPG Study. The purpose of this research study is to collect data related to health for the development of mobile technologies. This data will include physiological signal recordings from medical devices and data collection software on Apple Watches (\"study watches\"). This study is not to provide any treatment nor assess safety and effectiveness, but rather to collect information for research and  purposes.     Samy Henry a 82 year old male, was called today to discuss participation in the [facility] study. The following was reviewed with the patient.       You agree to comply with COVID precautionary measures required by local public health ordinances, workplace health and safety protocols, and/or the Study Team. Such measure may include, for example, wearing a mask, complying with social distancing guidelines, and/ or providing evidence of negative COVID-19 test result Yes       You are fully vaccinated per the most recent CDC guidelines Yes      You do not have any of the following symptoms: fever or chills; difficulty breathing; sustained loss of taste smell, or appetite. Yes      You do not have any of the following unexplained symptoms: fatigue or nausea; whole body muscle aches; new or unexpected headache; sore throat; congestion or runny nose; diarrhea or vomiting Yes      You have not had close contact with someone who is suspected or confirmed to have active COVID-19 in the last 14 days.Yes      Reminders    Please come 10 minutes early for your scheduled appointment time.    Bring your vaccination card with you to your scheduled appointment.     No smoking 2 hours prior to your appointment time.    Wear loose shirt.    Eat before you arrive.       Ana Butt RN       "

## 2022-06-24 ENCOUNTER — OFFICE VISIT (OUTPATIENT)
Dept: CARDIOLOGY | Facility: CLINIC | Age: 82
End: 2022-06-24
Payer: COMMERCIAL

## 2022-06-24 ENCOUNTER — ALLIED HEALTH/NURSE VISIT (OUTPATIENT)
Dept: CARDIOLOGY | Facility: CLINIC | Age: 82
End: 2022-06-24
Payer: COMMERCIAL

## 2022-06-24 ENCOUNTER — TELEPHONE (OUTPATIENT)
Dept: CARDIOLOGY | Facility: CLINIC | Age: 82
End: 2022-06-24

## 2022-06-24 VITALS
RESPIRATION RATE: 16 BRPM | WEIGHT: 179.9 LBS | OXYGEN SATURATION: 95 % | SYSTOLIC BLOOD PRESSURE: 113 MMHG | HEART RATE: 69 BPM | HEIGHT: 70 IN | BODY MASS INDEX: 25.75 KG/M2 | DIASTOLIC BLOOD PRESSURE: 70 MMHG

## 2022-06-24 DIAGNOSIS — J44.9 CHRONIC OBSTRUCTIVE PULMONARY DISEASE, UNSPECIFIED COPD TYPE (H): Primary | ICD-10-CM

## 2022-06-24 DIAGNOSIS — Z00.6 EXAMINATION OF PARTICIPANT OR CONTROL IN CLINICAL RESEARCH: Primary | ICD-10-CM

## 2022-06-24 PROCEDURE — 99207 PR NO CHARGE-RESEARCH SERVICE: CPT

## 2022-06-24 PROCEDURE — 99207 PR NO CHARGE NURSE ONLY: CPT

## 2022-06-24 NOTE — PROGRESS NOTES
[facility] Study At-Home Consent      Study description:   Multiconditions PPG Sub-Study. The purpose of this research study is to collect data related to health for the development of mobile technologies. This data will include physiological signal recordings from medical devices and smart watch data collection software. This study is not to provide any treatment. This study will only collect information for research and  purposes.     Samy Henry a 82 year old male, was onsite today to discuss participation in the At-Home portion of the Oban study.   The consent form was reviewed with the patient.     The review of the study included:    Study Purpose     COVID-19 Criteria     At-Home Study Participation    Participant Responsibilities      Study Data and Devices    Benefits and Risks of Participation    Compensation and Costs of Participation    Voluntary Participation    Confidentiality     Injury and Legal Rights    The subject was queried in regards to his willingness to continue and his questions were answered to his satisfaction.     The patient has given his agreement to volunteer to participate in the above noted study.     The At-Home consent form and HIPPA form version 17 Jun 2022 was signed 24-JUN-2022 onsite in the Clinic Research Unit.     A copy of the At-Home Oban consent will be placed in subject's medical record.  A copy of the consent form was given to the subject today.    Study data is directly entered into Epic per protocol.     No study procedures were done prior to Samy Henry providing informed consent.       Radha Powers

## 2022-06-24 NOTE — PROGRESS NOTES
"[facility] Study Physical Exam      Medical History Reviewed? Yes    Physical Examination  For abnormal findings, please evaluate if the finding is Clinically Significant (by 'CS') or Not Clinically Significant (by 'NCS')  General Appearance   Normal  Head and Neck   Abnormal; NCS wears top dentures and has bridge on the bottom  Lungs     Normal  Cardiovascular   Normal  Abdomen    Normal  Musculoskeletal/Extremities  Normal   Lymph Nodes    Normal  Skin     Normal  Neurological    Normal    Tremor (If present document)  Absent    Vitals:  /70 (BP Location: Right arm, Patient Position: Sitting, Cuff Size: Adult Regular)   Pulse 69   Resp 16   Ht 1.79 m (5' 10.47\")   Wt 81.6 kg (179 lb 14.3 oz)   SpO2 95%   BMI 25.47 kg/m    BSA 2.01 m      COVID: No symptoms, chills, shortness of breath, or difficulty breathing, muscle or body aches, headache, loss of taste or smell, sore throat, runny nose, congestion, nausea, vomiting or diarrhea according to the [place] Department of Health and Human Services based on the CARES Act.     COVID Vaccinations:   Immunization History   Administered Date(s) Administered     COVID-19,PF,Pfizer (12+ Yrs) 03/08/2021, 03/29/2021, 11/24/2021     COVID-19,PF,Pfizer 12+ Yrs (2022 and After) 06/17/2022     Influenza (High Dose) 3 valent vaccine 01/30/2014, 10/02/2017     Influenza, Quad, High Dose, Pf, 65yr+ (Fluzone HD) 10/07/2020, 09/25/2021     Pneumo Conj 13-V (2010&after) 07/14/2015     Pneumococcal 23 valent 04/03/2006     TD (ADULT, 7+) 12/22/1999, 05/08/2009, 07/23/2019     Zoster vaccine, live 05/08/2009       Smoking History  Are you currently smoking or vaping? No  How Many Years Have You Smoked or Vaped? 15 years (1/1/1959-1/1/1974)  Packs or E-Cigs Per Day: 1 PPD     Electrocardiogram   ECG not applicable as subject is in the respiratory cohort.     Respiratory Conditions:   COPD    Spirometer Test Results (FEV%): 40  Condition Severity: Severe - Stage 3 (FEV 30-49%)      Kaylah Kate" JOEL Lopez

## 2022-06-24 NOTE — PROGRESS NOTES
[facility]iana Study At-Home Note      Study description: Multiconditions PPG Sub-Study. The purpose of this research study is to collect data related to health for the development of mobile technologies. This data will include physiological signal recordings from medical devices and smart watch data collection software. This study is not to provide any treatment. This study will only collect information for research and  purposes.     Subject ID:  [id number]       SCREENING     Demographic Info  Samy Henry   1940          82 year old  male    Past Medical History:   Diagnosis Date     CAD (coronary artery disease) 10/2013    s/p 3 stents -- NL stress test 12/4/20     Colorectal polyps 08/03/2010    anal polyp removed     COPD (chronic obstructive pulmonary disease) (H) 4/2/2021     FHx: prostate cancer     father     Gout 02/2008     Hyperlipidemia LDL goal < 100 04/2006     Hypertension goal BP (blood pressure) < 140/90 05/2002     NSTEMI (non-ST elevation myocardial infarction) (H) 10/2013     Squamous cell carcinoma 05/2008    RT CHEEK       Current Outpatient Medications:      ASPIRIN 81 MG PO TABS, 1 TABLET DAILY, Disp: , Rfl:      atorvastatin (LIPITOR) 40 MG tablet, Take 1 tablet (40 mg) by mouth daily, Disp: 90 tablet, Rfl: 3     carvedilol (COREG) 3.125 MG tablet, Take 1 tablet (3.125 mg) by mouth 2 times daily (with meals), Disp: 180 tablet, Rfl: 3     lisinopril (ZESTRIL) 40 MG tablet, Take 1 tablet (40 mg) by mouth daily, Disp: 90 tablet, Rfl: 3     MULTI-VITAMIN PO TABS, 1 TABLET DAILY, Disp: , Rfl:      nitroGLYcerin (NITROSTAT) 0.4 MG sublingual tablet, Place 1 tablet (0.4 mg) under the tongue every 5 minutes as needed (Patient not taking: Reported on 3/30/2022), Disp: 25 tablet, Rfl: 1     spironolactone (ALDACTONE) 25 MG tablet, Take 1 tablet (25 mg) by mouth daily, Disp: 90 tablet, Rfl: 3    No Known Allergies     Past Surgical History:   Procedure Laterality Date      "CATARACT IOL, RT/LT  06/2009     CATARACT IOL, RT/LT  08/13     COLONOSCOPY       COLONOSCOPY N/A 8/31/2015     COLONOSCOPY  09/29/2020     COLONOSCOPY WITH CO2 INSUFFLATION N/A 8/31/2015     removal of rectal polyp  08/10     STENT, CORONARY, SAMANTA  10/13     TONSILLECTOMY & ADENOIDECTOMY  1945     VASECTOMY  04/07/78            Child-Bearing Potential?: N/A (Male)    Race: White  Race (Secondary): N/A    : No    Ethnicity: Non-/     Vitals:  Time Subject Sat: 0830   /70 (BP Location: Right arm, Patient Position: Sitting, Cuff Size: Adult Regular)   Pulse 69   Resp 16   Ht 1.79 m (5' 10.47\")   Wt 81.6 kg (179 lb 14.3 oz)   SpO2 95%   BMI 25.47 kg/m       Sponsor Expected Values   Blood Pressure: SBP: ; DBP: 40-90  Pulse:  bpm  Temp: 35.5-37.5  C  Respiration: 10-23  Ht: in cm  Wt: in kg  SpO2%: %  BMI: Rounded to nearest whole number      Screening Info:  Respiratory Conditions: COPD    Spirometer Test Results (FEV%): 40    Condition Severity: Severe - Stage 3 (FEV 30-49%)    Sleep Conditions:  Sleep Apnea Diagnosis: No  Use of CPAP at Night: No  Stop Breathing or Gasping/Gurgling Sounds at Night: No  Snoring at Night: No    SPO2%  ? 95% during 3 minutes? Yes    Oxygen Therapy: No    Minutes of Exercise per Week: >60  Type of Activity: Cardio      Measurements & Preferences:  Dominant Hand: Left  Preferred Watch Hand: Right     Volunteer-Reported Miles Scale: 2  Staff-Recorded Miles Scale: 2    Hairiness Level: B: Thin Hair, High Density     Wrist Circumference:  Left: 186 mm       Right: 178 mm    Spectrometer Values:                                               Left:   L*: 60.41          A*: 11.54          B*: 16.51                                    Right: L*: 60.59          A*: 9.27            B*: 15.94                STUDY PROCEDURE DATA      Study Date: 06/24/22  Study Time (Education Start Time): 09:40:00   Device IDs:  Day Watch ID 1: " [id number]    Night Watch ID 2: [id number]     Watch Enclosure 1: Aluminum      Watch Enclosure 2: Aluminum   Watch Size 1: 40 mm  Watch Size 2: 40 mm  Nonin ID 1: [id number]    Nonin ID 2: [id number]   Lifestyle:  Moisturizing Cream/Lotion applied at wrist? No  Additional Comments (Device/participant issues): n/a     24-JUN-2022  Radha Powers

## 2022-06-24 NOTE — TELEPHONE ENCOUNTER
[facility] Study At-Home Unscheduled Outbound Call    Study description:   Multiconditions PPG Sub-Study. The purpose of this research study is to collect data related to health for the development of mobile technologies. This data will include physiological signal recordings from medical devices and smart GridPoint data collection software. This study is not to provide any treatment. This study will only collect information for research and  purposes.       Subject Number: [id number]     Study Day: Day 1    Oban study subject was called today to;     Confirm subjects are following the subject instructions     Address any technical difficulties     Answer any questions    Notably, the patient was called today to discuss device set up. The subject had hooked up the phones to wifi, was wearing the devices properly, and understood the procedures he needed to complete for the study.         24-JUN-2022    Justo Carrizales

## 2022-06-24 NOTE — PROGRESS NOTES
At-Home Riana Inclusion/Exclusion Criteria:     Study Name: Riana  : Robson Oliveira MD    Protocol version: 4.0 - [id number]    Criteria #  Inclusion Criterita (ALL MUST BE YES)  YES/NO/N/A   1   Male and female subjects at least 18 years old at the time of the screening visit.  Yes   2   Wrist circumference and 120mm-245mm (inclusive).  Yes   3   Ability to understand and provide written informed consent.  Yes   4   Willing and able to comply with study procedures, activities, and duration as described in the ICF.   Yes   5  If not vaccinated and up to date with COVID -19 vaccinations, willing to take a rapid AG test, or PCR test, and produce a negative result within 3 days of the study visit(s).   Yes   6   Didn't smoke at least 2 hours before screening (or study procedures).  Yes   7   Neither subject, nor any individuals living with subject, have had new development in the following within the last 14 days prior to study screening:        a. Have failed to comply with any country, state, and local travel restrictions.         b. Have had any unexpected flu-like symptoms (such as fever, chills, cough, shortness of breath, diarrhea, sore throat, runny nose, or trouble breathing).        c. Have had any contact with people confirmed COVID-19.         d. Have been confirmed to have COVID-19 and have not subsequently received a negative COVID-19 test result.    Yes   8   If Cohort 1 (In-Lab Cardiac Conditions):         a. Indication of a rhythm disorder (dated up to 5 years ago) as outlined in Table A (see Protocol page 9), and be present at the time of screening.     NA   9   If Cohort 2 (In-Lab Respiratory Conditions):          a. Prior diagnosis of one of the following conditions, within 5 years: 1) Moderate (GOLD Stage 2) COPD, 2) Severe or Very Severe (GOLD Stage 3 or 4) COPD, 3) Idiopathic pulmonary fibrosis.          b. Record of spirometry FEV% result (within 5 years) are available.    NA    10   If Cohort 3 (At-Home respiratory Conditions):         a. Prior diagnosis of one of the following conditions, within 5 years: 1) Moderate (GOLD Stage 2) COPD, 2) Severe or Very Severe (GOLD Stage 3 or 4) COPD, 3) Idiopathic pulmonary fibrosis.          b. Record of spirometry FEV% result (within 5 years) are available.          c. Willing and able to use a study provided iPhone and navigate study Ovi flow.          d. Stable WIFI at home and are able to connect it to study iPhones     Yes       Criteria # Exclusion Criteria (ALL MUST BE NO) YES/NO/N/A   1   Individuals with severe contact allergies to standard adhesives, or other materials found in pulse oximetry sensors, ECG electrodes, respiration monitor electrodes, wearables device bands and watch surfaces.    No   2   Individuals that do not have at least 2 intact fingers (excluding thumb, *pinky will be excluded only for cohort 1 and cohort 2) on non-preferred hand to wear a watch.    No   3   Open wound(s) or active infections on wrists at study watch wear locations or where the ECG electrodes may be placed.    No   4   Physical disability that prevents safe and adequate testing.  No   5   Individuals with a pacemaker or an automated implantable cardioverter-defibrillator (AICD).    No   6   Individuals with physical scars, tattoos, or other skin markings on wrists where sensors or finger sensor are to be worn.    No   7   Individuals with clinically significant hand tremors, as judged by a Study Investigator.    No   8   Pregnant women.     No   9   Subjects with any medical history, physical exam, vital sign or any other study procedure finding/assessment that in the opinion of the Investigator could compromise subject safety during study participation or interfere with the study integrity and/or the accurate assessment of the study objectives.    No   10   Presence of skin conditions or disease at the fingers of SpO2% application sites that could  interfere with SpO2% sensor placement or the accuracy of measurement. Such conditions include, but are not limited to: extensive scarring, skin lesions, redness, infection or edema at target measurement sites.     No   11   Presence of long fingernails that interfere with the placement of the SpO2% sensor or nail polish at the fingers of SpO2% application sites.    No   12   Medical history or physical assessment finding that makes the subject inappropriate for participation, according to the investigator.    No     Patient does fulfill study inclusion criteria and no exclusion criteria are found.     Robson Oliveira MD    24-JUN-2022    Radha Powers

## 2022-06-27 ENCOUNTER — VIRTUAL VISIT (OUTPATIENT)
Dept: CARDIOLOGY | Facility: CLINIC | Age: 82
End: 2022-06-27
Payer: COMMERCIAL

## 2022-06-27 DIAGNOSIS — Z00.6 EXAMINATION OF PARTICIPANT OR CONTROL IN CLINICAL RESEARCH: Primary | ICD-10-CM

## 2022-06-27 PROCEDURE — 99207 PR NO CHARGE NURSE ONLY: CPT

## 2022-06-27 NOTE — PROGRESS NOTES
"[facility] Study At-Home Phone Visit    Study description:   [facility] Sub-Study. The purpose of this research study is to collect data related to health for the development of mobile technologies. This data will include physiological signal recordings from medical devices and smart watch data collection software. This study is not to provide any treatment. This study will only collect information for research and  purposes.       Subject Number: [id number]    Study Day: Day 4    Oban study subject was called today to;     Confirm subjects are following the subject instructions     Address any technical difficulties     Answer any questions    Reminders Checklist:   [x]  Unlock the watches: Passcode - [id number]    -When placing them on the  or on themselves   [x]  Visually confirm Wi-Fi connection and charging setup  [x]  Unlock phones   -Unlock before placing on   -Should NOT show lock icon  [x]  Check Flubber aracelis for incomplete surveys   -Morning surveys: End of Night task on NIGHT phone    -Evening surveys: End of Day task on DAY phone  [x]  Take devices off before showering/getting them wet  [x]  Make sure to dismiss any update notifications. -Tap \"Not Now\"  [x]  Good Nonin wear    -While resting/relaxing, not while typing doing anything hand intensive   [x]  Remind them of next appointment with us     Adverse Events & Con Med Assessment Performed?   [x]  (If yes, please generate adverse event report for PI aruna queen)    Subject reports that he got 38 hours of wear time out of his blue Nonin before it .  He endorsed getting 2 days of weekend wear this past weekend. He reports that his Apple watches have been dead by the end of his wear time but he is unlocking them when they wake back up on the chargers.     2022    Radha Powers      "

## 2022-06-28 ENCOUNTER — OFFICE VISIT (OUTPATIENT)
Dept: CARDIOLOGY | Facility: CLINIC | Age: 82
End: 2022-06-28
Payer: COMMERCIAL

## 2022-06-28 ENCOUNTER — ALLIED HEALTH/NURSE VISIT (OUTPATIENT)
Dept: CARDIOLOGY | Facility: CLINIC | Age: 82
End: 2022-06-28
Payer: COMMERCIAL

## 2022-06-28 DIAGNOSIS — Z00.6 EXAMINATION OF PARTICIPANT OR CONTROL IN CLINICAL RESEARCH: Primary | ICD-10-CM

## 2022-06-28 PROCEDURE — 99207 PR NO CHARGE NURSE ONLY: CPT

## 2022-06-28 NOTE — PROGRESS NOTES
[facility] Study At-Home On-Site Visit    Study description:   [facility] [facility] Sub-Study. The purpose of this research study is to collect data related to health for the development of mobile technologies. This data will include physiological signal recordings from medical devices and smart watch data collection software. This study is not to provide any treatment. This study will only collect information for research and  purposes.       Subject Number: [id number]    Study Day: Day 5    Samy Henry a 82 year old male, was onsite today to return their first Nonin device and receive their third Nonin devices, per study protocol. With the new device, the subject was reminded to continue following the subject instructions. All questions were answered to their satisfaction.     Nonin ID 3: [id number]     Adverse Events & Con Med Assessment Performed?   [x]     28- JUN-2022    Radha Powers

## 2022-07-01 ENCOUNTER — VIRTUAL VISIT (OUTPATIENT)
Dept: CARDIOLOGY | Facility: CLINIC | Age: 82
End: 2022-07-01
Payer: COMMERCIAL

## 2022-07-01 DIAGNOSIS — Z00.6 EXAMINATION OF PARTICIPANT OR CONTROL IN CLINICAL RESEARCH: Primary | ICD-10-CM

## 2022-07-01 PROCEDURE — 99207 PR NO CHARGE NURSE ONLY: CPT

## 2022-07-01 NOTE — PROGRESS NOTES
"[facility] Study At-Home Phone Visit    Study description:   [employer] P[facility] Sub-Study. The purpose of this research study is to collect data related to health for the development of mobile technologies. This data will include physiological signal recordings from medical devices and smart watch data collection software. This study is not to provide any treatment. This study will only collect information for research and  purposes.       Subject Number: [id number]    Study Day: Day 8     Oban study subject was called today to;     Confirm subjects are following the subject instructions     Address any technical difficulties     Answer any questions    Reminders Checklist:   [x]  Unlock the watches: Passcode - [id number]    -When placing them on the  or on themselves   [x]  Visually confirm Wi-Fi connection and charging setup  [x]  Unlock phones   -Unlock before placing on   -Should NOT show lock icon  [x]  Check Flubber aracelis for incomplete surveys   -Morning surveys: End of Night task on NIGHT phone    -Evening surveys: End of Day task on DAY phone  [x]  Take devices off before showering/getting them wet  [x]  Make sure to dismiss any update notifications. -Tap \"Not Now\"  [x]  Good Nonin wear    -While resting/relaxing, not while typing doing anything hand intensive   [x]  Remind them of next appointment with us     Adverse Events & Con Med Assessment Performed?   [x]  (If yes, please generate adverse event report for PI to cosign)     The subject reported needing one more night left to complete the minimum data requirement for the study. Staff recommended wearing the devices extra after the minimum was fulfilled. Staff highlighted that the devices should not be worn when the Nonin has no battery but surveys should be completed and devices unlocked.     1-JUL-2022    Justo Carrizales    "

## 2022-07-05 ENCOUNTER — TELEPHONE (OUTPATIENT)
Dept: CARDIOLOGY | Facility: CLINIC | Age: 82
End: 2022-07-05
Payer: COMMERCIAL

## 2022-07-05 DIAGNOSIS — Z00.6 EXAMINATION OF PARTICIPANT OR CONTROL IN CLINICAL RESEARCH: Primary | ICD-10-CM

## 2022-07-05 PROCEDURE — 99207 PR NO CHARGE NURSE ONLY: CPT

## 2022-07-05 NOTE — TELEPHONE ENCOUNTER
"[facility] Study At-Home Phone Visit    Study description:   [facility] P[facility] Sub-Study. The purpose of this research study is to collect data related to health for the development of mobile technologies. This data will include physiological signal recordings from medical devices and smart watch data collection software. This study is not to provide any treatment. This study will only collect information for research and  purposes.       Subject Number: [id number]    Study Day: Day 11    Oban study subject was called today to;     Confirm subjects are following the subject instructions     Address any technical difficulties      Answer any questions    Reminders Checklist:   [x]  Unlock the watches: Passcode - [id number]    -When placing them on the  or on themselves   [x]  Visually confirm Wi-Fi connection and charging setup  [x]  Unlock phones   -Unlock before placing on   -Should NOT show lock icon  [x]  Check Flubber aracelis for incomplete surveys   -Morning surveys: End of Night task on NIGHT phone    -Evening surveys: End of Day task on DAY phone  [x]  Take devices off before showering/getting them wet  [x]  Make sure to dismiss any update notifications. -Tap \"Not Now\"  [x]  Good Nonin wear    -While resting/relaxing, not while typing doing anything hand intensive   [x]  Remind them of next appointment with us     Adverse Events & Con Med Assessment Performed?   [x]  (If yes, please generate adverse event report for PI to cosign)    Subject notes his Nonin  over the weekend. Explained the need to continue answering surveys for the remainder of the week. Patient voiced understanding and reported he does not see the \"Next and Done\" screen following the 30-min upload time sometimes. Issue was noted. Unfortunately, the compliance report is not updated to the patient's progress and was unable to have an extensive conversation regarding his data collection. However, patient attest to answering " his surveys twice a day. All other concerns were addressed.     05-JUL-2022    Jesus Carvalho

## 2022-07-08 ENCOUNTER — ALLIED HEALTH/NURSE VISIT (OUTPATIENT)
Dept: CARDIOLOGY | Facility: CLINIC | Age: 82
End: 2022-07-08
Payer: COMMERCIAL

## 2022-07-08 DIAGNOSIS — Z00.6 EXAMINATION OF PARTICIPANT OR CONTROL IN CLINICAL RESEARCH: Primary | ICD-10-CM

## 2022-07-08 PROCEDURE — 99207 PR NO CHARGE NURSE ONLY: CPT

## 2022-07-08 NOTE — PROGRESS NOTES
[facility] At-Home Study End Note    Study Description:   [facility] [facility] Sub-Study. The purpose of this research study is to collect data related to health for the development of mobile technologies. This data will include physiological signal recordings from medical devices and smart watch data collection software. This study is not to provide any treatment. This study will only collect information for research and  purposes.     Adverse Events & Con Med Assessment Performed?   [x]   Did the Subject Complete the At-Home Session? Yes    If no, Termination Reason: N/A    Study Termination/Completion Date: 8-JUL-2022    Subject returned devices today and this completes this study for the subject.    Roro Peck, EP

## 2022-08-17 ASSESSMENT — ENCOUNTER SYMPTOMS
CONSTIPATION: 0
DIZZINESS: 0
HEMATOCHEZIA: 0
ARTHRALGIAS: 0
HEADACHES: 0
PALPITATIONS: 0
FEVER: 0
PARESTHESIAS: 0
JOINT SWELLING: 0
HEARTBURN: 0
NAUSEA: 0
SHORTNESS OF BREATH: 1
ABDOMINAL PAIN: 0
HEMATURIA: 0
NERVOUS/ANXIOUS: 0
WEAKNESS: 0
FREQUENCY: 1
COUGH: 0
DYSURIA: 0
EYE PAIN: 0
DIARRHEA: 0
MYALGIAS: 0
SORE THROAT: 0
CHILLS: 0

## 2022-08-17 ASSESSMENT — ACTIVITIES OF DAILY LIVING (ADL): CURRENT_FUNCTION: NO ASSISTANCE NEEDED

## 2022-08-22 ENCOUNTER — LAB (OUTPATIENT)
Dept: LAB | Facility: CLINIC | Age: 82
End: 2022-08-22
Payer: COMMERCIAL

## 2022-08-22 DIAGNOSIS — Z13.220 SCREENING FOR HYPERLIPIDEMIA: ICD-10-CM

## 2022-08-22 DIAGNOSIS — I10 ESSENTIAL HYPERTENSION WITH GOAL BLOOD PRESSURE LESS THAN 140/90: ICD-10-CM

## 2022-08-22 LAB
ANION GAP SERPL CALCULATED.3IONS-SCNC: 5 MMOL/L (ref 3–14)
BUN SERPL-MCNC: 27 MG/DL (ref 7–30)
CALCIUM SERPL-MCNC: 9.2 MG/DL (ref 8.5–10.1)
CHLORIDE BLD-SCNC: 105 MMOL/L (ref 94–109)
CHOLEST SERPL-MCNC: 124 MG/DL
CO2 SERPL-SCNC: 28 MMOL/L (ref 20–32)
CREAT SERPL-MCNC: 1.16 MG/DL (ref 0.66–1.25)
FASTING STATUS PATIENT QL REPORTED: YES
GFR SERPL CREATININE-BSD FRML MDRD: 63 ML/MIN/1.73M2
GLUCOSE BLD-MCNC: 96 MG/DL (ref 70–99)
HDLC SERPL-MCNC: 50 MG/DL
LDLC SERPL CALC-MCNC: 58 MG/DL
NONHDLC SERPL-MCNC: 74 MG/DL
POTASSIUM BLD-SCNC: 4.5 MMOL/L (ref 3.4–5.3)
SODIUM SERPL-SCNC: 138 MMOL/L (ref 133–144)
TRIGL SERPL-MCNC: 81 MG/DL

## 2022-08-22 PROCEDURE — 36415 COLL VENOUS BLD VENIPUNCTURE: CPT

## 2022-08-22 PROCEDURE — 80048 BASIC METABOLIC PNL TOTAL CA: CPT

## 2022-08-22 PROCEDURE — 84550 ASSAY OF BLOOD/URIC ACID: CPT | Performed by: FAMILY MEDICINE

## 2022-08-22 PROCEDURE — 80061 LIPID PANEL: CPT

## 2022-08-23 DIAGNOSIS — M1A.00X0 IDIOPATHIC CHRONIC GOUT WITHOUT TOPHUS, UNSPECIFIED SITE: Primary | ICD-10-CM

## 2022-08-23 LAB — URATE SERPL-MCNC: 9.1 MG/DL (ref 3.5–7.2)

## 2022-08-24 ENCOUNTER — OFFICE VISIT (OUTPATIENT)
Dept: FAMILY MEDICINE | Facility: CLINIC | Age: 82
End: 2022-08-24
Payer: COMMERCIAL

## 2022-08-24 VITALS
OXYGEN SATURATION: 96 % | HEART RATE: 70 BPM | WEIGHT: 183 LBS | SYSTOLIC BLOOD PRESSURE: 136 MMHG | HEIGHT: 70 IN | DIASTOLIC BLOOD PRESSURE: 78 MMHG | TEMPERATURE: 98 F | BODY MASS INDEX: 26.2 KG/M2

## 2022-08-24 DIAGNOSIS — J44.9 CHRONIC OBSTRUCTIVE PULMONARY DISEASE, UNSPECIFIED COPD TYPE (H): ICD-10-CM

## 2022-08-24 DIAGNOSIS — I10 ESSENTIAL HYPERTENSION WITH GOAL BLOOD PRESSURE LESS THAN 140/90: ICD-10-CM

## 2022-08-24 DIAGNOSIS — I25.10 CORONARY ARTERY DISEASE INVOLVING NATIVE CORONARY ARTERY OF NATIVE HEART WITHOUT ANGINA PECTORIS: ICD-10-CM

## 2022-08-24 DIAGNOSIS — M1A.00X0 IDIOPATHIC CHRONIC GOUT WITHOUT TOPHUS, UNSPECIFIED SITE: ICD-10-CM

## 2022-08-24 DIAGNOSIS — E79.0 HYPERURICEMIA: ICD-10-CM

## 2022-08-24 DIAGNOSIS — Z00.00 ENCOUNTER FOR MEDICARE ANNUAL WELLNESS EXAM: Primary | ICD-10-CM

## 2022-08-24 DIAGNOSIS — E78.5 HYPERLIPIDEMIA LDL GOAL <100: ICD-10-CM

## 2022-08-24 PROCEDURE — 99213 OFFICE O/P EST LOW 20 MIN: CPT | Mod: 25 | Performed by: FAMILY MEDICINE

## 2022-08-24 PROCEDURE — G0439 PPPS, SUBSEQ VISIT: HCPCS | Performed by: FAMILY MEDICINE

## 2022-08-24 RX ORDER — CARVEDILOL 3.12 MG/1
3.12 TABLET ORAL 2 TIMES DAILY WITH MEALS
Qty: 180 TABLET | Refills: 3 | Status: SHIPPED | OUTPATIENT
Start: 2022-08-24 | End: 2022-11-17 | Stop reason: SINTOL

## 2022-08-24 RX ORDER — LISINOPRIL 40 MG/1
40 TABLET ORAL DAILY
Qty: 90 TABLET | Refills: 3 | Status: SHIPPED | OUTPATIENT
Start: 2022-08-24 | End: 2023-08-25

## 2022-08-24 RX ORDER — ATORVASTATIN CALCIUM 40 MG/1
40 TABLET, FILM COATED ORAL DAILY
Qty: 90 TABLET | Refills: 3 | Status: SHIPPED | OUTPATIENT
Start: 2022-08-24 | End: 2023-08-25

## 2022-08-24 RX ORDER — SPIRONOLACTONE 25 MG/1
25 TABLET ORAL DAILY
Qty: 90 TABLET | Refills: 3 | Status: SHIPPED | OUTPATIENT
Start: 2022-08-24 | End: 2023-01-23

## 2022-08-24 ASSESSMENT — ENCOUNTER SYMPTOMS
PALPITATIONS: 0
WEAKNESS: 0
HEMATOCHEZIA: 0
DYSURIA: 0
MYALGIAS: 0
ABDOMINAL PAIN: 0
SORE THROAT: 0
FREQUENCY: 1
SHORTNESS OF BREATH: 1
JOINT SWELLING: 0
HEADACHES: 0
ARTHRALGIAS: 0
FEVER: 0
PARESTHESIAS: 0
NERVOUS/ANXIOUS: 0
CONSTIPATION: 0
NAUSEA: 0
HEMATURIA: 0
EYE PAIN: 0
CHILLS: 0
DIARRHEA: 0
COUGH: 0
DIZZINESS: 0
HEARTBURN: 0

## 2022-08-24 ASSESSMENT — ACTIVITIES OF DAILY LIVING (ADL): CURRENT_FUNCTION: NO ASSISTANCE NEEDED

## 2022-08-24 ASSESSMENT — PAIN SCALES - GENERAL: PAINLEVEL: NO PAIN (0)

## 2022-08-24 NOTE — PROGRESS NOTES
"SUBJECTIVE:   Samy Henry is a 82 year old male who presents for a Preventive Visit and follow-up on baseline health conditions.      Patient has been advised of split billing requirements and indicates understanding: Yes  Are you in the first 12 months of your Medicare coverage?      Healthy Habits:     In general, how would you rate your overall health?  Good    Frequency of exercise:  2-3 days/week    Duration of exercise:  45-60 minutes    Do you usually eat at least 4 servings of fruit and vegetables a day, include whole grains    & fiber and avoid regularly eating high fat or \"junk\" foods?  No    Taking medications regularly:  Yes    Medication side effects:  Other    Ability to successfully perform activities of daily living:  No assistance needed    Home Safety:  No safety concerns identified    Hearing Impairment:  No hearing concerns    In the past 6 months, have you been bothered by leaking of urine?  No    In general, how would you rate your overall mental or emotional health?  Good      PHQ-2 Total Score: 0    Additional concerns today:  No    Do you feel safe in your environment? Yes    Have you ever done Advance Care Planning? (For example, a Health Directive, POLST, or a discussion with a medical provider or your loved ones about your wishes): No, advance care planning information given to patient to review.  Patient plans to discuss their wishes with loved ones or provider.         Fall risk  Fallen 2 or more times in the past year?: No  Any fall with injury in the past year?: No    Cognitive Screening   1) Repeat 3 items (Leader, Season, Table)    2) Clock draw: NORMAL  3) 3 item recall: Recalls 3 objects  Results: NORMAL clock, 1-2 items recalled: COGNITIVE IMPAIRMENT LESS LIKELY    Mini-CogTM Copyright JUAN MANUEL Arguello. Licensed by the author for use in [facility]; reprinted with permission ([email or web address]). All rights reserved.      Do you have sleep apnea, excessive snoring or daytime " drowsiness?: no    Reviewed and updated as needed this visit by clinical staff                    Reviewed and updated as needed this visit by Provider                   Social History     Tobacco Use     Smoking status: Former Smoker     Packs/day: 1.00     Types: Cigarettes     Start date: 1959     Quit date: 1974     Years since quittin.6     Smokeless tobacco: Never Used   Substance Use Topics     Alcohol use: Yes     Comment: 2 glasses of wine per day      If you drink alcohol do you typically have >3 drinks per day or >7 drinks per week? No    Alcohol Use 2022   Prescreen: >3 drinks/day or >7 drinks/week? No   Prescreen: >3 drinks/day or >7 drinks/week? -     He remains on baseline medications as below, primarily for CAD and hypertension.  He is not having any angina.  He is not needing to use any nitroglycerin.  His blood pressure was starting to run a bit on the low side, so we cut down his carvedilol to 1 pill a day instead of twice a day.  Home blood pressure readings are generally still running around 120/70 on average.  Heart rates are generally in the 60s to low 70s.  He is generally feeling well.  He is not having any shortness of breath at rest.  He can walk long distances without dyspnea on exertion.  If he carries something heavy, then sometimes he will get short of breath.  He was felt to have severe COPD by spirometry in the past, but he was not really helped by medications, so he is on no specific breathing medications.    Current providers sharing in care for this patient include:   Patient Care Team:  Cabrera Bradshaw MD as PCP - General  Cabrera Bradshaw MD as Referring Physician (Family Medicine)  Michele Guillen MD as MD (Internal Medicine)  Michele Guillen MD as Assigned Pulmonology Provider  Cabrera Bradshaw MD as Assigned PCP  Roro Colon MD as MD (Otolaryngology)  Bacilio Case MD as Assigned Surgical Provider    The following health  maintenance items are reviewed in Epic and correct as of today:  Health Maintenance Due   Topic Date Due     COPD ACTION PLAN  Never done     ZOSTER IMMUNIZATION (2 of 3) 2009     MEDICARE ANNUAL WELLNESS VISIT  2022     Patient Active Problem List   Diagnosis     Colorectal polyps     Advanced directives, counseling/discussion     Hyperlipidemia LDL goal <100     History of non-ST elevation myocardial infarction (NSTEMI)     FHx: prostate cancer     Essential hypertension with goal blood pressure less than 140/90     Idiopathic chronic gout without tophus, unspecified site     Coronary artery disease involving native coronary artery of native heart without angina pectoris     COPD (chronic obstructive pulmonary disease) (H)     Hyperuricemia     Past Surgical History:   Procedure Laterality Date     CATARACT IOL, RT/LT  2009    right     CATARACT IOL, RT/LT      left     COLONOSCOPY       COLONOSCOPY N/A 2015    Procedure: COLONOSCOPY;  Surgeon: Francisco Horowitz MD;  Location: [facility] OR     COLONOSCOPY  2020     COLONOSCOPY WITH CO2 INSUFFLATION N/A 2015    Procedure: COLONOSCOPY WITH CO2 INSUFFLATION;  Surgeon: Francisco Horowitz MD;  Location: [facility] OR     removal of rectal polyp  08/10     STENT, CORONARY, SAMANTA  10/13    3 coronary stents s/p NSTEMI     TONSILLECTOMY & ADENOIDECTOMY  194     VASECTOMY  78       Social History     Tobacco Use     Smoking status: Former Smoker     Packs/day: 1.00     Types: Cigarettes     Start date: 1959     Quit date: 1974     Years since quittin.6     Smokeless tobacco: Never Used   Substance Use Topics     Alcohol use: Yes     Comment: 2 glasses of wine per day      Family History   Problem Relation Age of Onset     Hypertension Mother      Breast Cancer Mother      Cancer - colorectal Father      Prostate Cancer Father      Cancer Brother         lymphoma     Hypertension Sister      Neurologic Disorder Maternal  "Grandmother      Cancer Paternal Grandfather          Current Outpatient Medications   Medication Sig Dispense Refill     ASPIRIN 81 MG PO TABS 1 TABLET DAILY       atorvastatin (LIPITOR) 40 MG tablet Take 1 tablet (40 mg) by mouth daily 90 tablet 3     carvedilol (COREG) 3.125 MG tablet Take 1 tablet (3.125 mg) by mouth 2 times daily (with meals) 180 tablet 3     lisinopril (ZESTRIL) 40 MG tablet Take 1 tablet (40 mg) by mouth daily 90 tablet 3     MULTI-VITAMIN PO TABS 1 TABLET DAILY       spironolactone (ALDACTONE) 25 MG tablet Take 1 tablet (25 mg) by mouth daily 90 tablet 3     nitroGLYcerin (NITROSTAT) 0.4 MG sublingual tablet Place 1 tablet (0.4 mg) under the tongue every 5 minutes as needed (Patient not taking: No sig reported) 25 tablet 1     No Known Allergies      Review of Systems   Constitutional: Negative for chills and fever.   HENT: Negative for congestion, ear pain, hearing loss and sore throat.    Eyes: Negative for pain and visual disturbance.   Respiratory: Positive for shortness of breath. Negative for cough.    Cardiovascular: Negative for chest pain, palpitations and peripheral edema.   Gastrointestinal: Negative for abdominal pain, constipation, diarrhea, heartburn, hematochezia and nausea.   Genitourinary: Positive for frequency and impotence. Negative for dysuria, genital sores, hematuria, penile discharge and urgency.   Musculoskeletal: Negative for arthralgias, joint swelling and myalgias.   Skin: Negative for rash.   Neurological: Negative for dizziness, weakness, headaches and paresthesias.   Psychiatric/Behavioral: Negative for mood changes. The patient is not nervous/anxious.          OBJECTIVE:   /78 (BP Location: Right arm, Patient Position: Chair, Cuff Size: Adult Regular)   Pulse 70   Temp 98  F (36.7  C) (Oral)   Ht 1.788 m (5' 10.4\")   Wt 83 kg (183 lb)   SpO2 96%   BMI 25.96 kg/m   Estimated body mass index is 25.96 kg/m  as calculated from the following:    Height " "as of this encounter: 1.788 m (5' 10.4\").    Weight as of this encounter: 83 kg (183 lb).  Physical Exam  GENERAL: healthy, alert and no distress  EYES: Eyes grossly normal to inspection, PERRL and conjunctivae and sclerae normal  HENT: Grossly normal  NECK: no adenopathy, no asymmetry, masses, or scars and thyroid normal to palpation  RESP: lungs clear to auscultation - no rales, rhonchi or wheezes  CV: regular rate and rhythm, normal S1 S2, no S3 or S4, no murmur, click or rub, no peripheral edema and peripheral pulses intact  ABDOMEN: soft, nontender, no hepatosplenomegaly, no masses   MS: no gross musculoskeletal defects noted, no edema  SKIN: no suspicious lesions or rashes  NEURO: Normal strength and tone, mentation intact and speech normal  PSYCH: mentation appears normal, affect normal/bright    Diagnostic Test Results:  Labs reviewed in Epic  Orders Only on 08/23/2022   Component Date Value Ref Range Status     Uric Acid 08/22/2022 9.1 (A) 3.5 - 7.2 mg/dL Final   Lab on 08/22/2022   Component Date Value Ref Range Status     Sodium 08/22/2022 138  133 - 144 mmol/L Final     Potassium 08/22/2022 4.5  3.4 - 5.3 mmol/L Final     Chloride 08/22/2022 105  94 - 109 mmol/L Final     Carbon Dioxide (CO2) 08/22/2022 28  20 - 32 mmol/L Final     Anion Gap 08/22/2022 5  3 - 14 mmol/L Final     Urea Nitrogen 08/22/2022 27  7 - 30 mg/dL Final     Creatinine 08/22/2022 1.16  0.66 - 1.25 mg/dL Final     Calcium 08/22/2022 9.2  8.5 - 10.1 mg/dL Final     Glucose 08/22/2022 96  70 - 99 mg/dL Final     GFR Estimate 08/22/2022 63  >60 mL/min/1.73m2 Final    Effective December 21, 2021 eGFRcr in adults is calculated using the 2021 CKD-EPI creatinine equation which includes age and gender (Barbara leon al., [place], DOI: 10.1056/TVWFih4504223)     Cholesterol 08/22/2022 124  <200 mg/dL Final     Triglycerides 08/22/2022 81  <150 mg/dL Final     Direct Measure HDL 08/22/2022 50  >=40 mg/dL Final     LDL Cholesterol Calculated 08/22/2022 " "58  <=100 mg/dL Final     Non HDL Cholesterol 08/22/2022 74  <130 mg/dL Final     Patient Fasting > 8hrs? 08/22/2022 Yes   Final         ASSESSMENT / PLAN:       ICD-10-CM    1. Encounter for Medicare annual wellness exam  Z00.00    2. Coronary artery disease involving native coronary artery of native heart without angina pectoris  I25.10    3. Chronic obstructive pulmonary disease, unspecified COPD type (H)  J44.9 COPD ACTION PLAN   4. Hyperlipidemia LDL goal <100  E78.5 atorvastatin (LIPITOR) 40 MG tablet   5. Essential hypertension with goal blood pressure less than 140/90  I10 carvedilol (COREG) 3.125 MG tablet     lisinopril (ZESTRIL) 40 MG tablet     spironolactone (ALDACTONE) 25 MG tablet   6. Idiopathic chronic gout without tophus, unspecified site  M1A.00X0    7. 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.0      Blood pressure and other vital signs look good  His labs look nice and healthy except for the elevated uric acid level  Discussed dietary management for that  He is not having any gout episodes, but if he does we would also consider use of allopurinol  We will continue his same baseline meds  I suggested he get a Shingrix vaccine from his local pharmacy and then a booster dose in 2 to 6 months  Plan a tentative recheck in 1 year, or sooner prn    COUNSELING:  Reviewed preventive health counseling, as reflected in patient instructions       Regular exercise       Healthy diet/nutrition    Estimated body mass index is 25.47 kg/m  as calculated from the following:    Height as of 6/24/22: 1.79 m (5' 10.47\").    Weight as of 6/24/22: 81.6 kg (179 lb 14.3 oz).        He reports that he quit smoking about 48 years ago. His smoking use included cigarettes. He started smoking about 63 years ago. He smoked 1.00 pack per day. He has never used smokeless tobacco.      Appropriate preventive services were discussed with this patient, including applicable screening as appropriate for cardiovascular disease, diabetes, " osteopenia/osteoporosis, and glaucoma.  As appropriate for age/gender, discussed screening for colorectal cancer, prostate cancer, breast cancer, and cervical cancer. Checklist reviewing preventive services available has been given to the patient.    Reviewed patients plan of care and provided an AVS. The Basic Care Plan (routine screening as documented in Health Maintenance) for Samy meets the Care Plan requirement. This Care Plan has been established and reviewed with the Patient.    Counseling Resources:  ATP IV Guidelines  Pooled Cohorts Equation Calculator  Breast Cancer Risk Calculator  Breast Cancer: Medication to Reduce Risk  FRAX Risk Assessment  ICSI Preventive Guidelines  Dietary Guidelines for Americans, 2010  USDA's MyPlate  ASA Prophylaxis  Lung CA Screening    Cabrera Bradshaw MD  [facility]    Identified Health Risks:

## 2022-08-24 NOTE — PROGRESS NOTES
"    The patient was counseled and encouraged to consider modifying their diet and eating habits. He was provided with information on recommended healthy diet options.  Answers for HPI/ROS submitted by the patient on 8/17/2022  In general, how would you rate your overall physical health?: good  Frequency of exercise:: 2-3 days/week  Do you usually eat at least 4 servings of fruit and vegetables a day, include whole grains & fiber, and avoid regularly eating high fat or \"junk\" foods? : No  Taking medications regularly:: Yes  Medication side effects:: Other  Activities of Daily Living: no assistance needed  Home safety: no safety concerns identified  Hearing Impairment:: no hearing concerns  In the past 6 months, have you been bothered by leaking of urine?: No  abdominal pain: No  Blood in stool: No  Blood in urine: No  chest pain: No  chills: No  congestion: No  constipation: No  cough: No  diarrhea: No  dizziness: No  ear pain: No  eye pain: No  nervous/anxious: No  fever: No  frequency: Yes  genital sores: No  headaches: No  hearing loss: No  heartburn: No  arthralgias: No  joint swelling: No  peripheral edema: No  mood changes: No  myalgias: No  nausea: No  dysuria: No  palpitations: No  Skin sensation changes: No  sore throat: No  urgency: No  rash: No  shortness of breath: Yes  visual disturbance: No  weakness: No  impotence: Yes  penile discharge: No  In general, how would you rate your overall mental or emotional health?: good  Additional concerns today:: No  Duration of exercise:: 45-60 minutes        "

## 2022-08-24 NOTE — LETTER
My COPD Action Plan     Name: Samy Henry    YOB: 1940   Date: 8/24/2022    My doctor: Cabrera Bradshaw MD   [facility] [address]  [facility] [id number] [phone]  No meds currently for it.     My COPD Severity: Severe = FeV1 < 30%-49%      Use of Oxygen: Oxygen Not Prescribed      Make sure you've had your pneumonia   vaccines.          GREEN ZONE       Doing well today      Usual level of activity and exercise    Usual amount of cough and mucus    No shortness of breath    Usual level of health (thinking clearly, sleeping well, feel like eating) Actions:      Take daily medicines    Use oxygen as prescribed    Follow regular exercise and diet plan    Avoid cigarette smoke and other irritants that harm the lungs           YELLOW ZONE          Having a bad day or flare up      Short of breath more than usual    A lot more sputum (mucus) than usual    Sputum looks yellow, green, tan, brown or bloody    More coughing or wheezing    Fever or chills    Less energy; trouble completing activities    Trouble thinking or focusing    Using quick relief inhaler or nebulizer more often    Poor sleep; symptoms wake me up    Do not feel like eating Actions:      Get plenty of rest    If you use oxygen, call you doctor to see if you should adjust your oxygen    Do breathing exercises or other things to help you relax    Let a loved one, friend or neighbor know you are feeling worse    Call your care team if you have 2 or more symptoms.  Start taking steroids or antibiotics if directed by your care team           RED ZONE       Need medical care now      Severe shortness of breath (feel you can't breathe)    Fever, chills    Not enough breath to do any activity    Trouble coughing up mucus, walking or talking    Blood in mucus    Frequent coughing   Rescue medicines are not working    Not able to sleep because of breathing    Feel confused or drowsy    Chest  pain    Actions:      Call your health care team.  If you cannot reach your care team, call 911 or go to the emergency room.        Annual Reminders:  Meet with Care Team, Flu Shot every Fall  Pharmacy:    [id number] - [place], [place] - [address] AT [facility] & [address] [place] [address] PHARMACY [id number] - [facility] - [place], [place] - [address] [place] [address]

## 2022-08-24 NOTE — PATIENT INSTRUCTIONS
Patient Education   Personalized Prevention Plan  You are due for the preventive services outlined below.  Your care team is available to assist you in scheduling these services.  If you have already completed any of these items, please share that information with your care team to update in your medical record.  Health Maintenance Due   Topic Date Due     COPD Action Plan  Never done     Zoster (Shingles) Vaccine (2 of 3) 07/03/2009       Understanding USDA MyPlate  The USDA has guidelines to help you make healthy food choices. These are called MyPlate. MyPlate shows the food groups that make up healthy meals using the image of a place setting. Before you eat, think about the healthiest choices for what to put on your plate or in your cup or bowl. To learn more about building a healthy plate, visit www.choosemyplate.gov.    The food groups    Fruits. Any fruit or 100% fruit juice counts as part of the Fruit Group. Fruits may be fresh, canned, frozen, or dried, and may be whole, cut-up, or pureed. Make 1/2 of your plate fruits and vegetables.    Vegetables. Any vegetable or 100% vegetable juice counts as a member of the Vegetable Group. Vegetables may be fresh, frozen, canned, or dried. They can be served raw or cooked and may be whole, cut-up, or mashed. Make 1/2 of your plate fruits and vegetables.    Grains. All foods made from grains are part of the Grains Group. These include wheat, rice, oats, cornmeal, and barley. Grains are often used to make foods such as bread, pasta, oatmeal, cereal, tortillas, and grits. Grains should be no more than 1/4 of your plate. At least half of your grains should be whole grains.    Protein. This group includes meat, poultry, seafood, beans and peas, eggs, processed soy products (such as tofu), nuts (including nut butters), and seeds. Make protein choices no more than 1/4 of your plate. Meat and poultry choices should be lean or low fat.    Dairy. The Dairy Group includes all  fluid milk products and foods made from milk that contain calcium, such as yogurt and cheese. (Foods that have little calcium, such as cream, butter, and cream cheese, are not part of this group.) Most dairy choices should be low-fat or fat-free.    Oils. Oils aren't a food group, but they do contain essential nutrients. However it's important to watch your intake of oils. These are fats that are liquid at room temperature. They include canola, corn, olive, soybean, vegetable, and sunflower oil. Foods that are mainly oil include mayonnaise, certain salad dressings, and soft margarines. You likely already get your daily oil allowance from the foods you eat.  Things to limit  Eating healthy also means limiting these things in your diet:       Salt (sodium). Many processed foods have a lot of sodium. To keep sodium intake down, eat fresh vegetables, meats, poultry, and seafood when possible. Purchase low-sodium, reduced-sodium, or no-salt-added food products at the store. And don't add salt to your meals at home. Instead, season them with herbs and spices such as dill, oregano, cumin, and paprika. Or try adding flavor with lemon or lime zest and juice.    Saturated fat. Saturated fats are most often found in animal products such as beef, pork, and chicken. They are often solid at room temperature, such as butter. To reduce your saturated fat intake, choose leaner cuts of meat and poultry. And try healthier cooking methods such as grilling, broiling, roasting, or baking. For a simple lower-fat swap, use plain nonfat yogurt instead of mayonnaise when making potato salad or macaroni salad.    Added sugars. These are sugars added to foods. They are in foods such as ice cream, candy, soda, fruit drinks, sports drinks, energy drinks, cookies, pastries, jams, and syrups. Cut down on added sugars by sharing sweet treats with a family member or friend. You can also choose fruit for dessert, and drink water or other unsweetened  zita Miramontes last reviewed this educational content on 6/1/2020 2000-2021 [employer]. All rights reserved. This information is not intended as a substitute for professional medical care. Always follow your healthcare professional's instructions.

## 2022-09-23 ENCOUNTER — TELEPHONE (OUTPATIENT)
Dept: RESEARCH | Facility: CLINIC | Age: 82
End: 2022-09-23
Payer: COMMERCIAL

## 2022-09-26 ENCOUNTER — ALLIED HEALTH/NURSE VISIT (OUTPATIENT)
Dept: RESEARCH | Facility: CLINIC | Age: 82
End: 2022-09-26
Payer: COMMERCIAL

## 2022-09-26 DIAGNOSIS — I21.4 NSTEMI (NON-ST ELEVATED MYOCARDIAL INFARCTION) (H): ICD-10-CM

## 2022-09-26 DIAGNOSIS — Z00.6 RESEARCH SUBJECT: Primary | ICD-10-CM

## 2022-09-26 LAB — APO A-I SERPL-MCNC: 6 MG/DL

## 2022-09-26 PROCEDURE — 36415 COLL VENOUS BLD VENIPUNCTURE: CPT

## 2022-09-26 PROCEDURE — 99207 PR NO CHARGE NURSE ONLY: CPT

## 2022-09-26 PROCEDURE — 83695 ASSAY OF LIPOPROTEIN(A): CPT

## 2022-09-26 NOTE — PROGRESS NOTES
[place]: A multicenter, Cross sectional study to characterize the Distribution of Lipoprotein(a) Levels Among Patients With Documented History of Atherosclerotic Cardiovascular Disease     [facility] Inclusion[facility]Exclusion [facility]    Inclusion Criteria  All must be Yes    101 Subject has provided informed consent prior to initiation of any study specific activities/procedures. Yes   102 Age 18 to 85 years. Yes   103 History of ASCVD as demonstrated by either:  MI (presumed type 1)    And/or    b) PCI (with high-risk features) with at least 1 of the following:  Age > 65 years  Diabetes mellitus  History of ischemic stroke  History of peripheral arterial disease  Residual stenosis > 50%  Multivessel PCI (ie, > 2 vessels, including branch arteries) Yes          Exclusion Criteria All must   be No   201 Subjects known to be currently receiving investigational drug in a clinical study that is anticipated to last > 1 year. No   202 Known Lp(a) value < 90 mg/dL (if measured in mass) or < 200 nmol/L (if measured in molar). No   203 Subject has a diagnosis of end-stage renal disease or requires dialysis. No   204 Poorly controlled (glycated hemoglobin [HbA1c] > 10%) diabetes mellitus (type 1 or type 2). No   205 Subject is receiving or has received lipoprotein apheresis to reduce Lp(a) within 3 months prior to enrollment. No   206 Known uncontrolled or recurrent ventricular tachycardia in the past 3 months prior to enrollment. No   207 Known malignancy (except non-melanoma skin cancers, cervical in situ carcinoma, breast ductal carcinoma in situ, or stage 1 prostate carcinoma) within the last 5 years prior to enrollment. No   208 Known history or evidence of clinically significant disease (eg, respiratory, gastrointestinal, or psychiatric disease) or unstable disorder or biomarker that, in the opinion of the investigator(s), would result in life expectancy < 5 years. No   209 Known hemorrhagic stroke. No       Participant  has met all inclusion criteria and no exclusion criteria and is ready to be enrolled in the [place] disease prevalence study.     Dr. Fontenot: Agree this patient can move to Enrollment   [x] Yes   [] No    Ana Butt RN

## 2022-09-26 NOTE — PROGRESS NOTES
[facility]- A multicenter, Cross sectional study to characterize the Distribution of Lipoprotein(a) Levels Among Patients With Documented History of Atherosclerotic Cardiovascular Disease       Samy Henry was seen in clinic today for the screening visit.    Research nurse met with subject and family to discuss consent and participation in the above noted study.    The study discussion included the following:     Study purpose    Qualifications for participation    Length of study participation    Study procedures    Risks and side effects    Benefits (if any)    Voluntary nature of participation    Alternatives to participation    Confidentiality of records    Financial considerations     Subject asked questions and agreed that he received answers that satisfied him.    Consent form [Version2 - [facility] version 2] signed on 26Sept2022 at 0830. Patient verbalized understanding. A signed copy was offered to the subject & forwarded to medical records. No study procedures were done prior to obtaining informed consent.      CRISELDA obtained.    Did Subject meet all inclusion criteria? Yes  Did Subject meet any Exclusion criteria? No    Lab draw performed without issue at 0837. Research staff will follow up with subject with results.      Ana Butt RN   Clinical Trials Office   [phone]      Current Outpatient Medications:      ASPIRIN 81 MG PO TABS, 1 TABLET DAILY, Disp: , Rfl:      atorvastatin (LIPITOR) 40 MG tablet, Take 1 tablet (40 mg) by mouth daily, Disp: 90 tablet, Rfl: 3     carvedilol (COREG) 3.125 MG tablet, Take 1 tablet (3.125 mg) by mouth 2 times daily (with meals), Disp: 180 tablet, Rfl: 3     lisinopril (ZESTRIL) 40 MG tablet, Take 1 tablet (40 mg) by mouth daily, Disp: 90 tablet, Rfl: 3     MULTI-VITAMIN PO TABS, 1 TABLET DAILY, Disp: , Rfl:      nitroGLYcerin (NITROSTAT) 0.4 MG sublingual tablet, Place 1 tablet (0.4 mg) under the tongue every 5 minutes as needed (Patient not taking: No  sig reported), Disp: 25 tablet, Rfl: 1     spironolactone (ALDACTONE) 25 MG tablet, Take 1 tablet (25 mg) by mouth daily, Disp: 90 tablet, Rfl: 3  Past Medical History:   Diagnosis Date     CAD (coronary artery disease) 10/2013    s/p 3 stents -- NL stress test 12/4/20     Colorectal polyps 08/03/2010    anal polyp removed     COPD (chronic obstructive pulmonary disease) (H) 4/2/2021     FHx: prostate cancer     father     Gout 02/2008     Hyperlipidemia LDL goal < 100 04/2006     Hypertension goal BP (blood pressure) < 140/90 05/2002     NSTEMI (non-ST elevation myocardial infarction) (H) 10/2013     Squamous cell carcinoma 05/2008    RT CHEEK     No data found.  Vitals: There were no vitals taken for this visit.  BMI= There is no height or weight on file to calculate BMI.  Other  male  82 year old     Non-     White

## 2022-09-27 ENCOUNTER — RESEARCH ENCOUNTER (OUTPATIENT)
Dept: RESEARCH | Facility: CLINIC | Age: 82
End: 2022-09-27

## 2022-09-29 ENCOUNTER — TELEPHONE (OUTPATIENT)
Dept: RESEARCH | Facility: CLINIC | Age: 82
End: 2022-09-29
Payer: COMMERCIAL

## 2022-09-29 DIAGNOSIS — Z00.6 RESEARCH SUBJECT: Primary | ICD-10-CM

## 2022-09-29 PROCEDURE — 99207 PR NO CHARGE NURSE ONLY: CPT

## 2022-09-29 NOTE — TELEPHONE ENCOUNTER
[facility]: A multicenter, Cross sectional study to characterize the Distribution of Lipoprotein(a) Levels Among Patients With Documented History of Atherosclerotic Cardiovascular Disease     Samy Henry was called today to review results of Lp(a) drawn on 26Sept2022.    He was informed of the significance of the results as related to upcoming study and their potential participation.          He affirms understanding.    Samy J Carl has no further questions at this time.    Ana Butt RN    Recent Results (from the past 240 hour(s))   Lipoprotein (a)    Collection Time: 09/26/22  8:37 AM   Result Value Ref Range    Lipoprotein (a) 6 <30 mg/dL

## 2022-11-11 ENCOUNTER — TELEPHONE (OUTPATIENT)
Dept: FAMILY MEDICINE | Facility: CLINIC | Age: 82
End: 2022-11-11

## 2022-11-11 NOTE — TELEPHONE ENCOUNTER
Reason for Call:  Form, our goal is to have forms completed with 72 hours, however, some forms may require a visit or additional information.    Type of letter, form or note:  medical    Who is the form from?: Patient    Where did the form come from: Patient or family brought in       What clinic location was the form placed at?: [facility]    Where the form was placed: Dr Bradshaw Box/Folder    What number is listed as a contact on the form? [phone]       Additional comments: call if needed     Call taken on 11/11/2022 at 11:33 AM by Michelle Conklin

## 2022-11-15 ENCOUNTER — TELEPHONE (OUTPATIENT)
Dept: PULMONOLOGY | Facility: CLINIC | Age: 82
End: 2022-11-15

## 2022-11-15 NOTE — TELEPHONE ENCOUNTER
called to reschedule pt informed to please cancel as he doesnt feel he  needs to be seen at this point

## 2022-11-17 ENCOUNTER — OFFICE VISIT (OUTPATIENT)
Dept: FAMILY MEDICINE | Facility: CLINIC | Age: 82
End: 2022-11-17
Payer: COMMERCIAL

## 2022-11-17 VITALS
TEMPERATURE: 97.9 F | DIASTOLIC BLOOD PRESSURE: 73 MMHG | BODY MASS INDEX: 26.2 KG/M2 | HEIGHT: 70 IN | SYSTOLIC BLOOD PRESSURE: 133 MMHG | OXYGEN SATURATION: 96 % | HEART RATE: 83 BPM | WEIGHT: 183 LBS

## 2022-11-17 DIAGNOSIS — I25.10 CORONARY ARTERY DISEASE INVOLVING NATIVE CORONARY ARTERY OF NATIVE HEART WITHOUT ANGINA PECTORIS: ICD-10-CM

## 2022-11-17 DIAGNOSIS — I10 HYPERTENSION GOAL BP (BLOOD PRESSURE) < 140/90: Primary | ICD-10-CM

## 2022-11-17 DIAGNOSIS — M1A.00X0 IDIOPATHIC CHRONIC GOUT WITHOUT TOPHUS, UNSPECIFIED SITE: ICD-10-CM

## 2022-11-17 DIAGNOSIS — J44.9 CHRONIC OBSTRUCTIVE PULMONARY DISEASE, UNSPECIFIED COPD TYPE (H): ICD-10-CM

## 2022-11-17 PROCEDURE — 99214 OFFICE O/P EST MOD 30 MIN: CPT | Performed by: FAMILY MEDICINE

## 2022-11-17 RX ORDER — DILTIAZEM HYDROCHLORIDE 240 MG/1
240 CAPSULE, EXTENDED RELEASE ORAL DAILY
Qty: 30 CAPSULE | Refills: 1 | Status: SHIPPED | OUTPATIENT
Start: 2022-11-17 | End: 2023-01-23

## 2022-11-17 ASSESSMENT — PAIN SCALES - GENERAL: PAINLEVEL: NO PAIN (0)

## 2022-11-17 NOTE — PROGRESS NOTES
Assessment & Plan       ICD-10-CM    1. Hypertension goal BP (blood pressure) < 140/90  I10 diltiazem ER (DILT-XR) 240 MG 24 hr ER beaded capsule      2. Coronary artery disease involving native coronary artery of native heart without angina pectoris  I25.10       3. Idiopathic chronic gout without tophus, unspecified site  [id number].[id number]       4. Chronic obstructive pulmonary disease, unspecified COPD type (H)  J44.9         We discussed that while beta-blockers may be especially helpful and appropriate given his history of MI and CAD, because they exacerbate his COPD and breathing, I think we could reasonably stop them at this point  I would like to try him on diltiazem instead of the carvedilol  We will continue his same lisinopril and spironolactone  Discussed that we could consider adding back in HCTZ and then either monitoring his uric acid levels and potential for gout and/or starting him on allopurinol at the same time as a gout preventative medication    I asked him to continue to monitor home blood pressure readings over the next month and then to send me a [facility]mair message with with how things seem to be going with this regimen at that time    Follow-up in about 1 month (around 12/17/2022) via [facility] for BP review    30-minute visit today including chart review and review of his documentation that he sent to me, the visit itself, and subsequent documentation    Cabrera Bradshaw MD  [facility] ROLLY Alfaro is a 82 year old, presenting for the following health issues:  Forms      History of Present Illness       Hypertension: He presents for follow up of hypertension.  He does check blood pressure  regularly outside of the clinic. Outside blood pressures have been over 140/90. He does not follow a low salt diet.         He sent me a 1 page long letter a week ago detailing information regarding his blood pressure and related issues.  He has been taking lisinopril, carvedilol, and  spironolactone for hypertension.  He does not have shortness of breath at rest, but he does get short of breath with activity quite readily.  He tried stopping the carvedilol and his breathing improved.  He had similar symptoms when he was on metoprolol previously.  He also urinates a fair amount and it is somewhat bothersome.  He has been tried on various other blood pressure medications in the past including metoprolol which caused a low heart rate and breathing issues with his COPD, HCTZ which may have contributed to gout, hydralazine which caused headaches, amlodipine which caused edema, and clonidine which was not very effective.  He only has minimal gout symptoms once or twice a year, so he is not especially concerned about that.  He would rather be off the carvedilol because of his breathing situation.    Home blood pressure readings have been elevated when he has stopped the carvedilol.    Patient Active Problem List   Diagnosis     Colorectal polyps     Hypertension goal BP (blood pressure) < 140/90     Advanced directives, counseling/discussion     Hyperlipidemia LDL goal <100     History of non-ST elevation myocardial infarction (NSTEMI)     FHx: prostate cancer     Essential hypertension with goal blood pressure less than 140/90     Idiopathic chronic gout without tophus, unspecified site     Coronary artery disease involving native coronary artery of native heart without angina pectoris     COPD (chronic obstructive pulmonary disease) (H)     Hyperuricemia     Current Outpatient Medications   Medication     ASPIRIN 81 MG PO TABS     atorvastatin (LIPITOR) 40 MG tablet     carvedilol (COREG) 3.125 MG tablet         lisinopril (ZESTRIL) 40 MG tablet     MULTI-VITAMIN PO TABS     nitroGLYcerin (NITROSTAT) 0.4 MG sublingual tablet     spironolactone (ALDACTONE) 25 MG tablet     No current facility-administered medications for this visit.         Review of Systems   Mainly significant for the above.     "  Objective    /73 (BP Location: Right arm, Patient Position: Sitting, Cuff Size: Adult Regular)   Pulse 83   Temp 97.9  F (36.6  C) (Oral)   Ht 1.788 m (5' 10.4\")   Wt 83 kg (183 lb)   SpO2 96%   BMI 25.96 kg/m    Body mass index is 25.96 kg/m .  Physical Exam   GENERAL: healthy, alert and no distress    We reviewed home blood pressure readings and various notes that he had provided for me regarding blood pressure related issues.            "

## 2022-12-05 NOTE — PROGRESS NOTES
Please see [facility] note on the date of this encounter.    Liberty Brand RN, BSN  Clinical Trials Nurse

## 2022-12-09 ENCOUNTER — MYC MEDICAL ADVICE (OUTPATIENT)
Dept: FAMILY MEDICINE | Facility: CLINIC | Age: 82
End: 2022-12-09

## 2023-01-20 ENCOUNTER — MYC MEDICAL ADVICE (OUTPATIENT)
Dept: FAMILY MEDICINE | Facility: CLINIC | Age: 83
End: 2023-01-20
Payer: COMMERCIAL

## 2023-01-20 DIAGNOSIS — I10 HYPERTENSION GOAL BP (BLOOD PRESSURE) < 140/90: ICD-10-CM

## 2023-01-23 RX ORDER — DILTIAZEM HYDROCHLORIDE 240 MG/1
240 CAPSULE, EXTENDED RELEASE ORAL DAILY
Qty: 90 CAPSULE | Refills: 2 | Status: SHIPPED | OUTPATIENT
Start: 2023-01-23 | End: 2023-08-25

## 2023-04-21 ENCOUNTER — MYC MEDICAL ADVICE (OUTPATIENT)
Dept: FAMILY MEDICINE | Facility: CLINIC | Age: 83
End: 2023-04-21
Payer: COMMERCIAL

## 2023-05-09 ENCOUNTER — OFFICE VISIT (OUTPATIENT)
Dept: FAMILY MEDICINE | Facility: CLINIC | Age: 83
End: 2023-05-09
Payer: COMMERCIAL

## 2023-05-09 VITALS
DIASTOLIC BLOOD PRESSURE: 74 MMHG | BODY MASS INDEX: 26.92 KG/M2 | WEIGHT: 188 LBS | TEMPERATURE: 98.3 F | HEIGHT: 70 IN | SYSTOLIC BLOOD PRESSURE: 147 MMHG | OXYGEN SATURATION: 95 %

## 2023-05-09 DIAGNOSIS — R10.31 BILATERAL LOWER ABDOMINAL DISCOMFORT: Primary | ICD-10-CM

## 2023-05-09 DIAGNOSIS — E79.0 HYPERURICEMIA: ICD-10-CM

## 2023-05-09 DIAGNOSIS — E78.5 HYPERLIPIDEMIA LDL GOAL <100: ICD-10-CM

## 2023-05-09 DIAGNOSIS — J44.9 CHRONIC OBSTRUCTIVE PULMONARY DISEASE, UNSPECIFIED COPD TYPE (H): ICD-10-CM

## 2023-05-09 DIAGNOSIS — L57.0 ACTINIC KERATOSES: ICD-10-CM

## 2023-05-09 DIAGNOSIS — I10 ESSENTIAL HYPERTENSION WITH GOAL BLOOD PRESSURE LESS THAN 140/90: ICD-10-CM

## 2023-05-09 DIAGNOSIS — R10.32 BILATERAL LOWER ABDOMINAL DISCOMFORT: Primary | ICD-10-CM

## 2023-05-09 PROCEDURE — 99214 OFFICE O/P EST MOD 30 MIN: CPT | Mod: 25 | Performed by: FAMILY MEDICINE

## 2023-05-09 PROCEDURE — 17003 DESTRUCT PREMALG LES 2-14: CPT | Performed by: FAMILY MEDICINE

## 2023-05-09 PROCEDURE — 17000 DESTRUCT PREMALG LESION: CPT | Performed by: FAMILY MEDICINE

## 2023-05-09 ASSESSMENT — ENCOUNTER SYMPTOMS: ABDOMINAL PAIN: 1

## 2023-05-09 ASSESSMENT — PAIN SCALES - GENERAL: PAINLEVEL: NO PAIN (1)

## 2023-05-09 NOTE — PROGRESS NOTES
Assessment & Plan       ICD-10-CM    1. Bilateral lower abdominal discomfort  R10.31 CT Abdomen Pelvis w Contrast    R10.32       2. Essential hypertension with goal blood pressure less than 140/90  I10 Basic metabolic panel     CBC with platelets      3. Actinic keratoses  L57.0 DESTRUCT PREMALIGNANT LESION, FIRST     DESTRUCT PREMALIGNANT LESION, 2-14      4. Chronic obstructive pulmonary disease, unspecified COPD type (H)  J44.9       5. Hyperlipidemia LDL goal <100  E78.5 Lipid panel reflex to direct LDL Fasting     ALT      6. 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.0 Uric acid        We discussed options to further evaluate his lower abdominal discomfort and elected to proceed with an abdominal/pelvic CT scan  His blood pressure is mildly elevated today, but its been good at home, so we will continue with the same blood pressure meds  Discussed protecting his skin from further sun damage    We will continue to hold off on a beta-blocker given his history of COPD  Plan a tentative recheck in 3 to 4 months for an annual physical preceded by fasting lab work as ordered above    Cabrera Bradshaw MD  [facility] ROLLY Alfaro is a 83 year old, presenting for the following health issues:  Abdominal Pain        5/9/2023     9:09 AM   Additional Questions   Roomed by cam   Accompanied by none         5/9/2023     9:09 AM   Patient Reported Additional Medications   Patient reports taking the following new medications none     Abdominal Pain     History of Present Illness       Reason for visit:  Abdominal discomfort  Symptom onset:  More than a month  Symptoms include:  Abdominal discomfort  Symptom intensity:  Mild  Symptom progression:  Staying the same  Had these symptoms before:  Yes  Has tried/received treatment for these symptoms:  No  What makes it worse:  No  What makes it better:  No    He eats 2-3 servings of fruits and vegetables daily.He consumes 1 sweetened beverage(s) daily.He exercises with  enough effort to increase his heart rate 30 to 60 minutes per day.  He exercises with enough effort to increase his heart rate 4 days per week.   He is taking medications regularly.     He has been experiencing some mild lower abdominal discomfort over the last month or so.  He feels like pain would be too strong of a word for this.  It is mainly in the suprapubic region.  Sometimes he feels it in the left lower abdomen and sometimes in the right lower abdomen, however.  It seems to be most noticeable in the morning when he first gets up, but then he will often have a bowel movement and urinate and then he feels less pressure there.  He does a lot of physical activities and exercises at [email or web address] and those activities do not really bother him.  He has had a tendency towards constipation at times.  He is not having any dysuria or hematuria or other significant urinary issues.    He remains on blood pressure medication as below.  His average blood pressure reading over the last month or so has been 127/73.  He feels like his baseline medicines are working well.    He does have a skin growth on his upper forehead he wants me to look at.  It is raised and a little crusty.    Patient Active Problem List   Diagnosis    Colorectal polyps    Hypertension goal BP (blood pressure) < 140/90    Advanced directives, counseling/discussion    Hyperlipidemia LDL goal <100    History of non-ST elevation myocardial infarction (NSTEMI)    FHx: prostate cancer    Essential hypertension with goal blood pressure less than 140/90    Idiopathic chronic gout without tophus, unspecified site    Coronary artery disease involving native coronary artery of native heart without angina pectoris    COPD (chronic obstructive pulmonary disease) (H)    Hyperuricemia    Actinic keratoses     Current Outpatient Medications   Medication    ASPIRIN 81 MG PO TABS    atorvastatin (LIPITOR) 40 MG tablet    diltiazem ER (DILT-XR) 240 MG 24 hr ER beaded  "capsule    lisinopril (ZESTRIL) 40 MG tablet    MULTI-VITAMIN PO TABS    nitroGLYcerin (NITROSTAT) 0.4 MG sublingual tablet     No current facility-administered medications for this visit.           Review of Systems   Gastrointestinal:  Positive for abdominal pain.            Objective    BP (!) 147/74   Temp 98.3  F (36.8  C) (Oral)   Ht 1.778 m (5' 10\")   Wt 85.3 kg (188 lb)   SpO2 95%   BMI 26.98 kg/m    Body mass index is 26.98 kg/m .  Physical Exam   GENERAL: healthy, alert and no distress  ABDOMEN: Soft with minimal suprapubic discomfort to deep palpation, no guarding or rebound or mass  SKIN: He has a slightly raised hyperkeratotic lesion about three quarters of a centimeter diameter on the upper mid forehead which looks like an actinic keratosis.  He has 3 other areas of actinic change on the left temple and 1 on the top of his left ear.  After discussion with the patient, these 5 areas were frozen with liquid nitrogen.  No definite skin cancers are seen anywhere.                "

## 2023-05-15 ENCOUNTER — ANCILLARY PROCEDURE (OUTPATIENT)
Dept: CT IMAGING | Facility: CLINIC | Age: 83
End: 2023-05-15
Attending: FAMILY MEDICINE
Payer: COMMERCIAL

## 2023-05-15 DIAGNOSIS — L57.0 ACTINIC KERATOSES: ICD-10-CM

## 2023-05-15 LAB
CREAT BLD-MCNC: 1.1 MG/DL (ref 0.7–1.3)
GFR SERPL CREATININE-BSD FRML MDRD: >60 ML/MIN/1.73M2

## 2023-05-15 PROCEDURE — 82565 ASSAY OF CREATININE: CPT

## 2023-05-15 PROCEDURE — 74177 CT ABD & PELVIS W/CONTRAST: CPT | Mod: TC | Performed by: RADIOLOGY

## 2023-05-15 RX ORDER — IOPAMIDOL 755 MG/ML
103 INJECTION, SOLUTION INTRAVASCULAR ONCE
Status: COMPLETED | OUTPATIENT
Start: 2023-05-15 | End: 2023-05-15

## 2023-05-15 RX ADMIN — IOPAMIDOL 103 ML: 755 INJECTION, SOLUTION INTRAVASCULAR at 08:59

## 2023-05-15 NOTE — RESULT ENCOUNTER NOTE
Dominic,  Good news on the CT scan.  It shows you have an enlarged prostate, which is not new, but no concerning causes or problems in the lower abdominal which could be a source of pain.  I think we could continue to follow this for now and plan to recheck in a few months with your annual physical.  If your symptoms seem to progress in the meantime, you can keep me posted.    Cabrera Bradshaw MD

## 2023-08-21 ASSESSMENT — ENCOUNTER SYMPTOMS
FREQUENCY: 0
MYALGIAS: 0
COUGH: 0
HEMATURIA: 0
JOINT SWELLING: 0
NAUSEA: 0
HEADACHES: 0
CONSTIPATION: 0
NERVOUS/ANXIOUS: 0
ABDOMINAL PAIN: 1
HEMATOCHEZIA: 0
DIARRHEA: 0
WEAKNESS: 0
EYE PAIN: 0
FEVER: 0
SHORTNESS OF BREATH: 1
DYSURIA: 0
PARESTHESIAS: 0
DIZZINESS: 1
SORE THROAT: 0
ARTHRALGIAS: 0
CHILLS: 0
PALPITATIONS: 0
HEARTBURN: 0

## 2023-08-21 ASSESSMENT — ACTIVITIES OF DAILY LIVING (ADL): CURRENT_FUNCTION: NO ASSISTANCE NEEDED

## 2023-08-22 ENCOUNTER — LAB (OUTPATIENT)
Dept: LAB | Facility: CLINIC | Age: 83
End: 2023-08-22
Payer: COMMERCIAL

## 2023-08-22 DIAGNOSIS — E78.5 HYPERLIPIDEMIA LDL GOAL <100: ICD-10-CM

## 2023-08-22 DIAGNOSIS — E79.0 HYPERURICEMIA: ICD-10-CM

## 2023-08-22 DIAGNOSIS — I10 ESSENTIAL HYPERTENSION WITH GOAL BLOOD PRESSURE LESS THAN 140/90: ICD-10-CM

## 2023-08-22 LAB
ALT SERPL W P-5'-P-CCNC: 25 U/L (ref 0–70)
ANION GAP SERPL CALCULATED.3IONS-SCNC: 11 MMOL/L (ref 7–15)
BUN SERPL-MCNC: 13.9 MG/DL (ref 8–23)
CALCIUM SERPL-MCNC: 9.4 MG/DL (ref 8.8–10.2)
CHLORIDE SERPL-SCNC: 105 MMOL/L (ref 98–107)
CHOLEST SERPL-MCNC: 134 MG/DL
CREAT SERPL-MCNC: 0.98 MG/DL (ref 0.67–1.17)
DEPRECATED HCO3 PLAS-SCNC: 25 MMOL/L (ref 22–29)
ERYTHROCYTE [DISTWIDTH] IN BLOOD BY AUTOMATED COUNT: 13.9 % (ref 10–15)
GFR SERPL CREATININE-BSD FRML MDRD: 77 ML/MIN/1.73M2
GLUCOSE SERPL-MCNC: 95 MG/DL (ref 70–99)
HCT VFR BLD AUTO: 44 % (ref 40–53)
HDLC SERPL-MCNC: 57 MG/DL
HGB BLD-MCNC: 15 G/DL (ref 13.3–17.7)
LDLC SERPL CALC-MCNC: 61 MG/DL
MCH RBC QN AUTO: 32 PG (ref 26.5–33)
MCHC RBC AUTO-ENTMCNC: 34.1 G/DL (ref 31.5–36.5)
MCV RBC AUTO: 94 FL (ref 78–100)
NONHDLC SERPL-MCNC: 77 MG/DL
PLATELET # BLD AUTO: 272 10E3/UL (ref 150–450)
POTASSIUM SERPL-SCNC: 4.5 MMOL/L (ref 3.4–5.3)
RBC # BLD AUTO: 4.69 10E6/UL (ref 4.4–5.9)
SODIUM SERPL-SCNC: 141 MMOL/L (ref 136–145)
TRIGL SERPL-MCNC: 80 MG/DL
URATE SERPL-MCNC: 6.7 MG/DL (ref 3.4–7)
WBC # BLD AUTO: 6.9 10E3/UL (ref 4–11)

## 2023-08-22 PROCEDURE — 36415 COLL VENOUS BLD VENIPUNCTURE: CPT

## 2023-08-22 PROCEDURE — 84550 ASSAY OF BLOOD/URIC ACID: CPT

## 2023-08-22 PROCEDURE — 80061 LIPID PANEL: CPT

## 2023-08-22 PROCEDURE — 85027 COMPLETE CBC AUTOMATED: CPT

## 2023-08-22 PROCEDURE — 80048 BASIC METABOLIC PNL TOTAL CA: CPT

## 2023-08-22 PROCEDURE — 84460 ALANINE AMINO (ALT) (SGPT): CPT

## 2023-08-25 ENCOUNTER — OFFICE VISIT (OUTPATIENT)
Dept: FAMILY MEDICINE | Facility: CLINIC | Age: 83
End: 2023-08-25
Payer: COMMERCIAL

## 2023-08-25 VITALS
HEIGHT: 70 IN | WEIGHT: 183 LBS | BODY MASS INDEX: 26.2 KG/M2 | SYSTOLIC BLOOD PRESSURE: 170 MMHG | TEMPERATURE: 97.5 F | HEART RATE: 77 BPM | DIASTOLIC BLOOD PRESSURE: 83 MMHG | OXYGEN SATURATION: 94 %

## 2023-08-25 DIAGNOSIS — J44.9 CHRONIC OBSTRUCTIVE PULMONARY DISEASE, UNSPECIFIED COPD TYPE (H): ICD-10-CM

## 2023-08-25 DIAGNOSIS — E78.5 HYPERLIPIDEMIA LDL GOAL <100: ICD-10-CM

## 2023-08-25 DIAGNOSIS — I25.10 CORONARY ARTERY DISEASE INVOLVING NATIVE CORONARY ARTERY OF NATIVE HEART WITHOUT ANGINA PECTORIS: ICD-10-CM

## 2023-08-25 DIAGNOSIS — Z00.00 ENCOUNTER FOR MEDICARE ANNUAL WELLNESS EXAM: Primary | ICD-10-CM

## 2023-08-25 DIAGNOSIS — I10 ESSENTIAL HYPERTENSION WITH GOAL BLOOD PRESSURE LESS THAN 140/90: ICD-10-CM

## 2023-08-25 DIAGNOSIS — I10 HYPERTENSION GOAL BP (BLOOD PRESSURE) < 140/90: ICD-10-CM

## 2023-08-25 PROCEDURE — G0439 PPPS, SUBSEQ VISIT: HCPCS | Performed by: FAMILY MEDICINE

## 2023-08-25 RX ORDER — LISINOPRIL 40 MG/1
40 TABLET ORAL DAILY
Qty: 90 TABLET | Refills: 3 | Status: SHIPPED | OUTPATIENT
Start: 2023-08-25 | End: 2024-09-04

## 2023-08-25 RX ORDER — ATORVASTATIN CALCIUM 40 MG/1
40 TABLET, FILM COATED ORAL DAILY
Qty: 90 TABLET | Refills: 3 | Status: SHIPPED | OUTPATIENT
Start: 2023-08-25 | End: 2024-09-04

## 2023-08-25 RX ORDER — DILTIAZEM HYDROCHLORIDE 240 MG/1
240 CAPSULE, EXTENDED RELEASE ORAL DAILY
Qty: 90 CAPSULE | Refills: 3 | Status: SHIPPED | OUTPATIENT
Start: 2023-08-25 | End: 2024-04-15 | Stop reason: DRUGHIGH

## 2023-08-25 ASSESSMENT — ENCOUNTER SYMPTOMS
PARESTHESIAS: 0
FEVER: 0
HEMATURIA: 0
ARTHRALGIAS: 0
CONSTIPATION: 0
COUGH: 0
SHORTNESS OF BREATH: 1
DIARRHEA: 0
MYALGIAS: 0
DYSURIA: 0
HEADACHES: 0
FREQUENCY: 0
CHILLS: 0
SORE THROAT: 0
PALPITATIONS: 0
NERVOUS/ANXIOUS: 0
WEAKNESS: 0
HEARTBURN: 0
JOINT SWELLING: 0
EYE PAIN: 0
DIZZINESS: 1
ABDOMINAL PAIN: 1
NAUSEA: 0
HEMATOCHEZIA: 0

## 2023-08-25 ASSESSMENT — PAIN SCALES - GENERAL: PAINLEVEL: NO PAIN (0)

## 2023-08-25 ASSESSMENT — ACTIVITIES OF DAILY LIVING (ADL): CURRENT_FUNCTION: NO ASSISTANCE NEEDED

## 2023-08-25 NOTE — PATIENT INSTRUCTIONS
Patient Education   Personalized Prevention Plan  You are due for the preventive services outlined below.  Your care team is available to assist you in scheduling these services.  If you have already completed any of these items, please share that information with your care team to update in your medical record.  Health Maintenance Due   Topic Date Due     Zoster (Shingles) Vaccine (2 of 3) 07/03/2009     Diptheria Tetanus Pertussis (DTAP/TDAP/TD) Vaccine (1 - Tdap) 07/24/2019     COVID-19 Vaccine (6 - Pfizer series) 03/14/2023     ANNUAL REVIEW OF HM ORDERS  03/30/2023     Annual Wellness Visit  08/24/2023     Flu Vaccine (1) 09/01/2023

## 2023-08-25 NOTE — PROGRESS NOTES
"SUBJECTIVE:   Dominic is a 83 year old who presents for a Preventive Visit and follow-up on baseline health conditions.      8/25/2023     9:00 AM   Additional Questions   Roomed by cam   Accompanied by none         8/25/2023     9:00 AM   Patient Reported Additional Medications   Patient reports taking the following new medications none       Are you in the first 12 months of your Medicare coverage?      Healthy Habits:     In general, how would you rate your overall health?  Good    Frequency of exercise:  2-3 days/week    Duration of exercise:  45-60 minutes    Do you usually eat at least 4 servings of fruit and vegetables a day, include whole grains    & fiber and avoid regularly eating high fat or \"junk\" foods?  Yes    Taking medications regularly:  Yes    Medication side effects:  Other    Ability to successfully perform activities of daily living:  No assistance needed    Home Safety:  No safety concerns identified    Hearing Impairment:  No hearing concerns    In the past 6 months, have you been bothered by leaking of urine?  No    In general, how would you rate your overall mental or emotional health?  Excellent    Additional concerns today:  No        Have you ever done Advance Care Planning? (For example, a Health Directive, POLST, or a discussion with a medical provider or your loved ones about your wishes): No, advance care planning information given to patient to review.  Patient declined advance care planning discussion at this time.       Fall risk  Fallen 2 or more times in the past year?: No  Any fall with injury in the past year?: No    Cognitive Screening   1) Repeat 3 items (Leader, Season, Table)    2) Clock draw: NORMAL  3) 3 item recall: Recalls 3 objects  Results: NORMAL clock, 1-2 items recalled: COGNITIVE IMPAIRMENT LESS LIKELY    Mini-CogTM Copyright JUAN MANUEL Arguello. Licensed by the author for use in [facility]; reprinted with permission (brad@.[facility]). All rights reserved.      Do you " have sleep apnea, excessive snoring or daytime drowsiness? : no    Reviewed and updated as needed this visit by clinical staff    Allergies  Meds              Reviewed and updated as needed this visit by Provider                 Social History     Tobacco Use    Smoking status: Former     Packs/day: 1.00     Types: Cigarettes     Start date: 1959     Quit date: 1974     Years since quittin.6    Smokeless tobacco: Never   Substance Use Topics    Alcohol use: Yes     Comment: 2 glasses of wine per day              2023     9:07 AM   Alcohol Use   Prescreen: >3 drinks/day or >7 drinks/week? No     Do you have a current opioid prescription? No  Do you use any other controlled substances or medications that are not prescribed by a provider? None    He remains on diltiazem and lisinopril for hypertension.  He seems to tolerate those medicines well.  Home blood pressure readings are typically in the 120s systolic.  They rarely go above 140.  He is also on atorvastatin 40 mg daily for hyperlipidemia and low-dose aspirin daily for his history of CAD.  He is not having any angina.  He has a prescription for nitroglycerin, but he never needs to use it.    Current providers sharing in care for this patient include:   Patient Care Team:  Cabrera Bradshaw MD as PCP - General  Cabrera Bradshaw MD as Referring Physician (Family Medicine)  Michele Guillen MD as MD (Internal Medicine)  Cabrera Bradshaw MD as Assigned PCP  Roro Colon MD as MD (Otolaryngology)    The following health maintenance items are reviewed in Epic and correct as of today:  Health Maintenance   Topic Date Due    ZOSTER IMMUNIZATION (2 of 3) 2009    DTAP/TDAP/TD IMMUNIZATION (1 - Tdap) 2019    COVID-19 Vaccine (6 - Pfizer series) 2023    ANNUAL REVIEW OF HM ORDERS  2023    MEDICARE ANNUAL WELLNESS VISIT  2023    INFLUENZA VACCINE (1) 2023    BMP  2024    LIPID  2024    FALL  RISK ASSESSMENT  2024    COLORECTAL CANCER SCREENING  2025    ADVANCE CARE PLANNING  2027    SPIROMETRY  Completed    COPD ACTION PLAN  Completed    PHQ-2 (once per calendar year)  Completed    Pneumococcal Vaccine: 65+ Years  Completed    IPV IMMUNIZATION  Aged Out    MENINGITIS IMMUNIZATION  Aged Out     Patient Active Problem List   Diagnosis    Colorectal polyps    Hypertension goal BP (blood pressure) < 140/90    Advanced directives, counseling/discussion    Hyperlipidemia LDL goal <100    History of non-ST elevation myocardial infarction (NSTEMI)    FHx: prostate cancer    Essential hypertension with goal blood pressure less than 140/90    Idiopathic chronic gout without tophus, unspecified site    Coronary artery disease involving native coronary artery of native heart without angina pectoris    COPD (chronic obstructive pulmonary disease) (H)    Hyperuricemia    Actinic keratoses     Past Surgical History:   Procedure Laterality Date    CATARACT IOL, RT/LT  2009    right    CATARACT IOL, RT/LT      left    COLONOSCOPY      COLONOSCOPY N/A 2015    Procedure: COLONOSCOPY;  Surgeon: Francisco Horowitz MD;  Location: [facility] OR    COLONOSCOPY  2020    COLONOSCOPY WITH CO2 INSUFFLATION N/A 2015    Procedure: COLONOSCOPY WITH CO2 INSUFFLATION;  Surgeon: Francisco Horowitz MD;  Location: [facility] OR    removal of rectal polyp  08/10    STENT, CORONARY, SAMANTA  10/13    3 coronary stents s/p NSTEMI    TONSILLECTOMY & ADENOIDECTOMY      VASECTOMY  79       Social History     Tobacco Use    Smoking status: Former     Packs/day: 1.00     Types: Cigarettes     Start date: 1959     Quit date: 1974     Years since quittin.6    Smokeless tobacco: Never   Substance Use Topics    Alcohol use: Yes     Comment: 2 glasses of wine per day      Family History   Problem Relation Age of Onset    Hypertension Mother     Breast Cancer Mother     Cancer - colorectal Father      Prostate Cancer Father     Cancer Brother         lymphoma    Hypertension Sister     Neurologic Disorder Maternal Grandmother     Cancer Paternal Grandfather          Current Outpatient Medications   Medication Sig Dispense Refill    ASPIRIN 81 MG PO TABS 1 TABLET DAILY      atorvastatin (LIPITOR) 40 MG tablet Take 1 tablet (40 mg) by mouth daily 90 tablet 3    diltiazem ER (DILT-XR) 240 MG 24 hr ER beaded capsule Take 1 capsule (240 mg) by mouth daily 90 capsule 3    lisinopril (ZESTRIL) 40 MG tablet Take 1 tablet (40 mg) by mouth daily 90 tablet 3    MULTI-VITAMIN PO TABS 1 TABLET DAILY      nitroGLYcerin (NITROSTAT) 0.4 MG sublingual tablet Place 1 tablet (0.4 mg) under the tongue every 5 minutes as needed (Patient not taking: Reported on 5/9/2023) 25 tablet 1     No Known Allergies          Review of Systems   Constitutional:  Negative for chills and fever.   HENT:  Negative for congestion, ear pain, hearing loss and sore throat.    Eyes:  Negative for pain and visual disturbance.   Respiratory:  Positive for shortness of breath. Negative for cough.    Cardiovascular:  Positive for peripheral edema. Negative for chest pain and palpitations.   Gastrointestinal:  Positive for abdominal pain. Negative for constipation, diarrhea, heartburn, hematochezia and nausea.   Genitourinary:  Positive for impotence. Negative for dysuria, frequency, genital sores, hematuria, penile discharge and urgency.   Musculoskeletal:  Negative for arthralgias, joint swelling and myalgias.   Skin:  Negative for rash.   Neurological:  Positive for dizziness. Negative for weakness, headaches and paresthesias.   Psychiatric/Behavioral:  Negative for mood changes. The patient is not nervous/anxious.      He still has occasional abdominal discomfort, but it seems to be related to his digestion, and is manageable.  He is able to do most activities that he wants to do without being limited by shortness of breath.    OBJECTIVE:   BP (!)  "170/83 (BP Location: Right arm, Patient Position: Sitting, Cuff Size: Adult Regular)   Pulse 77   Temp 97.5  F (36.4  C) (Oral)   Ht 1.773 m (5' 9.8\")   Wt 83 kg (183 lb)   SpO2 94%   BMI 26.41 kg/m   Estimated body mass index is 26.41 kg/m  as calculated from the following:    Height as of this encounter: 1.773 m (5' 9.8\").    Weight as of this encounter: 83 kg (183 lb).  Physical Exam  GENERAL: healthy, alert and no distress  EYES: Eyes grossly normal to inspection, PERRL and conjunctivae and sclerae normal  HENT: Grossly normal  NECK: no adenopathy, no asymmetry, masses, or scars and thyroid normal to palpation  RESP: lungs clear to auscultation - no rales, rhonchi or wheezes  CV: regular rate and rhythm, normal S1 S2, no S3 or S4, no murmur, click or rub, trace lower extremity peripheral edema and peripheral pulses strong  ABDOMEN: soft, nontender, no hepatosplenomegaly, no masses   MS: no gross musculoskeletal defects noted  SKIN: no suspicious lesions or rashes  NEURO: Normal strength and tone, mentation intact and speech normal  PSYCH: mentation appears normal, affect normal/bright    Diagnostic Test Results:  Labs reviewed in Epic  Lab on 08/22/2023   Component Date Value Ref Range Status    Sodium 08/22/2023 141  136 - 145 mmol/L Final    Potassium 08/22/2023 4.5  3.4 - 5.3 mmol/L Final    Chloride 08/22/2023 105  98 - 107 mmol/L Final    Carbon Dioxide (CO2) 08/22/2023 25  22 - 29 mmol/L Final    Anion Gap 08/22/2023 11  7 - 15 mmol/L Final    Urea Nitrogen 08/22/2023 13.9  8.0 - 23.0 mg/dL Final    Creatinine 08/22/2023 0.98  0.67 - 1.17 mg/dL Final    Calcium 08/22/2023 9.4  8.8 - 10.2 mg/dL Final    Glucose 08/22/2023 95  70 - 99 mg/dL Final    GFR Estimate 08/22/2023 77  >60 mL/min/1.73m2 Final    Cholesterol 08/22/2023 134  <200 mg/dL Final    Triglycerides 08/22/2023 80  <150 mg/dL Final    Direct Measure HDL 08/22/2023 57  >=40 mg/dL Final    LDL Cholesterol Calculated 08/22/2023 61  <=100 " mg/dL Final    Non HDL Cholesterol 08/22/2023 77  <130 mg/dL Final    ALT 08/22/2023 25  0 - 70 U/L Final    Reference intervals for this test were updated on 6/12/2023 to more accurately reflect our healthy population. There may be differences in the flagging of prior results with similar values performed with this method. Interpretation of those prior results can be made in the context of the updated reference intervals.      WBC Count 08/22/2023 6.9  4.0 - 11.0 10e3/uL Final    RBC Count 08/22/2023 4.69  4.40 - 5.90 10e6/uL Final    Hemoglobin 08/22/2023 15.0  13.3 - 17.7 g/dL Final    Hematocrit 08/22/2023 44.0  40.0 - 53.0 % Final    MCV 08/22/2023 94  78 - 100 fL Final    MCH 08/22/2023 32.0  26.5 - 33.0 pg Final    MCHC 08/22/2023 34.1  31.5 - 36.5 g/dL Final    RDW 08/22/2023 13.9  10.0 - 15.0 % Final    Platelet Count 08/22/2023 272  150 - 450 10e3/uL Final    Uric Acid 08/22/2023 6.7  3.4 - 7.0 mg/dL Final         ASSESSMENT / PLAN:       ICD-10-CM    1. Encounter for Medicare annual wellness exam  Z00.00       2. Hyperlipidemia LDL goal <100  E78.5 atorvastatin (LIPITOR) 40 MG tablet      3. Hypertension goal BP (blood pressure) < 140/90  I10 diltiazem ER (DILT-XR) 240 MG 24 hr ER beaded capsule      4. Essential hypertension with goal blood pressure less than 140/90  I10 lisinopril (ZESTRIL) 40 MG tablet      5. Chronic obstructive pulmonary disease, unspecified COPD type (H)  J44.9       6. Coronary artery disease involving native coronary artery of native heart without angina pectoris  I25.10         His lab results are all nice and normal  His blood pressure is elevated here today, but has been doing well at home, so we elected to continue with his same blood pressure meds and doses for now  Continue same atorvastatin and low-dose aspirin as well  He was encouraged to remain physically active  Discussed routine vaccines for the fall including a flu shot, updated COVID booster, and possibly an RSV  "vaccine  Plan a tentative recheck in 1 year, or sooner prn      COUNSELING:  Reviewed preventive health counseling, as reflected in patient instructions       Regular exercise       Healthy diet/nutrition      BMI:   Estimated body mass index is 26.41 kg/m  as calculated from the following:    Height as of this encounter: 1.773 m (5' 9.8\").    Weight as of this encounter: 83 kg (183 lb).         He reports that he quit smoking about 49 years ago. His smoking use included cigarettes. He started smoking about 64 years ago. He smoked an average of 1 pack per day. He has never used smokeless tobacco.      Appropriate preventive services were discussed with this patient, including applicable screening as appropriate for cardiovascular disease, diabetes, osteopenia/osteoporosis, and glaucoma.  As appropriate for age/gender, discussed screening for colorectal cancer, prostate cancer, breast cancer, and cervical cancer. Checklist reviewing preventive services available has been given to the patient.    Reviewed patients plan of care and provided an AVS. The Basic Care Plan (routine screening as documented in Health Maintenance) for Samy meets the Care Plan requirement. This Care Plan has been established and reviewed with the Patient.          Cabrera Bradshaw MD  [facility]        "

## 2023-10-13 ENCOUNTER — MYC MEDICAL ADVICE (OUTPATIENT)
Dept: FAMILY MEDICINE | Facility: CLINIC | Age: 83
End: 2023-10-13
Payer: COMMERCIAL

## 2023-10-13 NOTE — TELEPHONE ENCOUNTER
Abd pain was mentioned during 8/25/23 OV.  Will route to provider to advise    Margarita Carter RN  [facility]

## 2023-10-20 ENCOUNTER — OFFICE VISIT (OUTPATIENT)
Dept: FAMILY MEDICINE | Facility: CLINIC | Age: 83
End: 2023-10-20
Payer: COMMERCIAL

## 2023-10-20 VITALS
SYSTOLIC BLOOD PRESSURE: 158 MMHG | OXYGEN SATURATION: 98 % | WEIGHT: 186 LBS | BODY MASS INDEX: 26.63 KG/M2 | HEIGHT: 70 IN | DIASTOLIC BLOOD PRESSURE: 78 MMHG | HEART RATE: 68 BPM | TEMPERATURE: 98 F

## 2023-10-20 DIAGNOSIS — Z12.5 SCREENING FOR PROSTATE CANCER: ICD-10-CM

## 2023-10-20 DIAGNOSIS — R10.2 SUPRAPUBIC DISCOMFORT: Primary | ICD-10-CM

## 2023-10-20 DIAGNOSIS — Z80.42 FHX: PROSTATE CANCER: ICD-10-CM

## 2023-10-20 DIAGNOSIS — I10 ESSENTIAL HYPERTENSION WITH GOAL BLOOD PRESSURE LESS THAN 140/90: ICD-10-CM

## 2023-10-20 DIAGNOSIS — R35.0 BENIGN PROSTATIC HYPERPLASIA WITH URINARY FREQUENCY: ICD-10-CM

## 2023-10-20 DIAGNOSIS — K40.90 LEFT INGUINAL HERNIA: ICD-10-CM

## 2023-10-20 DIAGNOSIS — N40.1 BENIGN PROSTATIC HYPERPLASIA WITH URINARY FREQUENCY: ICD-10-CM

## 2023-10-20 LAB — PSA SERPL DL<=0.01 NG/ML-MCNC: 5.98 NG/ML

## 2023-10-20 PROCEDURE — 36415 COLL VENOUS BLD VENIPUNCTURE: CPT | Performed by: FAMILY MEDICINE

## 2023-10-20 PROCEDURE — G0103 PSA SCREENING: HCPCS | Performed by: FAMILY MEDICINE

## 2023-10-20 PROCEDURE — 99214 OFFICE O/P EST MOD 30 MIN: CPT | Performed by: FAMILY MEDICINE

## 2023-10-20 RX ORDER — SPIRONOLACTONE 25 MG/1
25 TABLET ORAL DAILY
COMMUNITY
Start: 2023-10-20 | End: 2024-02-05

## 2023-10-20 ASSESSMENT — PAIN SCALES - GENERAL: PAINLEVEL: MILD PAIN (2)

## 2023-10-20 NOTE — PROGRESS NOTES
"  {PROVIDER CHARTING PREFERENCE:691146}    Subjective   Dominic is a 83 year old, presenting for the following health issues:  No chief complaint on file.        10/20/2023    10:50 AM   Additional Questions   Roomed by cam   Accompanied by nonw         10/20/2023    10:50 AM   Patient Reported Additional Medications   Patient reports taking the following new medications none       History of Present Illness       Reason for visit:  Lower abdominal discomfort    He eats 2-3 servings of fruits and vegetables daily.He consumes 1 sweetened beverage(s) daily.He exercises with enough effort to increase his heart rate 30 to 60 minutes per day.  He exercises with enough effort to increase his heart rate 4 days per week.   He is taking medications regularly.       {MA/LPN/RN Pre-Provider Visit Orders- hCG/UA/Strep (Optional):239551}    {additonal problems for provider to add (Optional):815551}      Review of Systems   {ROS COMP (Optional):009774}      Objective    BP (!) 147/71 (BP Location: Left arm, Patient Position: Sitting, Cuff Size: Adult Regular)   Pulse 68   Temp 98  F (36.7  C) (Oral)   Ht 1.773 m (5' 9.8\")   Wt 84.4 kg (186 lb)   SpO2 98%   BMI 26.84 kg/m    Body mass index is 26.84 kg/m .  Physical Exam   {Exam List (Optional):209175}    {Diagnostic Test Results (Optional):611325}    {AMBULATORY ATTESTATION (Optional):619985}              "

## 2023-10-20 NOTE — PROGRESS NOTES
Assessment & Plan       ICD-10-CM    1. Suprapubic discomfort  R10.2       2. Left inguinal hernia  K40.90       3. Screening for prostate cancer  Z12.5 PSA, screen     PSA, screen      4. FHx: prostate cancer  Z80.42       5. Benign prostatic hyperplasia with urinary frequency  N40.1     R35.0       6. Essential hypertension with goal blood pressure less than 140/90  I10         I suspect his lower abdominal/suprapubic discomfort is related to a left inguinal hernia  We discussed this in some detail  His symptoms are fairly minimal at this point, so we will likely just observe this for now, although I did offer him consultation with general surgery at any point in the future  Given his enlarged prostate on the CT scan and his family history of prostate cancer and his current symptoms, we elected to check a PSA today  Given his elevated blood pressure readings, we will add spironolactone 25 mg daily to his blood pressure treatment regimen    Plan a tentative recheck in a couple of months or so       Cabrera Bradshaw MD  [facility]    Jayshree Alfaro is a 83 year old, presenting for the following health issues:  RECHECK (Follow up abd discomfort)      10/20/2023    10:57 AM   Additional Questions   Roomed by cam   Accompanied by none         10/20/2023    10:57 AM   Patient Reported Additional Medications   Patient reports taking the following new medications none       History of Present Illness       Reason for visit:  Lower abdominal discomfort    He eats 2-3 servings of fruits and vegetables daily.He consumes 1 sweetened beverage(s) daily.He exercises with enough effort to increase his heart rate 30 to 60 minutes per day.  He exercises with enough effort to increase his heart rate 4 days per week.   He is taking medications regularly.     He has been having some lower abdominal/suprapubic discomfort now for a number of months, basically since this past spring.  It is always a low-grade  "discomfort, perhaps 2 out of 10.  It never gets severe.  More recently he has noticed a little bit of a bulge in the left groin area and some achiness there.  We did a CT scan back in the spring which showed an enlarged prostate, but no other pathology.  He does have some urinary frequency, but no new changes in his urinary symptoms.  He does have a family history of prostate cancer.    He also notes that his blood pressure has been running a bit high at home, generally in the 130s to 140s.  It was elevated today as well.      Patient Active Problem List   Diagnosis    Colorectal polyps    Hypertension goal BP (blood pressure) < 140/90    Advanced directives, counseling/discussion    Hyperlipidemia LDL goal <100    History of non-ST elevation myocardial infarction (NSTEMI)    FHx: prostate cancer    Essential hypertension with goal blood pressure less than 140/90    Idiopathic chronic gout without tophus, unspecified site    Coronary artery disease involving native coronary artery of native heart without angina pectoris    COPD (chronic obstructive pulmonary disease) (H)    Hyperuricemia    Actinic keratoses     Current Outpatient Medications   Medication    ASPIRIN 81 MG PO TABS    atorvastatin (LIPITOR) 40 MG tablet    diltiazem ER (DILT-XR) 240 MG 24 hr ER beaded capsule    lisinopril (ZESTRIL) 40 MG tablet    MULTI-VITAMIN PO TABS    nitroGLYcerin (NITROSTAT) 0.4 MG sublingual tablet     No current facility-administered medications for this visit.           Review of Systems   Noncontributory except as above.      Objective    BP (!) 147/71 (BP Location: Left arm, Patient Position: Sitting, Cuff Size: Adult Regular)   Pulse 68   Temp 98  F (36.7  C) (Oral)   Ht 1.773 m (5' 9.8\")   Wt 84.4 kg (186 lb)   SpO2 98%   BMI 26.84 kg/m    Body mass index is 26.84 kg/m .  Physical Exam   GENERAL: healthy, alert and no distress  ABDOMEN: soft, nontender, no hepatosplenomegaly, no masses   (male): normal male " genitalia without lesions or urethral discharge, he does seem to have a small reducible left inguinal hernia and a questionable small right inguinal hernia as well      Previous CT scan results and lab results were reviewed

## 2023-10-23 NOTE — RESULT ENCOUNTER NOTE
Dominic,   Your PSA looks as expected. Nothing especially alarming or concerning here.    Cabrera Bradshaw MD

## 2024-02-05 ENCOUNTER — OFFICE VISIT (OUTPATIENT)
Dept: FAMILY MEDICINE | Facility: CLINIC | Age: 84
End: 2024-02-05
Payer: COMMERCIAL

## 2024-02-05 VITALS
BODY MASS INDEX: 26.77 KG/M2 | HEART RATE: 64 BPM | RESPIRATION RATE: 18 BRPM | OXYGEN SATURATION: 97 % | TEMPERATURE: 97.4 F | WEIGHT: 187 LBS | DIASTOLIC BLOOD PRESSURE: 89 MMHG | SYSTOLIC BLOOD PRESSURE: 164 MMHG | HEIGHT: 70 IN

## 2024-02-05 DIAGNOSIS — K40.90 LEFT INGUINAL HERNIA: Primary | ICD-10-CM

## 2024-02-05 DIAGNOSIS — I10 ESSENTIAL HYPERTENSION WITH GOAL BLOOD PRESSURE LESS THAN 140/90: Primary | ICD-10-CM

## 2024-02-05 DIAGNOSIS — I10 ESSENTIAL HYPERTENSION WITH GOAL BLOOD PRESSURE LESS THAN 140/90: ICD-10-CM

## 2024-02-05 PROCEDURE — 99214 OFFICE O/P EST MOD 30 MIN: CPT | Performed by: FAMILY MEDICINE

## 2024-02-05 RX ORDER — SPIRONOLACTONE 25 MG/1
25 TABLET ORAL DAILY
Qty: 90 TABLET | Refills: 0 | Status: SHIPPED | OUTPATIENT
Start: 2024-02-05 | End: 2024-04-15

## 2024-02-05 NOTE — PROGRESS NOTES
"  Assessment & Plan   Problem List Items Addressed This Visit       Essential hypertension with goal blood pressure less than 140/90     Other Visit Diagnoses       Left inguinal hernia    -  Primary    Relevant Orders    Adult General Surg Referral           Continue on spironolactone (restarted 2 days ago)    I did ask MTM to review his medications    Subjective   Dominic is a 83 year old, presenting for the following health issues:  RECHECK (Hernia)  /79 at home today  He restarted spironolactone 2 days ago      2/5/2024    11:01 AM   Additional Questions   Roomed by Rosenda KRUSE CMA     History of Present Illness       Reason for visit:  Dr Bradshaw requested that Ireturn in a few months to review my hernia condition.    He eats 2-3 servings of fruits and vegetables daily.He consumes 1 sweetened beverage(s) daily.He exercises with enough effort to increase his heart rate 10 to 19 minutes per day.  He exercises with enough effort to increase his heart rate 3 or less days per week.   He is taking medications regularly.           Objective    BP (!) 164/89 (BP Location: Right arm, Patient Position: Chair, Cuff Size: Adult Regular)   Pulse 64   Temp 97.4  F (36.3  C) (Temporal)   Resp 18   Ht 1.773 m (5' 9.8\")   Wt 84.8 kg (187 lb)   SpO2 97%   BMI 26.99 kg/m    Body mass index is 26.99 kg/m .  Physical Exam  Constitutional:       Appearance: Normal appearance. He is not ill-appearing.   Genitourinary:      Neurological:      Mental Status: He is alert.        No edema            Signed Electronically by: BECKY IGLESIAS DO    "

## 2024-02-29 ENCOUNTER — MYC MEDICAL ADVICE (OUTPATIENT)
Dept: FAMILY MEDICINE | Facility: CLINIC | Age: 84
End: 2024-02-29
Payer: COMMERCIAL

## 2024-02-29 DIAGNOSIS — I25.10 CORONARY ARTERY DISEASE INVOLVING NATIVE CORONARY ARTERY OF NATIVE HEART WITHOUT ANGINA PECTORIS: Primary | ICD-10-CM

## 2024-03-06 ENCOUNTER — OFFICE VISIT (OUTPATIENT)
Dept: PHARMACY | Facility: CLINIC | Age: 84
End: 2024-03-06
Attending: FAMILY MEDICINE
Payer: COMMERCIAL

## 2024-03-06 VITALS — SYSTOLIC BLOOD PRESSURE: 120 MMHG | DIASTOLIC BLOOD PRESSURE: 74 MMHG | BODY MASS INDEX: 27.13 KG/M2 | WEIGHT: 188 LBS

## 2024-03-06 DIAGNOSIS — E78.5 HYPERLIPIDEMIA LDL GOAL <100: ICD-10-CM

## 2024-03-06 DIAGNOSIS — I10 ESSENTIAL HYPERTENSION WITH GOAL BLOOD PRESSURE LESS THAN 140/90: Primary | ICD-10-CM

## 2024-03-06 DIAGNOSIS — Z78.9 TAKES DIETARY SUPPLEMENTS: ICD-10-CM

## 2024-03-06 DIAGNOSIS — I25.10 CORONARY ARTERY DISEASE INVOLVING NATIVE CORONARY ARTERY OF NATIVE HEART WITHOUT ANGINA PECTORIS: ICD-10-CM

## 2024-03-06 PROCEDURE — 99607 MTMS BY PHARM ADDL 15 MIN: CPT | Performed by: PHARMACIST

## 2024-03-06 PROCEDURE — 99605 MTMS BY PHARM NP 15 MIN: CPT | Performed by: PHARMACIST

## 2024-03-06 NOTE — PROGRESS NOTES
Medication Therapy Management (MTM) Encounter    ASSESSMENT:                            Medication Adherence/Access: No issues identified    Hypertension /CAD, 3 stents: Discussed the effect of beta blockers should not be persisting since he isn't currently on any of them, if there is any current shortness of breath concerns there could be either some underlying lung (could trial albuterol inhaler, has never been on) or heart condition he can be further evaluated for. Has had some low pulse readings per home monitor, he will discuss this more with cardiology, could possibly also be related. Can consider reducing spironolactone to see if still see benefit from blood pressure without quite as much urination.    Hyperlipidemia Stable.    Supplements Stable.     PLAN:                            Decrease spironolactone to 12.5 mg daily and follow-up with cardiology, as planned.     Follow-up: Return in about 6 weeks (around 4/17/2024) for Medication Therapy Management.    SUBJECTIVE/OBJECTIVE:                          Dominic Henry is a 84 year old male coming in for an initial visit. He was referred to me from Dr. Jaffe for difficulty with BP, COPD medication interaction.      Reason for visit: medication question, medication induced asthma.    Allergies/ADRs: Reviewed in chart  Past Medical History: Reviewed in chart  Tobacco: He reports that he quit smoking about 50 years ago. His smoking use included cigarettes. He started smoking about 65 years ago. He smoked an average of 1 pack per day. He has been exposed to tobacco smoke. He has never used smokeless tobacco.  Alcohol: wine with dinner every night    Medication Adherence/Access:   Patient takes medications directly from bottles.  Patient takes medications 3 time(s) per day. -takes diltiazem by itself at 5 am.  Per patient, misses medication 0 times per week.   The patient fills medications at [facility]: NO, fills medications at [facility].    Hypertension  /CAD, 3 stents:  Aspirin 81 mg daily with food  Diltiazem  mg daily (5 am)  Lisinopril 40 mg daily   Spironolactone 25 mg daily - he did trial off this due to increased urination and blood pressure went up, has since restarted, is up multiple times a night now to urinate again    Medication History:  Beta blockers (-olol drugs)contribute to shortness of breath, there was also some overlap with inhalers that had long-acting beta agonists in them which may have also exacerbated this.     Currently notes some very slight distressed breathing when walking up hill carrying something, it goes away immediately when he stops, otherwise no real symptoms.   Has previously seen pulmonology, possibly COPD, however Dominic reports he didn't think he did an accurate test.   He has also previously seen cardiology but it has been many years, he plans to re-establish with cardiology and has an appointment coming up for this.    Patient reports the following medication side effects: dizziness throughout the day  Patient self monitors blood pressure.  Home BP monitoring 108-130/60-70, pulse 47-72 .       BP Readings from Last 3 Encounters:   03/06/24 120/74   02/05/24 (!) 164/89   10/20/23 (!) 158/78     Pulse Readings from Last 3 Encounters:   02/05/24 64   10/20/23 68   08/25/23 77     Hyperlipidemia   Atorvastatin 40 mg daily     Patient reports no significant myalgias or other side effects.     Recent Labs   Lab Test 08/22/23  0844 08/22/22  0745   CHOL 134 124   HDL 57 50   LDL 61 58   TRIG 80 81     Supplements   Multivitamin daily    No reported issues at this time.      Today's Vitals: /74   Wt 188 lb (85.3 kg)   BMI 27.13 kg/m    ----------------      I spent 40 minutes with this patient today. All changes were made via collaborative practice agreement with Cabrera Bradshaw MD. A copy of the visit note was provided to the patient's provider(s).    A summary of these recommendations was given to the patient and was  sent via [employer].    Heather Frazier, PharmD  Medication Therapy Management Pharmacist  [phone]       Medication Therapy Recommendations  Hypertension goal BP (blood pressure) < 140/90    Current Medication: spironolactone (ALDACTONE) 25 MG tablet   Rationale: Dose too high - Dosage too high - Safety   Recommendation: Decrease Dose   Status: Accepted per CPA

## 2024-03-06 NOTE — LETTER
_  Medication List        Prepared on: 03/06/2024     Bring your Medication List when you go to the doctor, hospital, or   emergency room. And, share it with your family or caregivers.     Note any changes to how you take your medications.  Cross out medications when you no longer use them.    Medication How I take it Why I use it Prescriber   ASPIRIN 81 MG PO TABS 1 TABLET DAILY  General Health   Patient Reported   atorvastatin (LIPITOR) 40 MG tablet Take 1 tablet (40 mg) by mouth daily Hyperlipidemia LDL Goal <100 Cabrera Bradshaw MD   diltiazem ER (DILT-XR) 240 MG 24 hr ER beaded capsule Take 1 capsule (240 mg) by mouth daily Hypertension Goal BP (Blood Pressure) < 140/90 Cabrera Bradshaw MD   lisinopril (ZESTRIL) 40 MG tablet Take 1 tablet (40 mg) by mouth daily Essential hypertension with goal blood pressure less than 140/90 Cabrera Bradshaw MD   MULTI-VITAMIN PO TABS 1 TABLET DAILY  General Health   Patient Reported   nitroGLYcerin (NITROSTAT) 0.4 MG sublingual tablet Place 1 tablet (0.4 mg) under the tongue every 5 minutes as needed Coronary artery disease involving native coronary artery of native heart without angina pectoris Cabrera Bradshaw MD   spironolactone (ALDACTONE) 25 MG tablet Take 1 tablet (25 mg) by mouth daily Essential hypertension with goal blood pressure less than 140/90 Hector Jaffe, DO         Add new medications, over-the-counter drugs, herbals, vitamins, or  minerals in the blank rows below.    Medication How I take it Why I use it Prescriber                                      Allergies:      No Known Allergies        Side effects I have had:               Other Information:              My notes and questions:

## 2024-03-06 NOTE — LETTER
March 6, 2024  Samy Henry  [address] [address]    Dear Mr. Henry, [facility] [place]     Thank you for talking with me on Mar 6, 2024 about your health and medications. As a follow-up to our conversation, I have included two documents:      Your Recommended To-Do List has steps you should take to get the best results from your medications.  Your Medication List will help you keep track of your medications and how to take them.    If you want to talk about these documents, please call Dannielle Frazier RPH at phone: [phone], Monday-Friday 8-4:30pm.    I look forward to working with you and your doctors to make sure your medications work well for you.    Sincerely,  Dannielle Frazier RPH  [facility] Pharmacist, [facility]

## 2024-03-06 NOTE — LETTER
"Recommended To-Do List      Prepared on: 03/06/2024       You can get the best results from your medications by completing the items on this \"To-Do List.\"      Bring your To-Do List when you go to your doctor. And, share it with your family or caregivers.    My To-Do List:  What we talked about: What I should do:   Your medication dosage being too high    Decrease your dosage of spironolactone (ALDACTONE)          What we talked about: What I should do:                     "

## 2024-03-06 NOTE — PATIENT INSTRUCTIONS
"Recommendations from today's MTM visit:                                                         Decrease spironolactone to 12.5 mg daily and follow-up with cardiology, as planned.     Follow-up: Return in about 6 weeks (around 4/17/2024) for Medication Therapy Management.    It was great speaking with you today.  I value your experience and would be very thankful for your time in providing feedback in our clinic survey. In the next few days, you may receive an email or text message from [employer] with a link to a survey related to your  clinical pharmacist.\"     To schedule another MTM appointment, please call the clinic directly or you may call the MTM scheduling line at [phone] or toll-free at [phone].     My Clinical Pharmacist's contact information:                                                      Please feel free to contact me with any questions or concerns you have.      Heather Frazier, PharmD  Medication Therapy Management Pharmacist  [phone]     "

## 2024-03-06 NOTE — Clinical Note
RISSA OSULLIVAN note, thanks!  Heather Frazier, PharmD Medication Therapy Management Pharmacist [phone]

## 2024-03-11 ENCOUNTER — TELEPHONE (OUTPATIENT)
Dept: SURGERY | Facility: CLINIC | Age: 84
End: 2024-03-11

## 2024-03-11 ENCOUNTER — OFFICE VISIT (OUTPATIENT)
Dept: SURGERY | Facility: CLINIC | Age: 84
End: 2024-03-11
Payer: COMMERCIAL

## 2024-03-11 VITALS
HEART RATE: 72 BPM | DIASTOLIC BLOOD PRESSURE: 83 MMHG | WEIGHT: 186 LBS | BODY MASS INDEX: 26.84 KG/M2 | SYSTOLIC BLOOD PRESSURE: 145 MMHG

## 2024-03-11 DIAGNOSIS — K40.90 LEFT INGUINAL HERNIA: Primary | ICD-10-CM

## 2024-03-11 PROCEDURE — 99203 OFFICE O/P NEW LOW 30 MIN: CPT | Performed by: SURGERY

## 2024-03-11 NOTE — LETTER
3/11/2024         RE: Samy Henry  [address] [facility] [place] [address]        Dear Colleague,    Thank you for referring your patient, Samy Henry, to the [facility]. Please see a copy of my visit note below.    Patient seen in consultation for left inguinal hernia by Cabrera Bradshaw    HPI:  Patient is a 84 year old male  with complaints of some discomfort and pain and bulge in left groin  The patient noticed the symptoms about 9 months ago.  Started with the discomfort, bulge noticed about 3 months ago.  Can reduce/flatten out on its own.  Right side feels normal  No previous repair  nothing makes the episode better.  Patient has not family history of hernia problems    Review Of Systems    Skin: negative  Ears/Nose/Throat: negative  Respiratory: COPD vs medication side effect  Cardiovascular: history of stents and NSTEMI  Gastrointestinal: negative  Genitourinary: negative  Musculoskeletal: as above  Neurologic: negative  Hematologic/Lymphatic/Immunologic: negative  Endocrine: negative      Past Medical History:   Diagnosis Date     CAD (coronary artery disease) 10/2013    s/p 3 stents -- NL stress test 12/4/20     Colorectal polyps 08/03/2010    anal polyp removed     COPD (chronic obstructive pulmonary disease) (H) 4/2/2021     FHx: prostate cancer     father     Gout 02/2008     Hyperlipidemia LDL goal < 100 04/2006     Hypertension goal BP (blood pressure) < 140/90 05/2002     NSTEMI (non-ST elevation myocardial infarction) (H) 10/2013     Squamous cell carcinoma 05/2008    RT CHEEK       Past Surgical History:   Procedure Laterality Date     CATARACT IOL, RT/LT  06/2009    right     CATARACT IOL, RT/LT  08/13    left     COLONOSCOPY       COLONOSCOPY N/A 8/31/2015    Procedure: COLONOSCOPY;  Surgeon: Francisco Horowitz MD;  Location: [facility] OR     COLONOSCOPY  09/29/2020     COLONOSCOPY WITH CO2 INSUFFLATION N/A 8/31/2015    Procedure: COLONOSCOPY WITH CO2  INSUFFLATION;  Surgeon: Francisco Horowitz MD;  Location: [facility] OR     removal of rectal polyp  08/10     STENT, CORONARY, SAMANTA  10/13    3 coronary stents s/p NSTEMI     TONSILLECTOMY & ADENOIDECTOMY       VASECTOMY  79       Social History     Socioeconomic History     Marital status:      Spouse name: Not on file     Number of children: 2     Years of education: Not on file     Highest education level: Not on file   Occupational History     Employer: [employer]   Tobacco Use     Smoking status: Former     Packs/day: 1     Types: Cigarettes     Start date: 1959     Quit date: 1974     Years since quittin.2     Passive exposure: Past     Smokeless tobacco: Never   Vaping Use     Vaping Use: Never used   Substance and Sexual Activity     Alcohol use: Yes     Comment: 2 glasses of wine per day      Drug use: No     Sexual activity: Never     Partners: Female   Other Topics Concern     Parent/sibling w/ CABG, MI or angioplasty before 65F 55M? No   Social History Narrative     Not on file     Social Determinants of Health     Financial Resource Strain: Low Risk  (2024)    Financial Resource Strain      Within the past 12 months, have you or your family members you live with been unable to get utilities (heat, electricity) when it was really needed?: No   Food Insecurity: Low Risk  (2024)    Food Insecurity      Within the past 12 months, did you worry that your food would run out before you got money to buy more?: No      Within the past 12 months, did the food you bought just not last and you didn t have money to get more?: No   Transportation Needs: Low Risk  (2024)    Transportation Needs      Within the past 12 months, has lack of transportation kept you from medical appointments, getting your medicines, non-medical meetings or appointments, work, or from getting things that you need?: No   Physical Activity: Not on file   Stress: Not on file   Social Connections: Not on file    Interpersonal Safety: Low Risk  (10/20/2023)    Interpersonal Safety      Do you feel physically and emotionally safe where you currently live?: Yes      Within the past 12 months, have you been hit, slapped, kicked or otherwise physically hurt by someone?: No      Within the past 12 months, have you been humiliated or emotionally abused in other ways by your partner or ex-partner?: No   Housing Stability: Low Risk  (1/29/2024)    Housing Stability      Do you have housing? : Yes      Are you worried about losing your housing?: No       Current Outpatient Medications   Medication Sig Dispense Refill     ASPIRIN 81 MG PO TABS 1 TABLET DAILY       atorvastatin (LIPITOR) 40 MG tablet Take 1 tablet (40 mg) by mouth daily 90 tablet 3     diltiazem ER (DILT-XR) 240 MG 24 hr ER beaded capsule Take 1 capsule (240 mg) by mouth daily 90 capsule 3     lisinopril (ZESTRIL) 40 MG tablet Take 1 tablet (40 mg) by mouth daily 90 tablet 3     MULTI-VITAMIN PO TABS 1 TABLET DAILY       nitroGLYcerin (NITROSTAT) 0.4 MG sublingual tablet Place 1 tablet (0.4 mg) under the tongue every 5 minutes as needed (Patient not taking: Reported on 3/6/2024) 25 tablet 1     spironolactone (ALDACTONE) 25 MG tablet Take 1 tablet (25 mg) by mouth daily (Patient taking differently: Take 12.5 mg by mouth daily) 90 tablet 0       Medications and history reviewed    Physical exam:  Vitals: BP (!) 145/83   Pulse 72   Wt 84.4 kg (186 lb)   BMI 26.84 kg/m    BMI= Body mass index is 26.84 kg/m .    Constitutional: healthy, alert, and no distress  Head: Normocephalic. No masses, lesions, tenderness or abnormalities  Cardiovascular: negative, PMI normal. No lifts, heaves, or thrills. RRR. No murmurs, clicks gallops or rub  Respiratory: negative, Percussion normal. Good diaphragmatic excursion. Lungs clear  Gastrointestinal: Abdomen soft, non-tender. BS normal. No masses, organomegaly  : male positive for reducible left inguinal hernia. Possible cord  lipoma on right (subtle lump in canal no impulse or change with cough/palpation)  Musculoskeletal: extremities normal- no gross deformities noted, gait normal, and normal muscle tone  Skin: no suspicious lesions or rashes  Psychiatric: mentation appears normal and affect normal/bright  Hematologic/Lymphatic/Immunologic: Normal cervical lymph nodes  Patient able to get up on table without difficulty.      Assessment:     ICD-10-CM    1. Left inguinal hernia  K40.90 Case Request: HERNIORRHAPHY, INGUINAL, ROBOT-ASSISTED, LAPAROSCOPIC, USING DA CARLOS ALBERTO XI left possible bilateral        Plan: Symptomatic reducible left inguinal hernia with possible asymptomatic cord lipoma versus smaller hernia on the right.  Offered a minimally invasive robotic approach to repair.  Would be able to look over on the right at time of surgery to determine if hernia present and if so could fix as well.  Risks of surgery discussed including, but not limited to bleeding, infection, recurrence, damage to intra-abdominal contents such as nerves, blood vessels, bowel or other organs.  Risks of anesthesia also discussed.  Although mesh is a better long term repair if it gets infected it must be removed. Mesh problems such as fracture, erosion or adhesions are possible.  If there is evidence of an infection at time of surgery it will be cancelled and rescheduled to when well.    Discussed massaging hernia back in and using ice if becomes more painful.  If not able to reduce then go to emergency room.  Will work on scheduling for him.    Bull Ramirez MD      Again, thank you for allowing me to participate in the care of your patient.        Sincerely,        Bull Ramirez MD

## 2024-03-11 NOTE — PROGRESS NOTES
Patient seen in consultation for left inguinal hernia by Cabrera Bradshaw    HPI:  Patient is a 84 year old male  with complaints of some discomfort and pain and bulge in left groin  The patient noticed the symptoms about 9 months ago.  Started with the discomfort, bulge noticed about 3 months ago.  Can reduce/flatten out on its own.  Right side feels normal  No previous repair  nothing makes the episode better.  Patient has not family history of hernia problems    Review Of Systems    Skin: negative  Ears/Nose/Throat: negative  Respiratory: COPD vs medication side effect  Cardiovascular: history of stents and NSTEMI  Gastrointestinal: negative  Genitourinary: negative  Musculoskeletal: as above  Neurologic: negative  Hematologic/Lymphatic/Immunologic: negative  Endocrine: negative      Past Medical History:   Diagnosis Date    CAD (coronary artery disease) 10/2013    s/p 3 stents -- NL stress test 12/4/20    Colorectal polyps 08/03/2010    anal polyp removed    COPD (chronic obstructive pulmonary disease) (H) 4/2/2021    FHx: prostate cancer     father    Gout 02/2008    Hyperlipidemia LDL goal < 100 04/2006    Hypertension goal BP (blood pressure) < 140/90 05/2002    NSTEMI (non-ST elevation myocardial infarction) (H) 10/2013    Squamous cell carcinoma 05/2008    RT CHEEK       Past Surgical History:   Procedure Laterality Date    CATARACT IOL, RT/LT  06/2009    right    CATARACT IOL, RT/LT  08/13    left    COLONOSCOPY      COLONOSCOPY N/A 8/31/2015    Procedure: COLONOSCOPY;  Surgeon: Francisco Horowitz MD;  Location: [facility]    COLONOSCOPY  09/29/2020    COLONOSCOPY WITH CO2 INSUFFLATION N/A 8/31/2015    Procedure: COLONOSCOPY WITH CO2 INSUFFLATION;  Surgeon: Francisco Horowitz MD;  Location: [facility]    removal of rectal polyp  08/10    STENT, CORONARY, SAMANTA  10/13    3 coronary stents s/p NSTEMI    TONSILLECTOMY & ADENOIDECTOMY  1945    VASECTOMY  04/07/78       Social History     Socioeconomic History     Marital status:      Spouse name: Not on file    Number of children: 2    Years of education: Not on file    Highest education level: Not on file   Occupational History     Employer: [employer]   Tobacco Use    Smoking status: Former     Packs/day: 1     Types: Cigarettes     Start date: 1959     Quit date: 1974     Years since quittin.2     Passive exposure: Past    Smokeless tobacco: Never   Vaping Use    Vaping Use: Never used   Substance and Sexual Activity    Alcohol use: Yes     Comment: 2 glasses of wine per day     Drug use: No    Sexual activity: Never     Partners: Female   Other Topics Concern    Parent/sibling w/ CABG, MI or angioplasty before 65F 55M? No   Social History Narrative    Not on file     Social Determinants of Health     Financial Resource Strain: Low Risk  (2024)    Financial Resource Strain     Within the past 12 months, have you or your family members you live with been unable to get utilities (heat, electricity) when it was really needed?: No   Food Insecurity: Low Risk  (2024)    Food Insecurity     Within the past 12 months, did you worry that your food would run out before you got money to buy more?: No     Within the past 12 months, did the food you bought just not last and you didn t have money to get more?: No   Transportation Needs: Low Risk  (2024)    Transportation Needs     Within the past 12 months, has lack of transportation kept you from medical appointments, getting your medicines, non-medical meetings or appointments, work, or from getting things that you need?: No   Physical Activity: Not on file   Stress: Not on file   Social Connections: Not on file   Interpersonal Safety: Low Risk  (10/20/2023)    Interpersonal Safety     Do you feel physically and emotionally safe where you currently live?: Yes     Within the past 12 months, have you been hit, slapped, kicked or otherwise physically hurt by someone?: No     Within the past 12 months, have  you been humiliated or emotionally abused in other ways by your partner or ex-partner?: No   Housing Stability: Low Risk  (1/29/2024)    Housing Stability     Do you have housing? : Yes     Are you worried about losing your housing?: No       Current Outpatient Medications   Medication Sig Dispense Refill    ASPIRIN 81 MG PO TABS 1 TABLET DAILY      atorvastatin (LIPITOR) 40 MG tablet Take 1 tablet (40 mg) by mouth daily 90 tablet 3    diltiazem ER (DILT-XR) 240 MG 24 hr ER beaded capsule Take 1 capsule (240 mg) by mouth daily 90 capsule 3    lisinopril (ZESTRIL) 40 MG tablet Take 1 tablet (40 mg) by mouth daily 90 tablet 3    MULTI-VITAMIN PO TABS 1 TABLET DAILY      nitroGLYcerin (NITROSTAT) 0.4 MG sublingual tablet Place 1 tablet (0.4 mg) under the tongue every 5 minutes as needed (Patient not taking: Reported on 3/6/2024) 25 tablet 1    spironolactone (ALDACTONE) 25 MG tablet Take 1 tablet (25 mg) by mouth daily (Patient taking differently: Take 12.5 mg by mouth daily) 90 tablet 0       Medications and history reviewed    Physical exam:  Vitals: BP (!) 145/83   Pulse 72   Wt 84.4 kg (186 lb)   BMI 26.84 kg/m    BMI= Body mass index is 26.84 kg/m .    Constitutional: healthy, alert, and no distress  Head: Normocephalic. No masses, lesions, tenderness or abnormalities  Cardiovascular: negative, PMI normal. No lifts, heaves, or thrills. RRR. No murmurs, clicks gallops or rub  Respiratory: negative, Percussion normal. Good diaphragmatic excursion. Lungs clear  Gastrointestinal: Abdomen soft, non-tender. BS normal. No masses, organomegaly  : male positive for reducible left inguinal hernia. Possible cord lipoma on right (subtle lump in canal no impulse or change with cough/palpation)  Musculoskeletal: extremities normal- no gross deformities noted, gait normal, and normal muscle tone  Skin: no suspicious lesions or rashes  Psychiatric: mentation appears normal and affect  normal/bright  Hematologic/Lymphatic/Immunologic: Normal cervical lymph nodes  Patient able to get up on table without difficulty.      Assessment:     ICD-10-CM    1. Left inguinal hernia  K40.90 Case Request: HERNIORRHAPHY, INGUINAL, ROBOT-ASSISTED, LAPAROSCOPIC, USING DA CARLOS ALBERTO XI left possible bilateral        Plan: Symptomatic reducible left inguinal hernia with possible asymptomatic cord lipoma versus smaller hernia on the right.  Offered a minimally invasive robotic approach to repair.  Would be able to look over on the right at time of surgery to determine if hernia present and if so could fix as well.  Risks of surgery discussed including, but not limited to bleeding, infection, recurrence, damage to intra-abdominal contents such as nerves, blood vessels, bowel or other organs.  Risks of anesthesia also discussed.  Although mesh is a better long term repair if it gets infected it must be removed. Mesh problems such as fracture, erosion or adhesions are possible.  If there is evidence of an infection at time of surgery it will be cancelled and rescheduled to when well.    Discussed massaging hernia back in and using ice if becomes more painful.  If not able to reduce then go to emergency room.  Will work on scheduling for him.    Bull Ramirez MD

## 2024-03-11 NOTE — TELEPHONE ENCOUNTER
Type of surgery: HERNIORRHAPHY, INGUINAL, ROBOT-ASSISTED, LAPAROSCOPIC, USING DA CARLOS ALBERTO XI left possible bilateral (Left)   [place] [facility]  Date and time of surgery: 06/05/2024  Surgeon: BRIAN  Pre-Op Appt Date: 05/29/2024  Post-Op Appt Date: 06/24/2024   Packet sent out: Yes  Pre-cert/Authorization completed:  No  Date:

## 2024-03-28 ENCOUNTER — OFFICE VISIT (OUTPATIENT)
Dept: CARDIOLOGY | Facility: CLINIC | Age: 84
End: 2024-03-28
Attending: FAMILY MEDICINE
Payer: COMMERCIAL

## 2024-03-28 VITALS
WEIGHT: 187 LBS | DIASTOLIC BLOOD PRESSURE: 64 MMHG | OXYGEN SATURATION: 97 % | HEART RATE: 78 BPM | SYSTOLIC BLOOD PRESSURE: 120 MMHG | BODY MASS INDEX: 26.99 KG/M2

## 2024-03-28 DIAGNOSIS — I25.10 CORONARY ARTERY DISEASE INVOLVING NATIVE CORONARY ARTERY OF NATIVE HEART WITHOUT ANGINA PECTORIS: ICD-10-CM

## 2024-03-28 DIAGNOSIS — R06.09 DOE (DYSPNEA ON EXERTION): ICD-10-CM

## 2024-03-28 DIAGNOSIS — I21.4 NSTEMI (NON-ST ELEVATED MYOCARDIAL INFARCTION) (H): Primary | ICD-10-CM

## 2024-03-28 PROCEDURE — 99204 OFFICE O/P NEW MOD 45 MIN: CPT | Performed by: INTERNAL MEDICINE

## 2024-03-28 NOTE — PROGRESS NOTES
SUBJECTIVE:  Samy Henry is a 84 year old male who presents for cardiac evaluation due to known coronary artery disease.  Patient presented with dyspnea on exertion in 2013.  His troponin was minimally elevated at 0.13 at peak.  Had coronary angiogram on 10/25/2013.  Result as follows.     The left main artery has minimal disease.    95% stenosis in the mid LAD    95% stenosis in the ostial 2nd Diagonal    60% stenosis in the proximal 1st Diagonal    60% stenosis in the proximal 1st Obtuse Marginal    80% stenosis in the distal RCA   INTERVENTION    Successful angioplasty and stenting (drug eluting) of the distal   right coronary artery    Successful angioplasty and stenting (drug eluting) of the mid   LAD    Successful angioplasty and stenting (drug eluting) of the ostial   2nd diagonal    Kissing balloon technique successfully used for the LAD and 2nd   diagonal intervention    Patient's symptoms completely resolved for 5 years.  Then again patient started getting dyspnea on exertion.  He had normal stress MPI in 2014 and 2016.  High stress MRI in 2020 which showed no ischemia.  Small anterior/anteroseptal scar.  Normal LV function.  The scar was transmural/more than 75% enhancement.  Patient is a retired .  Patient had significant shortness of breath while he was on beta-blocker.  Subsequently this was changed to calcium channel blocker and he felt better.  Currently patient can walk on level ground but climbing steps he feels short of breath.  No PND or other heart failure symptoms.  Current cardiac medications are Aldactone 25 mg daily, atorvastatin 40 mg daily, diltiazem ER to 40 mg daily, lisinopril 40 mg daily and aspirin.  Patient Active Problem List    Diagnosis Date Noted    Left inguinal hernia 03/11/2024     Priority: Medium    Actinic keratoses 05/09/2023     Priority: Medium    Hyperuricemia 08/24/2022     Priority: Medium    COPD (chronic obstructive pulmonary disease) (H)  04/02/2021     Priority: Medium    Coronary artery disease involving native coronary artery of native heart without angina pectoris 07/18/2016     Priority: Medium    Essential hypertension with goal blood pressure less than 140/90 07/12/2016     Priority: Medium    Idiopathic chronic gout without tophus, unspecified site 07/12/2016     Priority: Medium    FHx: prostate cancer 07/06/2015     Priority: Medium    Hyperlipidemia LDL goal <100 10/31/2013     Priority: Medium    History of non-ST elevation myocardial infarction (NSTEMI) 10/31/2013     Priority: Medium    Colorectal polyps 08/03/2010     Priority: Medium    Hypertension goal BP (blood pressure) < 140/90 05/01/2002     Priority: Medium    .  Current Outpatient Medications   Medication Sig    ASPIRIN 81 MG PO TABS 1 TABLET DAILY    atorvastatin (LIPITOR) 40 MG tablet Take 1 tablet (40 mg) by mouth daily    diltiazem ER (DILT-XR) 240 MG 24 hr ER beaded capsule Take 1 capsule (240 mg) by mouth daily    lisinopril (ZESTRIL) 40 MG tablet Take 1 tablet (40 mg) by mouth daily    MULTI-VITAMIN PO TABS 1 TABLET DAILY    spironolactone (ALDACTONE) 25 MG tablet Take 1 tablet (25 mg) by mouth daily (Patient taking differently: Take 12.5 mg by mouth daily)    nitroGLYcerin (NITROSTAT) 0.4 MG sublingual tablet Place 1 tablet (0.4 mg) under the tongue every 5 minutes as needed (Patient not taking: Reported on 3/6/2024)     No current facility-administered medications for this visit.     Past Medical History:   Diagnosis Date    CAD (coronary artery disease) 10/2013    s/p 3 stents -- NL stress test 12/4/20    Colorectal polyps 08/03/2010    anal polyp removed    COPD (chronic obstructive pulmonary disease) (H) 4/2/2021    FHx: prostate cancer     father    Gout 02/2008    Hyperlipidemia LDL goal < 100 04/2006    Hypertension goal BP (blood pressure) < 140/90 05/2002    NSTEMI (non-ST elevation myocardial infarction) (H) 10/2013    Squamous cell carcinoma 05/2008    RT  CHEEK     Past Surgical History:   Procedure Laterality Date    CATARACT IOL, RT/LT  2009    right    CATARACT IOL, RT/LT      left    COLONOSCOPY      COLONOSCOPY N/A 2015    Procedure: COLONOSCOPY;  Surgeon: Francisco Horowitz MD;  Location: [facility]    COLONOSCOPY  2020    COLONOSCOPY WITH CO2 INSUFFLATION N/A 2015    Procedure: COLONOSCOPY WITH CO2 INSUFFLATION;  Surgeon: Francisco Horowitz MD;  Location: [facility]    removal of rectal polyp  08/10    STENT, CORONARY, SAMANTA  10/13    3 coronary stents s/p NSTEMI    TONSILLECTOMY & ADENOIDECTOMY      VASECTOMY  79     No Known Allergies  Social History     Socioeconomic History    Marital status:      Spouse name: Not on file    Number of children: 2    Years of education: Not on file    Highest education level: Not on file   Occupational History     Employer: [employer]   Tobacco Use    Smoking status: Former     Packs/day: 1     Types: Cigarettes     Start date: 1959     Quit date: 1974     Years since quittin.2     Passive exposure: Past    Smokeless tobacco: Never   Vaping Use    Vaping Use: Never used   Substance and Sexual Activity    Alcohol use: Yes     Comment: 2 glasses of wine per day     Drug use: No    Sexual activity: Never     Partners: Female   Other Topics Concern    Parent/sibling w/ CABG, MI or angioplasty before 65F 55M? No   Social History Narrative    Not on file     Social Determinants of Health     Financial Resource Strain: Low Risk  (2024)    Financial Resource Strain     Within the past 12 months, have you or your family members you live with been unable to get utilities (heat, electricity) when it was really needed?: No   Food Insecurity: Low Risk  (2024)    Food Insecurity     Within the past 12 months, did you worry that your food would run out before you got money to buy more?: No     Within the past 12 months, did the food you bought just not last and you didn t have money  to get more?: No   Transportation Needs: Low Risk  (1/29/2024)    Transportation Needs     Within the past 12 months, has lack of transportation kept you from medical appointments, getting your medicines, non-medical meetings or appointments, work, or from getting things that you need?: No   Physical Activity: Not on file   Stress: Not on file   Social Connections: Not on file   Interpersonal Safety: Low Risk  (10/20/2023)    Interpersonal Safety     Do you feel physically and emotionally safe where you currently live?: Yes     Within the past 12 months, have you been hit, slapped, kicked or otherwise physically hurt by someone?: No     Within the past 12 months, have you been humiliated or emotionally abused in other ways by your partner or ex-partner?: No   Housing Stability: Low Risk  (1/29/2024)    Housing Stability     Do you have housing? : Yes     Are you worried about losing your housing?: No     Family History   Problem Relation Age of Onset    Hypertension Mother     Breast Cancer Mother     Cancer - colorectal Father     Prostate Cancer Father     Cancer Brother         lymphoma    Hypertension Sister     Neurologic Disorder Maternal Grandmother     Cancer Paternal Grandfather           REVIEW OF SYSTEMS:  General: negative, fever, chills, night sweats  Skin: negative, acne, rash, and scaling  Eyes: negative, double vision, eye pain, and photophobia  Ears/Nose/Throat: negative, nasal congestion, and purulent rhinorrhea  Respiratory: No cough, No hemoptysis, and negative  Cardiovascular: negative, palpitations, tachycardia, irregular heart beat, chest pain, exertional chest pain or pressure, paroxysmal nocturnal dyspnea, and orthopnea         OBJECTIVE:  Blood pressure 120/64, pulse 78, weight 84.8 kg (187 lb), SpO2 97%.  General Appearance: healthy, alert, active, and no distress  Head: Normocephalic. No masses, lesions, tenderness or abnormalities  Eyes: conjuctiva clear, PERRL, EOM intact  Ears:  External ears normal. Canals clear. TM's normal.  Nose: Nares normal  Mouth: normal  Neck: Supple, no cervical adenopathy, no thyromegaly  Lungs: clear to auscultation  Cardiac: regular rate and rhythm, normal S1 and S2, no murmur       ASSESSMENT/PLAN:  Patient here for cardiac evaluation due to dyspnea on exertion.  History of coronary angiogram and stent to the LAD/D2 and RCA in 2013 after an NSTEMI with a peak troponin of 1.13.  He presented with dyspnea on exertion at that time.  The symptoms improved for about 5 years after the PCI.  Now recurrence of the symptoms.  Patient had normal nuclear stress test in 2014 and 16.  In 2020 patient had a stress MRI which showed no ischemia but more than 75% small scar in the anterior septal region.  EF was normal  Patient's cardiac risk factors hypertension and hyperlipidemia.  No diabetes  Patient known to have normal LV function and no regional wall motion abnormality by echocardiogram 5 years ago.  His EKG shows normal sinus rhythm and right bundle branch block.  Because of his symptoms we will plan for a Lexiscan to reassess for any ischemia.  Also will get an echocardiogram to assess cardiac function, diastolic function and valvular function.  Patient will be contacted with the test results.  Total visit duration 45 minutes.  This includes face-to-face interview, physical exam, chart review, extensive review of outside records in Care Everywhere including prior NSTEMI, PCI, nuclear stress test, cardiac MRI EKGs and documentation.

## 2024-03-28 NOTE — NURSING NOTE
"Chief Complaint   Patient presents with    Coronary Artery Disease     New General Cardiology for CAD    Shortness of Breath       Initial /64 (BP Location: Right arm, Patient Position: Chair, Cuff Size: Adult Regular)   Pulse 78   Wt 84.8 kg (187 lb)   SpO2 97%   BMI 26.99 kg/m   Estimated body mass index is 26.99 kg/m  as calculated from the following:    Height as of 2/5/24: 1.773 m (5' 9.8\").    Weight as of this encounter: 84.8 kg (187 lb)..  BP completed using cuff size: regular    ESTRELLA Rudd    "

## 2024-03-28 NOTE — LETTER
3/28/2024      RE: Samy Henry  [address] [facility] [address]       Dear Colleague,    Thank you for the opportunity to participate in the care of your patient, Samy Henry, at the [facility]. Please see a copy of my visit note below.       SUBJECTIVE:  Samy Henry is a 84 year old male who presents for cardiac evaluation due to known coronary artery disease.  Patient presented with dyspnea on exertion in 2013.  His troponin was minimally elevated at 0.13 at peak.  Had coronary angiogram on 10/25/2013.  Result as follows.     The left main artery has minimal disease.    95% stenosis in the mid LAD    95% stenosis in the ostial 2nd Diagonal    60% stenosis in the proximal 1st Diagonal    60% stenosis in the proximal 1st Obtuse Marginal    80% stenosis in the distal RCA   INTERVENTION    Successful angioplasty and stenting (drug eluting) of the distal   right coronary artery    Successful angioplasty and stenting (drug eluting) of the mid   LAD    Successful angioplasty and stenting (drug eluting) of the ostial   2nd diagonal    Kissing balloon technique successfully used for the LAD and 2nd   diagonal intervention    Patient's symptoms completely resolved for 5 years.  Then again patient started getting dyspnea on exertion.  He had normal stress MPI in 2014 and 2016.  High stress MRI in 2020 which showed no ischemia.  Small anterior/anteroseptal scar.  Normal LV function.  The scar was transmural/more than 75% enhancement.  Patient is a retired .  Patient had significant shortness of breath while he was on beta-blocker.  Subsequently this was changed to calcium channel blocker and he felt better.  Currently patient can walk on level ground but climbing steps he feels short of breath.  No PND or other heart failure symptoms.  Current cardiac medications are Aldactone 25 mg daily,  atorvastatin 40 mg daily, diltiazem ER to 40 mg daily, lisinopril 40 mg daily and aspirin.  Patient Active Problem List    Diagnosis Date Noted     Left inguinal hernia 03/11/2024     Priority: Medium     Actinic keratoses 05/09/2023     Priority: Medium     Hyperuricemia 08/24/2022     Priority: Medium     COPD (chronic obstructive pulmonary disease) (H) 04/02/2021     Priority: Medium     Coronary artery disease involving native coronary artery of native heart without angina pectoris 07/18/2016     Priority: Medium     Essential hypertension with goal blood pressure less than 140/90 07/12/2016     Priority: Medium     Idiopathic chronic gout without tophus, unspecified site 07/12/2016     Priority: Medium     FHx: prostate cancer 07/06/2015     Priority: Medium     Hyperlipidemia LDL goal <100 10/31/2013     Priority: Medium     History of non-ST elevation myocardial infarction (NSTEMI) 10/31/2013     Priority: Medium     Colorectal polyps 08/03/2010     Priority: Medium     Hypertension goal BP (blood pressure) < 140/90 05/01/2002     Priority: Medium    .  Current Outpatient Medications   Medication Sig     ASPIRIN 81 MG PO TABS 1 TABLET DAILY     atorvastatin (LIPITOR) 40 MG tablet Take 1 tablet (40 mg) by mouth daily     diltiazem ER (DILT-XR) 240 MG 24 hr ER beaded capsule Take 1 capsule (240 mg) by mouth daily     lisinopril (ZESTRIL) 40 MG tablet Take 1 tablet (40 mg) by mouth daily     MULTI-VITAMIN PO TABS 1 TABLET DAILY     spironolactone (ALDACTONE) 25 MG tablet Take 1 tablet (25 mg) by mouth daily (Patient taking differently: Take 12.5 mg by mouth daily)     nitroGLYcerin (NITROSTAT) 0.4 MG sublingual tablet Place 1 tablet (0.4 mg) under the tongue every 5 minutes as needed (Patient not taking: Reported on 3/6/2024)     No current facility-administered medications for this visit.     Past Medical History:   Diagnosis Date     CAD (coronary artery disease) 10/2013    s/p 3 stents -- NL stress test  20     Colorectal polyps 2010    anal polyp removed     COPD (chronic obstructive pulmonary disease) (H) 2021     FHx: prostate cancer     father     Gout 2008     Hyperlipidemia LDL goal < 100 2006     Hypertension goal BP (blood pressure) < 140/90 2002     NSTEMI (non-ST elevation myocardial infarction) (H) 10/2013     Squamous cell carcinoma 2008    RT CHEEK     Past Surgical History:   Procedure Laterality Date     CATARACT IOL, RT/LT  2009    right     CATARACT IOL, RT/LT      left     COLONOSCOPY       COLONOSCOPY N/A 2015    Procedure: COLONOSCOPY;  Surgeon: Francisco Horowitz MD;  Location: [facility]     COLONOSCOPY  2020     COLONOSCOPY WITH CO2 INSUFFLATION N/A 2015    Procedure: COLONOSCOPY WITH CO2 INSUFFLATION;  Surgeon: Francisco Horowitz MD;  Location: [facility]     removal of rectal polyp  08/10     STENT, CORONARY, SAMANTA  10/13    3 coronary stents s/p NSTEMI     TONSILLECTOMY & ADENOIDECTOMY       VASECTOMY  79     No Known Allergies  Social History     Socioeconomic History     Marital status:      Spouse name: Not on file     Number of children: 2     Years of education: Not on file     Highest education level: Not on file   Occupational History     Employer: [employer]   Tobacco Use     Smoking status: Former     Packs/day: 1     Types: Cigarettes     Start date: 1959     Quit date: 1974     Years since quittin.2     Passive exposure: Past     Smokeless tobacco: Never   Vaping Use     Vaping Use: Never used   Substance and Sexual Activity     Alcohol use: Yes     Comment: 2 glasses of wine per day      Drug use: No     Sexual activity: Never     Partners: Female   Other Topics Concern     Parent/sibling w/ CABG, MI or angioplasty before 65F 55M? No   Social History Narrative     Not on file     Social Determinants of Health     Financial Resource Strain: Low Risk  (2024)    Financial Resource Strain      Within the  past 12 months, have you or your family members you live with been unable to get utilities (heat, electricity) when it was really needed?: No   Food Insecurity: Low Risk  (1/29/2024)    Food Insecurity      Within the past 12 months, did you worry that your food would run out before you got money to buy more?: No      Within the past 12 months, did the food you bought just not last and you didn t have money to get more?: No   Transportation Needs: Low Risk  (1/29/2024)    Transportation Needs      Within the past 12 months, has lack of transportation kept you from medical appointments, getting your medicines, non-medical meetings or appointments, work, or from getting things that you need?: No   Physical Activity: Not on file   Stress: Not on file   Social Connections: Not on file   Interpersonal Safety: Low Risk  (10/20/2023)    Interpersonal Safety      Do you feel physically and emotionally safe where you currently live?: Yes      Within the past 12 months, have you been hit, slapped, kicked or otherwise physically hurt by someone?: No      Within the past 12 months, have you been humiliated or emotionally abused in other ways by your partner or ex-partner?: No   Housing Stability: Low Risk  (1/29/2024)    Housing Stability      Do you have housing? : Yes      Are you worried about losing your housing?: No     Family History   Problem Relation Age of Onset     Hypertension Mother      Breast Cancer Mother      Cancer - colorectal Father      Prostate Cancer Father      Cancer Brother         lymphoma     Hypertension Sister      Neurologic Disorder Maternal Grandmother      Cancer Paternal Grandfather           REVIEW OF SYSTEMS:  General: negative, fever, chills, night sweats  Skin: negative, acne, rash, and scaling  Eyes: negative, double vision, eye pain, and photophobia  Ears/Nose/Throat: negative, nasal congestion, and purulent rhinorrhea  Respiratory: No cough, No hemoptysis, and negative  Cardiovascular:  negative, palpitations, tachycardia, irregular heart beat, chest pain, exertional chest pain or pressure, paroxysmal nocturnal dyspnea, and orthopnea         OBJECTIVE:  Blood pressure 120/64, pulse 78, weight 84.8 kg (187 lb), SpO2 97%.  General Appearance: healthy, alert, active, and no distress  Head: Normocephalic. No masses, lesions, tenderness or abnormalities  Eyes: conjuctiva clear, PERRL, EOM intact  Ears: External ears normal. Canals clear. TM's normal.  Nose: Nares normal  Mouth: normal  Neck: Supple, no cervical adenopathy, no thyromegaly  Lungs: clear to auscultation  Cardiac: regular rate and rhythm, normal S1 and S2, no murmur       ASSESSMENT/PLAN:  Patient here for cardiac evaluation due to dyspnea on exertion.  History of coronary angiogram and stent to the LAD/D2 and RCA in 2013 after an NSTEMI with a peak troponin of 1.13.  He presented with dyspnea on exertion at that time.  The symptoms improved for about 5 years after the PCI.  Now recurrence of the symptoms.  Patient had normal nuclear stress test in 2014 and 16.  In 2020 patient had a stress MRI which showed no ischemia but more than 75% small scar in the anterior septal region.  EF was normal  Patient's cardiac risk factors hypertension and hyperlipidemia.  No diabetes  Patient known to have normal LV function and no regional wall motion abnormality by echocardiogram 5 years ago.  His EKG shows normal sinus rhythm and right bundle branch block.  Because of his symptoms we will plan for a Lexiscan to reassess for any ischemia.  Also will get an echocardiogram to assess cardiac function, diastolic function and valvular function.  Patient will be contacted with the test results.  Total visit duration 45 minutes.  This includes face-to-face interview, physical exam, chart review, extensive review of outside records in Care Everywhere including prior NSTEMI, PCI, nuclear stress test, cardiac MRI EKGs and documentation.    Please do not hesitate  to contact me if you have any questions/concerns.     Sincerely,     GLORIA Aguila MD

## 2024-03-28 NOTE — PATIENT INSTRUCTIONS
"Thank you for coming to the [facility] [place] [place]; please note the following instructions:    1. Nuclear stress test to rule out coronary artery blockage    Results will determine next step    Information for Lexiscan Test     Test can take up to 4 hours.  Nothing to eat 3 hours prior.  Water is permitted.  No caffiene 12 hours prior.  Wear comfortable clothing.     Cardiac nuclear imaging measures the flow of blood in your heart at rest and then during exercise. The images are compared to determine whether there are any blockages in the arteries, changes in blood flow or oxygen supply from resting to the stressed state, areas of scar tissue, or if there has been a prior heart attack. It also measures how well the heart muscle squeezes and pumps. The test is also sometimes called a \"perfusion scan\" or a \"SPECT MPI\" (single photon emission computed tomography myocardial perfusion imaging). For the scan, a small amount of radioactive material called \"tracer\" is delivered into the bloodstream. A special camera then scans the tracer in the blood as it flows through the heart muscle. Areas of the heart that have good blood flow absorb the tracer. Areas that are not getting enough blood will not absorb the tracer. This can be a sign of a blocked artery, vessel narrowing, or any area of the heart not receiving blood, perhaps as a result of damage from a heart attack. The tracer leaves your body within hours. This test can be done in a hospital or test center.     During your test  Here is what to expect during your test:              You may be asked to change into a hospital gown from the waist up.              You will be attached to EKG, which monitors your heart rhythm, and blood pressure monitors. An IV (intravenous) line will be started in your arm.              At some point, scanning pictures will be taken while you rest. This may be done before you exercise or you may be asked to return " for resting scans later that day or the next.              You will exercise on a treadmill or stationary bike for a few minutes. This increases the rate of blood flow to your heart muscle.              Speak up when you feel that you cannot exercise for even 1 more minute. At this point, the tracer is given to you through the IV, as this is considered the point of maximum stress.               If you cannot exercise by using a treadmill or bicycle, special medicines can be used to artificially increase heart rate while you are resting. This is done to put your heart under stress.               After you have received the tracer, you will be positioned on the scanning bed.              You must lie very still for up to 30 minutes. During this time, a scanning camera will be taking pictures of your heart. The images will show where blood flows through your heart muscle.  After your test  Before going home, ask when you may eat. Also, find out when to resume taking any medicines you were told to skip before the test. If you need to return for resting scans, follow any instructions. Most people can go back to their normal routine as soon as all parts of the test are finished. Drink plenty of water to help flush the tracer from your body.  Let the technologist know  Inform the technologist about the following:              What medicines you take              If you have diabetes, knee or hip problems, arthritis, asthma, or chronic lung disease              Recent chest pain since your last appointment              Inability to participate in exercise               If you have had a stroke or have vascular disease of the leg              If you are pregnant, think you might be, or are nursing  Report any symptoms  Be sure to tell the healthcare provider if you feel any of the following during the test:              Chest, arm, or jaw discomfort              Severe shortness of breath              Dizziness or  lightheadedness              Feelings of panic              Inability to participate in exercise               Palpitations              Leg cramps or pain               If you have any questions regarding your visit please contact your care team:     Cardiology  Telephone Number   Corrie ORELLANA., RN  Ele OSUNA, RN  Ana Paula KYLE, RN  Lita VOGT, ESTRELLA GOODSON, RMDARIEL MCCLAIN, Visit Facilitator  Elena ROSADO [facility] [phone] (option 1)   For scheduling appts:     [phone] (select option 1)       For the Device Clinic (Pacemakers and ICD's)  RN's :  Britany Villar   During business hours: [phone]    *After business hours:  [phone] (select option 4)      Normal test result notifications will be released via [employer] or mailed within 7 business days.  All other test results, will be communicated via telephone once reviewed by your cardiologist.    If you need a medication refill please contact your pharmacy.  Please allow 3 business days for your refill to be completed.    As always, thank you for trusting us with your health care needs!

## 2024-04-04 ENCOUNTER — HOSPITAL ENCOUNTER (OUTPATIENT)
Dept: CARDIOLOGY | Facility: CLINIC | Age: 84
Discharge: HOME | End: 2024-04-04
Attending: INTERNAL MEDICINE
Payer: COMMERCIAL

## 2024-04-04 ENCOUNTER — ANCILLARY ORDERS (OUTPATIENT)
Dept: CARDIOLOGY | Facility: CLINIC | Age: 84
End: 2024-04-04

## 2024-04-04 ENCOUNTER — HOSPITAL ENCOUNTER (OUTPATIENT)
Dept: NUCLEAR MEDICINE | Facility: CLINIC | Age: 84
Setting detail: NUCLEAR MEDICINE
Discharge: HOME | End: 2024-04-04
Attending: INTERNAL MEDICINE
Payer: COMMERCIAL

## 2024-04-04 VITALS — SYSTOLIC BLOOD PRESSURE: 117 MMHG | HEART RATE: 87 BPM | DIASTOLIC BLOOD PRESSURE: 52 MMHG

## 2024-04-04 DIAGNOSIS — R06.09 DOE (DYSPNEA ON EXERTION): ICD-10-CM

## 2024-04-04 DIAGNOSIS — I25.10 CORONARY ARTERY DISEASE INVOLVING NATIVE CORONARY ARTERY OF NATIVE HEART WITHOUT ANGINA PECTORIS: ICD-10-CM

## 2024-04-04 DIAGNOSIS — I25.10 CORONARY ARTERY DISEASE INVOLVING NATIVE CORONARY ARTERY OF NATIVE HEART WITHOUT ANGINA PECTORIS: Primary | ICD-10-CM

## 2024-04-04 DIAGNOSIS — I21.4 NSTEMI (NON-ST ELEVATED MYOCARDIAL INFARCTION) (H): ICD-10-CM

## 2024-04-04 LAB
CV STRESS MAX HR HE: 90
RATE PRESSURE PRODUCT: NORMAL
STRESS ECHO BASELINE DIASTOLIC HE: 82
STRESS ECHO BASELINE HR: 68 BPM
STRESS ECHO BASELINE SYSTOLIC BP: 143
STRESS ECHO CALCULATED PERCENT HR: 66 %
STRESS ECHO LAST STRESS DIASTOLIC BP: 52
STRESS ECHO LAST STRESS SYSTOLIC BP: 117
STRESS ECHO TARGET HR: 137

## 2024-04-04 PROCEDURE — 78452 HT MUSCLE IMAGE SPECT MULT: CPT

## 2024-04-04 PROCEDURE — 93018 CV STRESS TEST I&R ONLY: CPT | Performed by: INTERNAL MEDICINE

## 2024-04-04 PROCEDURE — 93016 CV STRESS TEST SUPVJ ONLY: CPT | Performed by: INTERNAL MEDICINE

## 2024-04-04 PROCEDURE — 250N000011 HC RX IP 250 OP 636: Performed by: INTERNAL MEDICINE

## 2024-04-04 PROCEDURE — 343N000001 HC RX 343: Performed by: INTERNAL MEDICINE

## 2024-04-04 PROCEDURE — 93017 CV STRESS TEST TRACING ONLY: CPT

## 2024-04-04 PROCEDURE — A9502 TC99M TETROFOSMIN: HCPCS | Performed by: INTERNAL MEDICINE

## 2024-04-04 PROCEDURE — 78452 HT MUSCLE IMAGE SPECT MULT: CPT | Mod: 26 | Performed by: RADIOLOGY

## 2024-04-04 RX ORDER — CAFFEINE CITRATE 20 MG/ML
60 SOLUTION INTRAVENOUS
Status: DISCONTINUED | OUTPATIENT
Start: 2024-04-04 | End: 2024-04-05 | Stop reason: HOSPADM

## 2024-04-04 RX ORDER — ALBUTEROL SULFATE 90 UG/1
2 AEROSOL, METERED RESPIRATORY (INHALATION) EVERY 5 MIN PRN
Status: DISCONTINUED | OUTPATIENT
Start: 2024-04-04 | End: 2024-04-05 | Stop reason: HOSPADM

## 2024-04-04 RX ORDER — AMINOPHYLLINE 25 MG/ML
50-100 INJECTION, SOLUTION INTRAVENOUS
Status: DISCONTINUED | OUTPATIENT
Start: 2024-04-04 | End: 2024-04-05 | Stop reason: HOSPADM

## 2024-04-04 RX ORDER — REGADENOSON 0.08 MG/ML
0.4 INJECTION, SOLUTION INTRAVENOUS ONCE
Status: COMPLETED | OUTPATIENT
Start: 2024-04-04 | End: 2024-04-04

## 2024-04-04 RX ORDER — ACYCLOVIR 200 MG/1
0-1 CAPSULE ORAL
Status: DISCONTINUED | OUTPATIENT
Start: 2024-04-04 | End: 2024-04-05 | Stop reason: HOSPADM

## 2024-04-04 RX ADMIN — TETROFOSMIN 38.5 MILLICURIE: 1.38 INJECTION, POWDER, LYOPHILIZED, FOR SOLUTION INTRAVENOUS at 10:57

## 2024-04-04 RX ADMIN — TETROFOSMIN 10.4 MILLICURIE: 1.38 INJECTION, POWDER, LYOPHILIZED, FOR SOLUTION INTRAVENOUS at 09:54

## 2024-04-04 RX ADMIN — REGADENOSON 0.4 MG: 0.08 INJECTION, SOLUTION INTRAVENOUS at 10:54

## 2024-04-10 ENCOUNTER — ANCILLARY PROCEDURE (OUTPATIENT)
Dept: CARDIOLOGY | Facility: CLINIC | Age: 84
End: 2024-04-10
Attending: INTERNAL MEDICINE
Payer: COMMERCIAL

## 2024-04-10 DIAGNOSIS — R06.09 DOE (DYSPNEA ON EXERTION): ICD-10-CM

## 2024-04-10 DIAGNOSIS — I25.10 CORONARY ARTERY DISEASE INVOLVING NATIVE CORONARY ARTERY OF NATIVE HEART WITHOUT ANGINA PECTORIS: ICD-10-CM

## 2024-04-10 DIAGNOSIS — I21.4 NSTEMI (NON-ST ELEVATED MYOCARDIAL INFARCTION) (H): ICD-10-CM

## 2024-04-10 LAB
BI-PLANE LVEF ECHO: NORMAL
LVEF ECHO: NORMAL

## 2024-04-10 PROCEDURE — 93306 TTE W/DOPPLER COMPLETE: CPT | Performed by: INTERNAL MEDICINE

## 2024-04-15 ENCOUNTER — OFFICE VISIT (OUTPATIENT)
Dept: PHARMACY | Facility: CLINIC | Age: 84
End: 2024-04-15
Payer: COMMERCIAL

## 2024-04-15 VITALS
DIASTOLIC BLOOD PRESSURE: 58 MMHG | HEART RATE: 56 BPM | SYSTOLIC BLOOD PRESSURE: 120 MMHG | BODY MASS INDEX: 27.27 KG/M2 | WEIGHT: 189 LBS

## 2024-04-15 DIAGNOSIS — I25.10 CORONARY ARTERY DISEASE INVOLVING NATIVE CORONARY ARTERY OF NATIVE HEART WITHOUT ANGINA PECTORIS: Primary | ICD-10-CM

## 2024-04-15 DIAGNOSIS — I10 ESSENTIAL HYPERTENSION WITH GOAL BLOOD PRESSURE LESS THAN 140/90: ICD-10-CM

## 2024-04-15 PROCEDURE — 99607 MTMS BY PHARM ADDL 15 MIN: CPT | Performed by: PHARMACIST

## 2024-04-15 PROCEDURE — 99606 MTMS BY PHARM EST 15 MIN: CPT | Performed by: PHARMACIST

## 2024-04-15 RX ORDER — DILTIAZEM HYDROCHLORIDE 180 MG/1
180 CAPSULE, EXTENDED RELEASE ORAL DAILY
Qty: 90 CAPSULE | Refills: 1 | Status: SHIPPED | OUTPATIENT
Start: 2024-04-15 | End: 2024-09-04

## 2024-04-15 RX ORDER — SPIRONOLACTONE 25 MG/1
12.5 TABLET ORAL DAILY
Qty: 45 TABLET | Refills: 1 | Status: SHIPPED | OUTPATIENT
Start: 2024-04-15 | End: 2024-05-15

## 2024-04-15 NOTE — PROGRESS NOTES
Medication Therapy Management (MTM) Encounter    ASSESSMENT:                            Medication Adherence/Access: No issues identified    Hypertension /CAD, 3 stents: blood pressure is at goal < 140/90. Pulse in the 50s at times can contribute to dizziness, shortness of breath, can reduce diltiazem dose to see if this improves things while maintaining blood pressure control.     PLAN:                            Decrease diltiazem ER from 240 mg daily to 180 mg daily.  Continue spironolactone 12.5 mg daily.  Recheck blood pressure in 2-4 weeks.    Follow-up: Return in about 4 weeks (around 5/13/2024) for Medication Therapy Management.    SUBJECTIVE/OBJECTIVE:                          Dominic Henry is a 84 year old male called for a follow-up visit.       Reason for visit: blood pressure follow-up.    Allergies/ADRs: Reviewed in chart  Past Medical History: Reviewed in chart  Tobacco: He reports that he quit smoking about 50 years ago. His smoking use included cigarettes. He started smoking about 65 years ago. He has a 15 pack-year smoking history. He has been exposed to tobacco smoke. He has never used smokeless tobacco.  Alcohol: wine with dinner every night    Medication Adherence/Access:   Patient takes medications directly from bottles.  Patient takes medications 3 time(s) per day. -takes diltiazem by itself at 5 am.  Per patient, misses medication 0 times per week.   The patient fills medications at [place]: NO, fills medications at [facility].    Hypertension /CAD, 3 stents:  Aspirin 81 mg daily with food  Diltiazem  mg daily (5 am)  Lisinopril 40 mg daily   Spironolactone 12.5 mg daily - reduced three weeks ago, increased urination has improved.     Medication History:  Beta blockers (-olol drugs)contribute to shortness of breath, there was also some overlap with inhalers that had long-acting beta agonists in them which may have also exacerbated this.     Currently notes some very slight  distressed breathing when walking up hill carrying something, it goes away immediately when he stops, otherwise no real symptoms.   Has previously seen pulmonology, possibly COPD, however Dominic reports he didn't think he did an accurate test.   Recently had work up done with cardiology and everything looked good.    Patient reports the following medication side effects: dizziness with positional changes.  Patient self monitors blood pressure.  Home BP monitoring   4/3 144/85, 85 - after having a coke  4/6/24 118/71, 66  4/10/24 114/68, 59   4/12/24 125/71, 56  4/14/24 128/74, 61     BP Readings from Last 3 Encounters:   04/15/24 120/58   04/04/24 117/52   03/28/24 120/64     Pulse Readings from Last 3 Encounters:   04/15/24 56   04/04/24 87   03/28/24 78        Today's Vitals: /58   Pulse 56   Wt 189 lb (85.7 kg)   BMI 27.27 kg/m    ----------------      I spent 20 minutes with this patient today. All changes were made via collaborative practice agreement with Cabrera Bradshaw MD. A copy of the visit note was provided to the patient's provider(s).    A summary of these recommendations was sent via [employer].    Heather Frazier, PharmD  Medication Therapy Management Pharmacist           Medication Therapy Recommendations  Hypertension goal BP (blood pressure) < 140/90    Current Medication: diltiazem ER (DILT-XR) 240 MG 24 hr ER beaded capsule (Discontinued)   Rationale: Dose too high - Dosage too high - Safety   Recommendation: Decrease Dose   Status: Accepted per CPA

## 2024-04-15 NOTE — Clinical Note
RISSA OSULLIVAN note, thanks!  Heather Frazier, PharmD Medication Therapy Management Pharmacist [phone]

## 2024-04-15 NOTE — PATIENT INSTRUCTIONS
"Recommendations from today's MTM visit:                                                         Decrease diltiazem ER from 240 mg daily to 180 mg daily.  Continue spironolactone 12.5 mg daily.  Recheck blood pressure in 2-4 weeks.    Follow-up: Return in about 4 weeks (around 5/13/2024) for Medication Therapy Management.    It was great speaking with you today.  I value your experience and would be very thankful for your time in providing feedback in our clinic survey. In the next few days, you may receive an email or text message from [employer] with a link to a survey related to your  clinical pharmacist.\"     To schedule another MTM appointment, please call the clinic directly or you may call the MTM scheduling line at [phone] or toll-free at [phone].     My Clinical Pharmacist's contact information:                                                      Please feel free to contact me with any questions or concerns you have.      Heather Frazier, PharmD  Medication Therapy Management Pharmacist     "

## 2024-05-15 ENCOUNTER — OFFICE VISIT (OUTPATIENT)
Dept: PHARMACY | Facility: CLINIC | Age: 84
End: 2024-05-15
Payer: COMMERCIAL

## 2024-05-15 VITALS — DIASTOLIC BLOOD PRESSURE: 56 MMHG | SYSTOLIC BLOOD PRESSURE: 110 MMHG

## 2024-05-15 DIAGNOSIS — I10 ESSENTIAL HYPERTENSION WITH GOAL BLOOD PRESSURE LESS THAN 140/90: Primary | ICD-10-CM

## 2024-05-15 DIAGNOSIS — I25.10 CORONARY ARTERY DISEASE INVOLVING NATIVE CORONARY ARTERY OF NATIVE HEART WITHOUT ANGINA PECTORIS: ICD-10-CM

## 2024-05-15 PROCEDURE — 99606 MTMS BY PHARM EST 15 MIN: CPT | Performed by: PHARMACIST

## 2024-05-15 PROCEDURE — 99607 MTMS BY PHARM ADDL 15 MIN: CPT | Performed by: PHARMACIST

## 2024-05-15 NOTE — Clinical Note
RISSA OSULLIVAN note, thanks!  Heather Frazier, PharmD Medication Therapy Management Pharmacist [phone]

## 2024-05-15 NOTE — PROGRESS NOTES
Medication Therapy Management (MTM) Encounter    ASSESSMENT:                            Medication Adherence/Access: No issues identified    Hypertension /CAD, 3 stents: blood pressure is at goal < 140/90. Pulse in the 50s at times can contribute to dizziness, shortness of breath, can reduce diltiazem dose to see if this improves things while maintaining blood pressure control, he'd like to see if he could get off the spironolactone however. We can try this.    PLAN:                            1. Stop spironolactone.  2. Recheck blood pressure in 2-4 weeks.    Follow-up: Return in about 5 weeks (around 6/19/2024) for Medication Therapy Management.    SUBJECTIVE/OBJECTIVE:                          Dominic Henry is a 84 year old male coming in for a follow-up visit.       Reason for visit: blood pressure follow-up .    Allergies/ADRs: Reviewed in chart  Past Medical History: Reviewed in chart  Tobacco: He reports that he quit smoking about 50 years ago. His smoking use included cigarettes. He started smoking about 65 years ago. He has a 15 pack-year smoking history. He has been exposed to tobacco smoke. He has never used smokeless tobacco.  Alcohol: wine with dinner every night    Medication Adherence/Access:   Patient takes medications directly from bottles.  Patient takes medications 3 time(s) per day. -takes diltiazem by itself at 5 am.  Per patient, misses medication 0 times per week.   The patient fills medications at [place]: NO, fills medications at [facility].    Plan from last time:  Decrease diltiazem ER from 240 mg daily to 180 mg daily.  Continue spironolactone 12.5 mg daily.  Recheck blood pressure in 2-4 weeks.    Hypertension   /CAD, 3 stents:  Aspirin 81 mg daily with food  Diltiazem  mg daily (5 am) - reduced from 240 mg daily one month ago  Lisinopril 40 mg daily   Spironolactone 12.5 mg daily - reduced seven weeks ago    Medication History:  Beta blockers (-olol drugs)contribute to  shortness of breath, there was also some overlap with inhalers that had long-acting beta agonists in them which may have also exacerbated this.     Currently notes some very slight distressed breathing when walking up hill carrying something, it goes away immediately when he stops, otherwise no real symptoms - has gotten better as we've backed off on some blood pressure medications (and off the olol drugs) - hasn't noticed a big change in this with reducing diltiazem, is the same.  Has previously seen pulmonology, possibly COPD, however Dominic reports he didn't think he did an accurate test.   Recently had work up done with cardiology and everything looked good.    Patient reports the following medication side effects: dizziness with positional changes, increased urination with spirolactone.   Patient self monitors blood pressure.  Home BP monitoring   5/15/24 123/69, 62  5/13/24 116/75, 62  5/11/24 125/76, 56  5/9/24 123/70, 60  5/7/24 124/70, 62  5/5/24 125/74. 87 - after mowing the lawn         Today's Vitals: /56   ----------------      I spent 20 minutes with this patient today. All changes were made via collaborative practice agreement with Cabrera Bradshaw MD. A copy of the visit note was provided to the patient's provider(s).    A summary of these recommendations was given to the patient.    Heather Frazier, PharmD  Medication Therapy Management Pharmacist           Medication Therapy Recommendations  Hypertension goal BP (blood pressure) < 140/90    Current Medication: spironolactone (ALDACTONE) 25 MG tablet (Discontinued)   Rationale: Undesirable effect - Adverse medication event - Safety   Recommendation: Discontinue Medication   Status: Accepted per CPA

## 2024-05-15 NOTE — PATIENT INSTRUCTIONS
"Recommendations from today's MTM visit:                                                         1. Stop spironolactone.  2. Recheck blood pressure in 2-4 weeks.    Follow-up: Return in about 5 weeks (around 6/19/2024) for Medication Therapy Management.    It was great speaking with you today.  I value your experience and would be very thankful for your time in providing feedback in our clinic survey. In the next few days, you may receive an email or text message from [employer] with a link to a survey related to your  clinical pharmacist.\"     To schedule another MTM appointment, please call the clinic directly or you may call the MTM scheduling line at [phone] or toll-free at [phone].     My Clinical Pharmacist's contact information:                                                      Please feel free to contact me with any questions or concerns you have.      Heather Frazier, PharmD  Medication Therapy Management Pharmacist     "

## 2024-05-29 ENCOUNTER — OFFICE VISIT (OUTPATIENT)
Dept: FAMILY MEDICINE | Facility: CLINIC | Age: 84
End: 2024-05-29
Payer: COMMERCIAL

## 2024-05-29 VITALS
HEART RATE: 63 BPM | OXYGEN SATURATION: 98 % | WEIGHT: 186 LBS | HEIGHT: 70 IN | RESPIRATION RATE: 20 BRPM | TEMPERATURE: 97.6 F | SYSTOLIC BLOOD PRESSURE: 162 MMHG | BODY MASS INDEX: 26.63 KG/M2 | DIASTOLIC BLOOD PRESSURE: 78 MMHG

## 2024-05-29 DIAGNOSIS — Z12.5 SCREENING FOR PROSTATE CANCER: ICD-10-CM

## 2024-05-29 DIAGNOSIS — K40.90 LEFT INGUINAL HERNIA: ICD-10-CM

## 2024-05-29 DIAGNOSIS — Z01.818 PREOP GENERAL PHYSICAL EXAM: Primary | ICD-10-CM

## 2024-05-29 DIAGNOSIS — I10 ESSENTIAL HYPERTENSION WITH GOAL BLOOD PRESSURE LESS THAN 140/90: ICD-10-CM

## 2024-05-29 DIAGNOSIS — J44.9 CHRONIC OBSTRUCTIVE PULMONARY DISEASE, UNSPECIFIED COPD TYPE (H): ICD-10-CM

## 2024-05-29 DIAGNOSIS — E78.5 HYPERLIPIDEMIA LDL GOAL <100: ICD-10-CM

## 2024-05-29 DIAGNOSIS — I25.10 CORONARY ARTERY DISEASE INVOLVING NATIVE CORONARY ARTERY OF NATIVE HEART WITHOUT ANGINA PECTORIS: ICD-10-CM

## 2024-05-29 LAB
ANION GAP SERPL CALCULATED.3IONS-SCNC: 12 MMOL/L (ref 7–15)
BUN SERPL-MCNC: 12 MG/DL (ref 8–23)
CALCIUM SERPL-MCNC: 9.8 MG/DL (ref 8.8–10.2)
CHLORIDE SERPL-SCNC: 104 MMOL/L (ref 98–107)
CREAT SERPL-MCNC: 1.06 MG/DL (ref 0.67–1.17)
DEPRECATED HCO3 PLAS-SCNC: 26 MMOL/L (ref 22–29)
EGFRCR SERPLBLD CKD-EPI 2021: 70 ML/MIN/1.73M2
ERYTHROCYTE [DISTWIDTH] IN BLOOD BY AUTOMATED COUNT: 13 % (ref 10–15)
GLUCOSE SERPL-MCNC: 92 MG/DL (ref 70–99)
HCT VFR BLD AUTO: 42.5 % (ref 40–53)
HGB BLD-MCNC: 14 G/DL (ref 13.3–17.7)
MCH RBC QN AUTO: 33.3 PG (ref 26.5–33)
MCHC RBC AUTO-ENTMCNC: 32.9 G/DL (ref 31.5–36.5)
MCV RBC AUTO: 101 FL (ref 78–100)
PLATELET # BLD AUTO: 263 10E3/UL (ref 150–450)
POTASSIUM SERPL-SCNC: 4.4 MMOL/L (ref 3.4–5.3)
RBC # BLD AUTO: 4.2 10E6/UL (ref 4.4–5.9)
SODIUM SERPL-SCNC: 142 MMOL/L (ref 135–145)
WBC # BLD AUTO: 6.4 10E3/UL (ref 4–11)

## 2024-05-29 PROCEDURE — 80048 BASIC METABOLIC PNL TOTAL CA: CPT | Performed by: FAMILY MEDICINE

## 2024-05-29 PROCEDURE — 99214 OFFICE O/P EST MOD 30 MIN: CPT | Performed by: FAMILY MEDICINE

## 2024-05-29 PROCEDURE — G2211 COMPLEX E/M VISIT ADD ON: HCPCS | Performed by: FAMILY MEDICINE

## 2024-05-29 PROCEDURE — 36415 COLL VENOUS BLD VENIPUNCTURE: CPT | Performed by: FAMILY MEDICINE

## 2024-05-29 PROCEDURE — 85027 COMPLETE CBC AUTOMATED: CPT | Performed by: FAMILY MEDICINE

## 2024-05-29 ASSESSMENT — PAIN SCALES - GENERAL: PAINLEVEL: NO PAIN (0)

## 2024-05-29 NOTE — PROGRESS NOTES
[facility] Evaluation  [facility]  [address]  [place] [address]  Phone: [phone]  Primary Provider: Cabrera Bradshaw MD  Pre-op Performing Provider: Cabrera Bradshaw MD  May 29, 2024             5/28/2024   Surgical Information   What procedure is being done? HERNIORRHAPHY   Facility or Hospital where procedure/surgery will be performed: [facility] OR   Who is doing the procedure / surgery? Dr Bull Ramirez   Date of surgery / procedure: June 5, 2024   Time of surgery / procedure: 11:00 AM   Where do you plan to recover after surgery? at home with family     Fax number for surgical facility: Note does not need to be faxed, will be available electronically in Epic.    Assessment & Plan     The proposed surgical procedure is considered INTERMEDIATE risk.      ICD-10-CM    1. Preop general physical exam  Z01.818 Basic metabolic panel     CBC with platelets      2. Left inguinal hernia  K40.90       3. Coronary artery disease involving native coronary artery of native heart without angina pectoris  I25.10       4. Chronic obstructive pulmonary disease, unspecified COPD type (H)  J44.9       5. Essential hypertension with goal blood pressure less than 140/90  I10       6. Hyperlipidemia LDL goal <100  E78.5         His blood pressure was elevated today, but home blood pressure readings have generally been in the 120s to 130s over 70s.  He is currently working with our [facility] pharmacist to fine-tune his blood pressure medications.         - No identified additional risk factors other than previously addressed    Antiplatelet or Anticoagulation Medication Instructions   - aspirin: Discontinue aspirin 7-10 days prior to procedure to reduce bleeding risk. It should be resumed postoperatively.     Additional Medication Instructions  He will take the rest of his medications with a sip of water on the morning of surgery.    Recommendation  Approval given to proceed with  proposed procedure, without further diagnostic evaluation.        Subjective   Dominic is a 84 year old, presenting for the following:  Pre-Op Exam          5/29/2024    10:12 AM   Additional Questions   Roomed by cam         5/29/2024    10:12 AM   Patient Reported Additional Medications   Patient reports taking the following new medications none     HPI related to upcoming procedure: 84-year-old male has a symptomatic left inguinal hernia and is coming in now for surgical treatment of that.        5/28/2024   Pre-Op Questionnaire   Have you ever had a heart attack or stroke? (!) YES    Have you ever had surgery on your heart or blood vessels, such as a stent placement, a coronary artery bypass, or surgery on an artery in your head, neck, heart, or legs? (!) YES    Do you have chest pain with activity? No   Do you have a history of heart failure? (!) YES    Do you currently have a cold, bronchitis or symptoms of other infection? No   Do you have a cough, shortness of breath, or wheezing? (!) YES (has a history of mild COPD)   Do you or anyone in your family have previous history of blood clots? No   Do you or does anyone in your family have a serious bleeding problem such as prolonged bleeding following surgeries or cuts? No   Have you ever had problems with anemia or been told to take iron pills? No   Have you had any abnormal blood loss such as black, tarry or bloody stools? No   Have you ever had a blood transfusion? No   Are you willing to have a blood transfusion if it is medically needed before, during, or after your surgery? Yes   Have you or any of your relatives ever had problems with anesthesia? No   Do you have sleep apnea, excessive snoring or daytime drowsiness? No   Do you have any artifical heart valves or other implanted medical devices like a pacemaker, defibrillator, or continuous glucose monitor? No   Do you have artificial joints? No   Are you allergic to latex? No         Status of Chronic  Conditions:  See problem list for active medical problems.  Problems all longstanding and stable, except as noted/documented.  See ROS for pertinent symptoms related to these conditions.    Patient Active Problem List    Diagnosis Date Noted    Left inguinal hernia 03/11/2024     Priority: Medium    Actinic keratoses 05/09/2023     Priority: Medium    Hyperuricemia 08/24/2022     Priority: Medium    COPD (chronic obstructive pulmonary disease) (H) 04/02/2021     Priority: Medium    Coronary artery disease involving native coronary artery of native heart without angina pectoris 07/18/2016     Priority: Medium    Essential hypertension with goal blood pressure less than 140/90 07/12/2016     Priority: Medium    Idiopathic chronic gout without tophus, unspecified site 07/12/2016     Priority: Medium    FHx: prostate cancer 07/06/2015     Priority: Medium    Hyperlipidemia LDL goal <100 10/31/2013     Priority: Medium    History of non-ST elevation myocardial infarction (NSTEMI) 10/31/2013     Priority: Medium    Colorectal polyps 08/03/2010     Priority: Medium    Hypertension goal BP (blood pressure) < 140/90 05/01/2002     Priority: Medium      Past Medical History:   Diagnosis Date    CAD (coronary artery disease) 10/2013    s/p 3 stents -- NL stress test 12/4/20    Colorectal polyps 08/03/2010    anal polyp removed    COPD (chronic obstructive pulmonary disease) (H) 4/2/2021    FHx: prostate cancer     father    Gout 02/2008    Hyperlipidemia LDL goal < 100 04/2006    Hypertension goal BP (blood pressure) < 140/90 05/2002    NSTEMI (non-ST elevation myocardial infarction) (H) 10/2013    Squamous cell carcinoma 05/2008    RT CHEEK     Past Surgical History:   Procedure Laterality Date    CATARACT IOL, RT/LT  06/2009    right    CATARACT IOL, RT/LT  08/13    left    COLONOSCOPY      COLONOSCOPY N/A 8/31/2015    Procedure: COLONOSCOPY;  Surgeon: Francisco Horowitz MD;  Location: [facility] OR    COLONOSCOPY  09/29/2020  "   COLONOSCOPY WITH CO2 INSUFFLATION N/A 2015    Procedure: COLONOSCOPY WITH CO2 INSUFFLATION;  Surgeon: Francisco Horowitz MD;  Location: [facility] OR    removal of rectal polyp  08/10    STENT, CORONARY, SAMANTA  10/13    3 coronary stents s/p NSTEMI    TONSILLECTOMY & ADENOIDECTOMY      VASECTOMY  78     Current Outpatient Medications   Medication Sig Dispense Refill    ASPIRIN 81 MG PO TABS 1 TABLET DAILY      atorvastatin (LIPITOR) 40 MG tablet Take 1 tablet (40 mg) by mouth daily 90 tablet 3    diltiazem ER (DILT-XR) 180 MG 24 hr capsule Take 1 capsule (180 mg) by mouth daily 90 capsule 1    lisinopril (ZESTRIL) 40 MG tablet Take 1 tablet (40 mg) by mouth daily 90 tablet 3    MULTI-VITAMIN PO TABS 1 TABLET DAILY      nitroGLYcerin (NITROSTAT) 0.4 MG sublingual tablet Place 1 tablet (0.4 mg) under the tongue every 5 minutes as needed (Patient not taking: Reported on 3/6/2024) 25 tablet 1       No Known Allergies     Social History     Tobacco Use    Smoking status: Former     Current packs/day: 0.00     Average packs/day: 1 pack/day for 15.0 years (15.0 ttl pk-yrs)     Types: Cigarettes     Start date: 1959     Quit date: 1974     Years since quittin.4     Passive exposure: Past    Smokeless tobacco: Never   Substance Use Topics    Alcohol use: Yes     Comment: 2 glasses of wine per day      Family History   Problem Relation Age of Onset    Hypertension Mother     Breast Cancer Mother     Cancer - colorectal Father     Prostate Cancer Father     Cancer Brother         lymphoma    Hypertension Sister     Neurologic Disorder Maternal Grandmother     Cancer Paternal Grandfather      History   Drug Use No             Review of Systems  Noncontributory except as above.    Objective    BP (!) 162/78   Pulse 63   Temp 97.6  F (36.4  C) (Oral)   Resp 20   Ht 1.772 m (5' 9.76\")   Wt 84.4 kg (186 lb)   SpO2 98%   BMI 26.87 kg/m     Estimated body mass index is 26.87 kg/m  as calculated from " "the following:    Height as of this encounter: 1.772 m (5' 9.76\").    Weight as of this encounter: 84.4 kg (186 lb).  Physical Exam  GENERAL: alert and no distress  EYES: Eyes grossly normal to inspection, PERRL and conjunctivae and sclerae normal  HENT: Grossly normal  NECK: no adenopathy, no asymmetry, masses, or scars  RESP: lungs clear to auscultation - no rales, rhonchi or wheezes  CV: regular rate and rhythm, normal S1 S2, no S3 or S4, no murmur, click or rub, no peripheral edema  ABDOMEN: soft, nontender, no hepatosplenomegaly, no masses    (male): Not reexamined today, but has a mild to moderate sized left inguinal hernia  MS: no gross musculoskeletal defects noted, no edema  SKIN: no suspicious lesions or rashes  NEURO: Normal strength and tone, mentation intact and speech normal  PSYCH: mentation appears normal, affect normal/bright    Recent Labs   Lab Test 08/22/23  0844   HGB 15.0         POTASSIUM 4.5   CR 0.98        Diagnostics  Labs pending at this time.  Results will be reviewed when available.   No EKG this visit, completed in the last 90 days.  Had a normal stress test and echocardiogram in the last couple of months.    Revised Cardiac Risk Index (RCRI)  The patient has the following serious cardiovascular risks for perioperative complications:   - Coronary Artery Disease (MI, positive stress test, angina, Qs on EKG) = 1 point     RCRI Interpretation: 1 point: Class II (low risk - 0.9% complication rate)    The longitudinal plan of care for the diagnosis(es)/condition(s) as documented were addressed during this visit. Due to the added complexity in care, I will continue to support Dominic in the subsequent management and with ongoing continuity of care.       Signed Electronically by: Cabrera Bardshaw MD  Copy of this evaluation report is provided to requesting physician.         "

## 2024-05-30 NOTE — RESULT ENCOUNTER NOTE
Dominic,  These lab results are basically normal and look in fine shape for your planned hernia surgery next week.    Cabrrea Bradshaw MD

## 2024-06-04 ENCOUNTER — ANESTHESIA EVENT (OUTPATIENT)
Dept: SURGERY | Facility: AMBULATORY SURGERY CENTER | Age: 84
End: 2024-06-04
Payer: COMMERCIAL

## 2024-06-04 ASSESSMENT — LIFESTYLE VARIABLES: TOBACCO_USE: 1

## 2024-06-04 ASSESSMENT — COPD QUESTIONNAIRES: COPD: 1

## 2024-06-04 NOTE — ANESTHESIA PREPROCEDURE EVALUATION
Anesthesia Pre-Procedure Evaluation    Patient: Samy Henry   MRN: [id number] : 1940        Procedure : Procedure(s):  HERNIORRHAPHY, LEFT INGUINAL, ROBOT-ASSISTED, LAPAROSCOPIC, USING DA CARLOS ALBERTO XI left possible bilateral          Past Medical History:   Diagnosis Date    CAD (coronary artery disease) 10/2013    s/p 3 stents -- NL stress test 20    Colorectal polyps 2010    anal polyp removed    COPD (chronic obstructive pulmonary disease) (H) 2021    FHx: prostate cancer     father    Gout 2008    Hyperlipidemia LDL goal < 100 2006    Hypertension goal BP (blood pressure) < 140/90 2002    NSTEMI (non-ST elevation myocardial infarction) (H) 10/2013    Squamous cell carcinoma 2008    RT CHEEK      Past Surgical History:   Procedure Laterality Date    CATARACT IOL, RT/LT  2009    right    CATARACT IOL, RT/LT      left    COLONOSCOPY      COLONOSCOPY N/A 2015    Procedure: COLONOSCOPY;  Surgeon: Francisco Horowitz MD;  Location: [facility] OR    COLONOSCOPY  2020    COLONOSCOPY WITH CO2 INSUFFLATION N/A 2015    Procedure: COLONOSCOPY WITH CO2 INSUFFLATION;  Surgeon: Francisco Horowitz MD;  Location: [facility] OR    removal of rectal polyp  08/10    STENT, CORONARY, SAMANTA  10/13    3 coronary stents s/p NSTEMI    TONSILLECTOMY & ADENOIDECTOMY      VASECTOMY  78      No Known Allergies   Social History     Tobacco Use    Smoking status: Former     Current packs/day: 0.00     Average packs/day: 1 pack/day for 15.0 years (15.0 ttl pk-yrs)     Types: Cigarettes     Start date: 1959     Quit date: 1974     Years since quittin.4     Passive exposure: Past    Smokeless tobacco: Never   Substance Use Topics    Alcohol use: Yes     Comment: 2 glasses of wine per day       Wt Readings from Last 1 Encounters:   24 84.4 kg (186 lb)        Anesthesia Evaluation            ROS/MED HX  ENT/Pulmonary: Comment: FEV1/FVC 42% and FEV1 was only 1.2. Patient  "attributes SOB to medication induced asthma. Has SOB with walking inclines. No problems with walking long distances flat. Mowed the [place] yard behind a push mower yesterday. Has not been admitted for COPD.     (+)                tobacco use, Past use,        severe,  COPD,              Neurologic:    (-) no CVA   Cardiovascular:     (+)  hypertension- -  CAD - past MI - -                                 Previous cardiac testing   Echo: Date: Results:  2023   Final Conclusion    Normal left ventricular ejection fraction estimated at 60-65%.    No obvious regional wall motion abnormalities.    Left ventricular wall thickness at upper limits of normal.    Normal left atrial size.    Mild mitral regurgitation.    Estimated EF: 60-65%     Stress Test:  Date: Results:    ECG Reviewed:  Date: Results:    Cath:  Date: Results:   (-) OSCAR and syncope   METS/Exercise Tolerance:     Hematologic:    (-) history of blood clots   Musculoskeletal:       GI/Hepatic:    (-) esophageal disease   Renal/Genitourinary:    (-) renal disease   Endo:    (-) Type II DM   Psychiatric/Substance Use:    (-) chronic opioid use history   Infectious Disease:    (-) Recent Fever   Malignancy:       Other:               OUTSIDE LABS:  CBC:   Lab Results   Component Value Date    WBC 6.4 05/29/2024    WBC 6.9 08/22/2023    HGB 14.0 05/29/2024    HGB 15.0 08/22/2023    HCT 42.5 05/29/2024    HCT 44.0 08/22/2023     05/29/2024     08/22/2023     BMP:   Lab Results   Component Value Date     05/29/2024     08/22/2023    POTASSIUM 4.4 05/29/2024    POTASSIUM 4.5 08/22/2023    CHLORIDE 104 05/29/2024    CHLORIDE 105 08/22/2023    CO2 26 05/29/2024    CO2 25 08/22/2023    BUN 12.0 05/29/2024    BUN 13.9 08/22/2023    CR 1.06 05/29/2024    CR 0.98 08/22/2023    GLC 92 05/29/2024    GLC 95 08/22/2023     COAGS: No results found for: \"PTT\", \"INR\", \"FIBR\"  POC: No results found for: \"BGM\", \"HCG\", \"HCGS\"  HEPATIC:   Lab Results " "  Component Value Date    ALT 25 08/22/2023    AST 34 04/29/2014     OTHER:   Lab Results   Component Value Date    SEB 9.8 05/29/2024       Anesthesia Plan    ASA Status:  2    NPO Status:  Will be NPO Appropriate at ...    Anesthesia Type: General.     - Airway: ETT              Consents    Anesthesia Plan(s) and associated risks, benefits, and realistic alternatives discussed. Questions answered and patient/representative(s) expressed understanding.     - Discussed: Risks, Benefits and Alternatives for BOTH SEDATION and the PROCEDURE were discussed     - Discussed with:  Patient      - Extended Intubation/Ventilatory Support Discussed: No.      - Patient is DNR/DNI Status: No     Use of blood products discussed: No .     Postoperative Care       PONV prophylaxis: Ondansetron (or other 5HT-3), Background Propofol Infusion     Comments:               Bryce Moreno MD    I have reviewed the pertinent notes and labs in the chart from the past 30 days and (re)examined the patient.  Any updates or changes from those notes are reflected in this note.              # Overweight: Estimated body mass index is 26.87 kg/m  as calculated from the following:    Height as of 5/29/24: 1.772 m (5' 9.76\").    Weight as of 5/29/24: 84.4 kg (186 lb).      "

## 2024-06-05 ENCOUNTER — HOSPITAL ENCOUNTER (OUTPATIENT)
Facility: AMBULATORY SURGERY CENTER | Age: 84
Discharge: HOME | End: 2024-06-05
Attending: SURGERY
Payer: COMMERCIAL

## 2024-06-05 ENCOUNTER — ANESTHESIA (OUTPATIENT)
Dept: SURGERY | Facility: AMBULATORY SURGERY CENTER | Age: 84
End: 2024-06-05
Payer: COMMERCIAL

## 2024-06-05 VITALS
WEIGHT: 180 LBS | DIASTOLIC BLOOD PRESSURE: 62 MMHG | TEMPERATURE: 96.9 F | RESPIRATION RATE: 15 BRPM | OXYGEN SATURATION: 93 % | HEIGHT: 70 IN | BODY MASS INDEX: 25.77 KG/M2 | HEART RATE: 58 BPM | SYSTOLIC BLOOD PRESSURE: 127 MMHG

## 2024-06-05 DIAGNOSIS — K40.90 LEFT INGUINAL HERNIA: Primary | ICD-10-CM

## 2024-06-05 PROCEDURE — 49650 LAP ING HERNIA REPAIR INIT: CPT | Mod: 50 | Performed by: SURGERY

## 2024-06-05 PROCEDURE — 49650 LAP ING HERNIA REPAIR INIT: CPT | Performed by: ANESTHESIOLOGY

## 2024-06-05 PROCEDURE — S2900 ROBOTIC SURGICAL SYSTEM: HCPCS | Performed by: SURGERY

## 2024-06-05 PROCEDURE — 49650 LAP ING HERNIA REPAIR INIT: CPT | Performed by: NURSE ANESTHETIST, CERTIFIED REGISTERED

## 2024-06-05 PROCEDURE — 99100 ANES PT EXTEME AGE<1 YR&>70: CPT | Performed by: NURSE ANESTHETIST, CERTIFIED REGISTERED

## 2024-06-05 PROCEDURE — 99100 ANES PT EXTEME AGE<1 YR&>70: CPT | Performed by: ANESTHESIOLOGY

## 2024-06-05 DEVICE — MESH SURGICAL INGUINAL HRN REP MACROPOR 15CMX15CM  PPM1515X3: Type: IMPLANTABLE DEVICE | Site: ABDOMEN | Status: FUNCTIONAL

## 2024-06-05 RX ORDER — SODIUM CHLORIDE, SODIUM LACTATE, POTASSIUM CHLORIDE, CALCIUM CHLORIDE 600; 310; 30; 20 MG/100ML; MG/100ML; MG/100ML; MG/100ML
INJECTION, SOLUTION INTRAVENOUS CONTINUOUS
Status: DISCONTINUED | OUTPATIENT
Start: 2024-06-05 | End: 2024-06-05 | Stop reason: HOSPADM

## 2024-06-05 RX ORDER — LIDOCAINE 40 MG/G
CREAM TOPICAL
Status: DISCONTINUED | OUTPATIENT
Start: 2024-06-05 | End: 2024-06-05 | Stop reason: HOSPADM

## 2024-06-05 RX ORDER — ACETAMINOPHEN 325 MG/1
975 TABLET ORAL ONCE
Status: COMPLETED | OUTPATIENT
Start: 2024-06-05 | End: 2024-06-05

## 2024-06-05 RX ORDER — FENTANYL CITRATE 50 UG/ML
25 INJECTION, SOLUTION INTRAMUSCULAR; INTRAVENOUS EVERY 5 MIN PRN
Status: DISCONTINUED | OUTPATIENT
Start: 2024-06-05 | End: 2024-06-05 | Stop reason: HOSPADM

## 2024-06-05 RX ORDER — ONDANSETRON 2 MG/ML
4 INJECTION INTRAMUSCULAR; INTRAVENOUS EVERY 30 MIN PRN
Status: DISCONTINUED | OUTPATIENT
Start: 2024-06-05 | End: 2024-06-06 | Stop reason: HOSPADM

## 2024-06-05 RX ORDER — HYDROMORPHONE HYDROCHLORIDE 1 MG/ML
0.2 INJECTION, SOLUTION INTRAMUSCULAR; INTRAVENOUS; SUBCUTANEOUS EVERY 5 MIN PRN
Status: DISCONTINUED | OUTPATIENT
Start: 2024-06-05 | End: 2024-06-05 | Stop reason: HOSPADM

## 2024-06-05 RX ORDER — ONDANSETRON 2 MG/ML
4 INJECTION INTRAMUSCULAR; INTRAVENOUS EVERY 30 MIN PRN
Status: DISCONTINUED | OUTPATIENT
Start: 2024-06-05 | End: 2024-06-05 | Stop reason: HOSPADM

## 2024-06-05 RX ORDER — HYDROMORPHONE HYDROCHLORIDE 1 MG/ML
0.4 INJECTION, SOLUTION INTRAMUSCULAR; INTRAVENOUS; SUBCUTANEOUS EVERY 5 MIN PRN
Status: DISCONTINUED | OUTPATIENT
Start: 2024-06-05 | End: 2024-06-05 | Stop reason: HOSPADM

## 2024-06-05 RX ORDER — PROPOFOL 10 MG/ML
INJECTION, EMULSION INTRAVENOUS PRN
Status: DISCONTINUED | OUTPATIENT
Start: 2024-06-05 | End: 2024-06-05

## 2024-06-05 RX ORDER — BUPIVACAINE HYDROCHLORIDE AND EPINEPHRINE 5; 5 MG/ML; UG/ML
INJECTION, SOLUTION PERINEURAL PRN
Status: DISCONTINUED | OUTPATIENT
Start: 2024-06-05 | End: 2024-06-05 | Stop reason: HOSPADM

## 2024-06-05 RX ORDER — ONDANSETRON 2 MG/ML
INJECTION INTRAMUSCULAR; INTRAVENOUS PRN
Status: DISCONTINUED | OUTPATIENT
Start: 2024-06-05 | End: 2024-06-05

## 2024-06-05 RX ORDER — ACETAMINOPHEN 325 MG/1
325-650 TABLET ORAL EVERY 6 HOURS PRN
COMMUNITY
End: 2024-06-24

## 2024-06-05 RX ORDER — ONDANSETRON 4 MG/1
4 TABLET, ORALLY DISINTEGRATING ORAL EVERY 30 MIN PRN
Status: DISCONTINUED | OUTPATIENT
Start: 2024-06-05 | End: 2024-06-05 | Stop reason: HOSPADM

## 2024-06-05 RX ORDER — FENTANYL CITRATE 50 UG/ML
INJECTION, SOLUTION INTRAMUSCULAR; INTRAVENOUS PRN
Status: DISCONTINUED | OUTPATIENT
Start: 2024-06-05 | End: 2024-06-05

## 2024-06-05 RX ORDER — PROPOFOL 10 MG/ML
INJECTION, EMULSION INTRAVENOUS CONTINUOUS PRN
Status: DISCONTINUED | OUTPATIENT
Start: 2024-06-05 | End: 2024-06-05

## 2024-06-05 RX ORDER — OXYCODONE HYDROCHLORIDE 5 MG/1
5 TABLET ORAL
Status: DISCONTINUED | OUTPATIENT
Start: 2024-06-05 | End: 2024-06-06 | Stop reason: HOSPADM

## 2024-06-05 RX ORDER — LIDOCAINE HYDROCHLORIDE 20 MG/ML
INJECTION, SOLUTION INFILTRATION; PERINEURAL PRN
Status: DISCONTINUED | OUTPATIENT
Start: 2024-06-05 | End: 2024-06-05

## 2024-06-05 RX ORDER — NALOXONE HYDROCHLORIDE 0.4 MG/ML
0.1 INJECTION, SOLUTION INTRAMUSCULAR; INTRAVENOUS; SUBCUTANEOUS
Status: DISCONTINUED | OUTPATIENT
Start: 2024-06-05 | End: 2024-06-05 | Stop reason: HOSPADM

## 2024-06-05 RX ORDER — DEXAMETHASONE SODIUM PHOSPHATE 10 MG/ML
4 INJECTION, SOLUTION INTRAMUSCULAR; INTRAVENOUS
Status: DISCONTINUED | OUTPATIENT
Start: 2024-06-05 | End: 2024-06-05 | Stop reason: HOSPADM

## 2024-06-05 RX ORDER — DEXAMETHASONE SODIUM PHOSPHATE 4 MG/ML
INJECTION, SOLUTION INTRA-ARTICULAR; INTRALESIONAL; INTRAMUSCULAR; INTRAVENOUS; SOFT TISSUE PRN
Status: DISCONTINUED | OUTPATIENT
Start: 2024-06-05 | End: 2024-06-05

## 2024-06-05 RX ORDER — FENTANYL CITRATE 50 UG/ML
50 INJECTION, SOLUTION INTRAMUSCULAR; INTRAVENOUS EVERY 5 MIN PRN
Status: DISCONTINUED | OUTPATIENT
Start: 2024-06-05 | End: 2024-06-05 | Stop reason: HOSPADM

## 2024-06-05 RX ORDER — DEXAMETHASONE SODIUM PHOSPHATE 10 MG/ML
4 INJECTION, SOLUTION INTRAMUSCULAR; INTRAVENOUS
Status: DISCONTINUED | OUTPATIENT
Start: 2024-06-05 | End: 2024-06-06 | Stop reason: HOSPADM

## 2024-06-05 RX ORDER — NALOXONE HYDROCHLORIDE 0.4 MG/ML
0.1 INJECTION, SOLUTION INTRAMUSCULAR; INTRAVENOUS; SUBCUTANEOUS
Status: DISCONTINUED | OUTPATIENT
Start: 2024-06-05 | End: 2024-06-06 | Stop reason: HOSPADM

## 2024-06-05 RX ORDER — CEFAZOLIN SODIUM 2 G/50ML
2 SOLUTION INTRAVENOUS
Status: COMPLETED | OUTPATIENT
Start: 2024-06-05 | End: 2024-06-05

## 2024-06-05 RX ORDER — ONDANSETRON 4 MG/1
4 TABLET, ORALLY DISINTEGRATING ORAL EVERY 30 MIN PRN
Status: DISCONTINUED | OUTPATIENT
Start: 2024-06-05 | End: 2024-06-06 | Stop reason: HOSPADM

## 2024-06-05 RX ORDER — OXYCODONE HYDROCHLORIDE 5 MG/1
5-10 TABLET ORAL EVERY 4 HOURS PRN
Qty: 10 TABLET | Refills: 0 | Status: SHIPPED | OUTPATIENT
Start: 2024-06-05 | End: 2024-06-24

## 2024-06-05 RX ORDER — EPHEDRINE SULFATE 50 MG/ML
INJECTION, SOLUTION INTRAMUSCULAR; INTRAVENOUS; SUBCUTANEOUS PRN
Status: DISCONTINUED | OUTPATIENT
Start: 2024-06-05 | End: 2024-06-05

## 2024-06-05 RX ORDER — CEFAZOLIN SODIUM 2 G/50ML
2 SOLUTION INTRAVENOUS SEE ADMIN INSTRUCTIONS
Status: DISCONTINUED | OUTPATIENT
Start: 2024-06-05 | End: 2024-06-05 | Stop reason: HOSPADM

## 2024-06-05 RX ADMIN — Medication 10 MG: at 09:52

## 2024-06-05 RX ADMIN — EPHEDRINE SULFATE 5 MG: 50 INJECTION, SOLUTION INTRAMUSCULAR; INTRAVENOUS; SUBCUTANEOUS at 10:32

## 2024-06-05 RX ADMIN — Medication 50 MG: at 09:25

## 2024-06-05 RX ADMIN — DEXAMETHASONE SODIUM PHOSPHATE 4 MG: 4 INJECTION, SOLUTION INTRA-ARTICULAR; INTRALESIONAL; INTRAMUSCULAR; INTRAVENOUS; SOFT TISSUE at 09:30

## 2024-06-05 RX ADMIN — CEFAZOLIN SODIUM 2 G: 2 SOLUTION INTRAVENOUS at 09:32

## 2024-06-05 RX ADMIN — FENTANYL CITRATE 50 MCG: 50 INJECTION, SOLUTION INTRAMUSCULAR; INTRAVENOUS at 09:25

## 2024-06-05 RX ADMIN — LIDOCAINE HYDROCHLORIDE 80 MG: 20 INJECTION, SOLUTION INFILTRATION; PERINEURAL at 09:25

## 2024-06-05 RX ADMIN — ACETAMINOPHEN 975 MG: 325 TABLET ORAL at 08:28

## 2024-06-05 RX ADMIN — SODIUM CHLORIDE, SODIUM LACTATE, POTASSIUM CHLORIDE, CALCIUM CHLORIDE: 600; 310; 30; 20 INJECTION, SOLUTION INTRAVENOUS at 08:51

## 2024-06-05 RX ADMIN — PROPOFOL 150 MCG/KG/MIN: 10 INJECTION, EMULSION INTRAVENOUS at 09:25

## 2024-06-05 RX ADMIN — EPHEDRINE SULFATE 5 MG: 50 INJECTION, SOLUTION INTRAMUSCULAR; INTRAVENOUS; SUBCUTANEOUS at 10:29

## 2024-06-05 RX ADMIN — EPHEDRINE SULFATE 5 MG: 50 INJECTION, SOLUTION INTRAMUSCULAR; INTRAVENOUS; SUBCUTANEOUS at 10:38

## 2024-06-05 RX ADMIN — ONDANSETRON 4 MG: 2 INJECTION INTRAMUSCULAR; INTRAVENOUS at 09:30

## 2024-06-05 RX ADMIN — SODIUM CHLORIDE, SODIUM LACTATE, POTASSIUM CHLORIDE, CALCIUM CHLORIDE: 600; 310; 30; 20 INJECTION, SOLUTION INTRAVENOUS at 09:17

## 2024-06-05 RX ADMIN — PROPOFOL 100 MG: 10 INJECTION, EMULSION INTRAVENOUS at 09:25

## 2024-06-05 RX ADMIN — Medication 20 MG: at 10:20

## 2024-06-05 RX ADMIN — Medication 20 MG: at 09:38

## 2024-06-05 ASSESSMENT — COPD QUESTIONNAIRES: CAT_SEVERITY: SEVERE

## 2024-06-05 NOTE — ANESTHESIA POSTPROCEDURE EVALUATION
Patient: Samy Henry    Procedure: Procedure(s):  HERNIORRHAPHY, BILATERAL INGUINAL, ROBOT-ASSISTED, LAPAROSCOPIC, USING DA CARLOS ALBERTO XI       Anesthesia Type:  General    Note:  Disposition: Outpatient   Postop Pain Control: Uneventful            Sign Out: Well controlled pain   PONV: No   Neuro/Psych: Uneventful            Sign Out: Acceptable/Baseline neuro status   Airway/Respiratory: Uneventful            Sign Out: Acceptable/Baseline resp. status   CV/Hemodynamics: Uneventful            Sign Out: Acceptable CV status; No obvious hypovolemia; No obvious fluid overload   Other NRE: NONE   DID A NON-ROUTINE EVENT OCCUR? No           Last vitals:  Vitals Value Taken Time   /59 06/05/24 1115   Temp 36.1  C (96.9  F) 06/05/24 1115   Pulse 61 06/05/24 1115   Resp 16 06/05/24 1115   SpO2 92 % 06/05/24 1115       Electronically Signed By: Bryce Moreno MD  June 5, 2024  12:04 PM

## 2024-06-05 NOTE — ANESTHESIA PROCEDURE NOTES
Airway       Patient location during procedure: OR       Procedure Start/Stop Times: 6/5/2024 9:28 AM  Staff -        Anesthesiologist:  Bryce Moreno MD       CRNA: Liberty Castro APRN CRNA       Performed By: other anesthesia staff and resident  Consent for Airway        Urgency: elective  Indications and Patient Condition       Indications for airway management: yolis-procedural       Induction type:intravenous       Mask difficulty assessment: 1 - vent by mask    Final Airway Details       Final airway type: endotracheal airway       Successful airway: ETT - single and Oral  Endotracheal Airway Details        ETT size (mm): 8.0       Cuffed: yes       Cuff volume (mL): 6       Successful intubation technique: direct laryngoscopy       DL Blade Type: MAC 3       Grade View of Cords: 2       Adjucts: stylet       Position: Right       Measured from: lips       Secured at (cm): 22       Bite block used: None    Post intubation assessment        Placement verified by: capnometry, equal breath sounds and chest rise        Number of attempts at approach: 1       Number of other approaches attempted: 0       Secured with: tape       Ease of procedure: easy       Dentition: Intact and Unchanged    Medication(s) Administered   Medication Administration Time: 6/5/2024 9:28 AM

## 2024-06-05 NOTE — ANESTHESIA CARE TRANSFER NOTE
Patient: Samy Henry    Procedure: Procedure(s):  HERNIORRHAPHY, BILATERAL INGUINAL, ROBOT-ASSISTED, LAPAROSCOPIC, USING DA CARLOS ALBERTO XI       Diagnosis: Left inguinal hernia [K40.90]  Diagnosis Additional Information: No value filed.    Anesthesia Type:   General     Note:    Oropharynx: oropharynx clear of all foreign objects  Level of Consciousness: awake  Oxygen Supplementation: face mask    Independent Airway: airway patency satisfactory and stable  Dentition: dentition unchanged  Vital Signs Stable: post-procedure vital signs reviewed and stable  Report to RN Given: handoff report given  Patient transferred to: PACU    Handoff Report: Identifed the Patient, Identified the Reponsible Provider, Reviewed the pertinent medical history, Discussed the surgical course, Reviewed Intra-OP anesthesia mangement and issues during anesthesia, Set expectations for post-procedure period and Allowed opportunity for questions and acknowledgement of understanding      Vitals:  Vitals Value Taken Time   /66 06/05/24 1059   Temp 36  C (96.8  F) 06/05/24 1058   Pulse 61 06/05/24 1100   Resp 27 06/05/24 1100   SpO2 98 % 06/05/24 1100   Vitals shown include unfiled device data.    Electronically Signed By: NYDIA Prieto CRNA  June 5, 2024  11:01 AM

## 2024-06-05 NOTE — DISCHARGE INSTRUCTIONS
[facility]  Home Care Following Anesthesia  For 24 hours after surgery:  Get plenty of rest.  A responsible adult must stay with you for at least 24 hours after you leave the surgery center.  Do not drive or use heavy equipment.  If you have weakness or tingling, don't drive or use heavy equipment until this feeling goes away.   Do not drink alcohol.   Avoid strenuous or risky activities.  Ask for help when climbing stairs.  You may feel lightheaded.  IF so, sit for a few minutes before standing.  Have someone help you get up.   If you have nausea (feel sick to your stomach): Drink only clear liquids such as apple juice, ginger ale, broth or 7-Up.  Rest may also help.  Be sure to drink enough fluids.  Move to a regular diet as you feel able.   You may have a slight fever.  Call the doctor if your fever is over 100 F (37.7 C) (taken under the tongue) or lasts longer than 24 hours.  You may have a dry mouth, a sore throat, muscle aches or trouble sleeping. These should go away after 24 hours.  Do not make important or legal decisions.   It is recommended to avoid smoking.               Tips for taking pain medications  To get the best pain relief possible, remember these points:  Take pain medications as directed, before pain becomes severe.  Pain medication can upset your stomach: taking it with food may help.  Constipation is a common side effect of pain medication. Drink plenty of  fluids.  Eat foods high in fiber. Take a stool softener if recommended by your doctor or pharmacist.  Do not drink alcohol, drive or operate machinery while taking pain medications.  Ask about other ways to control pain, such as with heat, ice or relaxation.    Tylenol/Acetaminophen Consumption    If you feel your pain relief is insufficient, you may take Tylenol/Acetaminophen in addition to your narcotic pain medication.   Be careful not to exceed 4,000 mg of Tylenol/Acetaminophen in a 24 hour  period from all sources.  If you are taking extra strength Tylenol/acetaminophen (500 mg), the maximum dose is 8 tablets in 24 hours.  If you are taking regular strength acetaminophen (325 mg), the maximum dose is 12 tablets in 24 hours.    Call a doctor for any of the following:  Signs of infection (fever, growing tenderness at the surgery site, a large amount of drainage or bleeding, severe pain, foul-smelling drainage, redness, swelling).  It has been over 8 to 10 hours since surgery and you are still not able to urinate (pass water).  Headache for over 24 hours.  Numbness, tingling or weakness the day after surgery (if you had spinal anesthesia).  Signs of Covid-19 infection (temperature over 100 degrees, shortness of breath, cough, loss of taste/smell, generalized body aches, persistent headache, chills, sore throat, nausea/vomiting/diarrhea)  Your doctor is:       Dr. Bull Ramirez, General Surgery: [phone]               Or dial [phone] and ask for the resident on call for:  General Surgery  For emergency care, call the:  [place] [facility]:  [phone] (TTY for hearing impaired: [phone])

## 2024-06-06 NOTE — OP NOTE
Date of Service: 6/5/2024    STAFF SURGEON:  Bull Ramirez MD     ASSISTANT:  None.     PREOPERATIVE DIAGNOSIS:  left inguinal hernia     POSTOPERATIVE DIAGNOSIS:  bilateral inguinal hernia     NAME OF PROCEDURE(S):  Robotic Xi assisted laparoscopic bilateral inguinal hernia repair     INDICATIONS FOR PROCEDURE:  The patient is a 85 yo male with symptomatic left inguinal hernia. I offered a minimally invasive repair. Risks, benefits and alternatives discussed preoperatively and consent obtained.     EBL: 5 cc    ANESTHESIA:  General    COMPLICATIONS: None     DRAINS:  None.     SPECIMENS: none     IMPLANTS:   Implant Name Type Inv. Item Serial No.  Lot No. LRB No. Used Action   MESH SURGICAL INGUINAL HRN REP MACROPOR [id number]  [id number] - [id number] Mesh MESH SURGICAL INGUINAL HRN REP MACROPOR [id number]  [id number]  COVIDIEN [id number] Left 1 Implanted   MESH SURGICAL INGUINAL HRN REP MACROPOR [id number]  [id number] - [id number] Mesh MESH SURGICAL INGUINAL HRN REP MACROPOR [id number]  [id number]  COVIDIEN [id number] Right 1 Implanted       Cut to make about 10x15 cm formed mesh     OPERATIVE FINDINGS:  bilateral indirect inguinal hernias, right smaller.     PROCEDURE DETAIL:     Following consent, the patient was brought from the preoperative holding area to the operating suite and laid in supine position. The patient underwent general anesthesia. Abdomen was prepped and draped in usual fashion. Time out performed. Patient received antibiotics and had sequentials for DVT prophylaxis.    Following the time out I made a 2cm supraumbilical incision and dissected to the abdominal fascia which was elevated between two Kocher graspers and divided with curved rubin scissors. I placed 2 0 vicryl stay sutures on either side of the fascial defect then inserted the Malcom cannula. This was secured and attached to gas insufflation. I then inserted the camera into the abdomen and under direct vision placed the robotic 8mm  ports in the right and left abdomen. Patient was positioned Trendelenberg and I examed into the pelvis revealing the above hernia findings.   The [employer] Xi robot was then brought in and docked. I moved to the surgeon console. I began with creating a peritoneal flap on the left side and dissected in the preperitoneal space exposing David's ligament medially then expanding laterally. Peritoneum of the hernia sac was dissected down from the cord structures inferiorly enough to get under the mesh. I then expanded the pocket laterally until there was adequate space for the mesh.   I then proceeded in an identical fashion on the right side, connecting the preperitoneal spaces medially.   I used an approximately 10 x 15cm formed mesh to cover the areas with good overlap. Mesh secured with interrupted 3-0 vicryl sutures at David's ligament. The peritoneal flap was closed with running 3-0 Stratafix suture. Needles were removed and the robot undocked. I moved back to the patient bedside.   The ports were removed under direct vision and the abdomen desufflated. The supraumbilical fascial defect was closed with a figure of eight suture of 0 vicryl and the previously placed stay sutures. Skin incisions closed with 4-0 monocryl and covered with dermbond. Total of 30cc of 0.5% marcaine with epinephrine 1:200,000 used for the local. Final counts complete. Patient tolerated the procedure well and was discharge from the OR in a stable condition with plans for discharge home.           Bull Ramirez MD

## 2024-06-24 ENCOUNTER — OFFICE VISIT (OUTPATIENT)
Dept: SURGERY | Facility: CLINIC | Age: 84
End: 2024-06-24
Payer: COMMERCIAL

## 2024-06-24 ENCOUNTER — OFFICE VISIT (OUTPATIENT)
Dept: PHARMACY | Facility: CLINIC | Age: 84
End: 2024-06-24
Payer: COMMERCIAL

## 2024-06-24 VITALS
SYSTOLIC BLOOD PRESSURE: 128 MMHG | DIASTOLIC BLOOD PRESSURE: 66 MMHG | HEART RATE: 58 BPM | BODY MASS INDEX: 26.11 KG/M2 | WEIGHT: 182 LBS

## 2024-06-24 VITALS — SYSTOLIC BLOOD PRESSURE: 130 MMHG | HEART RATE: 157 BPM | DIASTOLIC BLOOD PRESSURE: 72 MMHG

## 2024-06-24 DIAGNOSIS — I25.10 CORONARY ARTERY DISEASE INVOLVING NATIVE CORONARY ARTERY OF NATIVE HEART WITHOUT ANGINA PECTORIS: ICD-10-CM

## 2024-06-24 DIAGNOSIS — Z09 S/P BILATERAL INGUINAL HERNIA REPAIR, FOLLOW-UP EXAM: Primary | ICD-10-CM

## 2024-06-24 DIAGNOSIS — I10 ESSENTIAL HYPERTENSION WITH GOAL BLOOD PRESSURE LESS THAN 140/90: Primary | ICD-10-CM

## 2024-06-24 DIAGNOSIS — Z87.19 S/P BILATERAL INGUINAL HERNIA REPAIR, FOLLOW-UP EXAM: Primary | ICD-10-CM

## 2024-06-24 PROCEDURE — 99024 POSTOP FOLLOW-UP VISIT: CPT | Performed by: SURGERY

## 2024-06-24 PROCEDURE — 99606 MTMS BY PHARM EST 15 MIN: CPT | Performed by: PHARMACIST

## 2024-06-24 NOTE — LETTER
6/24/2024      Samy Henry  [address] [place] [address]      Dear Colleague,    Thank you for referring your patient, Samy Henry, to the [facility]. Please see a copy of my visit note below.    General Surgery Post Op    Pt returns for follow up visit s/p robotic bilateral inguinal hernia repair on 6/5/2024.    Patient has been doing well, tolerating diet. Bowels moving well. Pain controlled. No issues with wound healing/redness/drainage. No fevers.      Physical exam: Vitals: /72   Pulse (!) 157   BMI= There is no height or weight on file to calculate BMI.    Exam:  Constitutional: healthy, alert, and no distress  Gastrointestinal: Abdomen soft, non-tender. BS normal. No masses, organomegaly  Incisions healing well no signs of infection.  Glue starting to loosen up.      Assessment:     ICD-10-CM    1. S/P bilateral inguinal hernia repair, follow-up exam  Z09     Z87.19         Plan: Recovering well from his bilateral inguinal hernia repair.  Okay to start increasing activities as tolerated.  Follow-up in clinic as needed.    Bull Ramirez MD      Again, thank you for allowing me to participate in the care of your patient.        Sincerely,        Bull Ramirez MD

## 2024-06-24 NOTE — PATIENT INSTRUCTIONS
"Recommendations from today's MTM visit:                                                         Ok to stop spironolactone again and monitor blood pressure again after two weeks.    Follow-up: Return in about 5 weeks (around 7/29/2024) for Medication Therapy Management.    It was great speaking with you today.  I value your experience and would be very thankful for your time in providing feedback in our clinic survey. In the next few days, you may receive an email or text message from [employer] with a link to a survey related to your  clinical pharmacist.\"     To schedule another MTM appointment, please call the clinic directly or you may call the MTM scheduling line at [phone] or toll-free at [phone].     My Clinical Pharmacist's contact information:                                                      Please feel free to contact me with any questions or concerns you have.      Heather Frazier, PharmD  Medication Therapy Management Pharmacist     "

## 2024-06-24 NOTE — Clinical Note
RISSA OSULLIVAN note, thanks!  Heather Frazier, PharmD Medication Therapy Management Pharmacist [phone]

## 2024-06-24 NOTE — PROGRESS NOTES
Medication Therapy Management (MTM) Encounter    ASSESSMENT:                            Medication Adherence/Access: No issues identified    Hypertension /CAD, 3 stents: blood pressure is at goal < 140/90, there was not a great difference in blood pressure whether he was on or off spironolactone - ok to stop this again and monitor blood pressure.     PLAN:                            Ok to stop spironolactone again and monitor blood pressure again after two weeks.    Follow-up: Return in about 5 weeks (around 7/29/2024) for Medication Therapy Management.    SUBJECTIVE/OBJECTIVE:                          Dominic Henry is a 84 year old male seen for a follow-up visit.       Reason for visit: blood pressure follow-up.    Allergies/ADRs: Reviewed in chart  Past Medical History: Reviewed in chart  Tobacco: He reports that he quit smoking about 50 years ago. His smoking use included cigarettes. He started smoking about 65 years ago. He has a 15 pack-year smoking history. He has been exposed to tobacco smoke. He has never used smokeless tobacco.  Alcohol: wine with dinner every night    Medication Adherence/Access:   Patient takes medications directly from bottles.  Patient takes medications 3 time(s) per day. -takes diltiazem by itself at 5 am.  Per patient, misses medication 0 times per week.   The patient fills medications at [place]: NO, fills medications at [facility].    Plan from last time:  1. Stop spironolactone.  2. Recheck blood pressure in 2-4 weeks.    Hypertension   /CAD, 3 stents:  Aspirin 81 mg daily with food  Diltiazem  mg daily (5 am) - reduced from 240 mg daily one month ago  Lisinopril 40 mg daily       He stopped spironolactone 12.5 mg daily on 5/16, blood pressure was increasing 136/82, 75; 134/76, 58, 135/79, 54, 135/86, 49, 143/80, 60 - he also had other readings that were higher in 140s, but he didn't include those, but that's why he decided to restart spironolactone and has been back on  since 6/5 (at least 3 weeks) When he was off the spironolactone he could sleep through the night without having to urinate quite a few nights. Most recent blood pressure readings:  6/23- 137/74, 67  6/21- 132/78, 58  6/20- 137/73, 65  6/15- 134/80, 63    Medication History:  Beta blockers (-olol drugs)contribute to shortness of breath, there was also some overlap with inhalers that had long-acting beta agonists in them which may have also exacerbated this.     Has continued slight distressed breathing when walking up hill carrying something, it goes away immediately when he stops, otherwise no real symptoms - has gotten better as we've backed off on some blood pressure medications (and off the olol drugs) - hasn't noticed a big change in this with reducing diltiazem or when he stopped the spironolactone, is the same.  Has previously seen pulmonology, possibly COPD, however Dominic reports he didn't think he did an accurate test.   Recently had work up done with cardiology and everything looked good.    Patient reports the following medication side effects: dizziness with positional changes, increased urination with spirolactone.   Patient self monitors blood pressure.  Home BP monitoring   5/15/24 123/69, 62  5/13/24 116/75, 62  5/11/24 125/76, 56  5/9/24 123/70, 60  5/7/24 124/70, 62  5/5/24 125/74. 87 - after mowing the lawn           Today's Vitals: /66   Pulse 58   Wt 182 lb (82.6 kg)   BMI 26.11 kg/m    ----------------      I spent 20 minutes with this patient today. All changes were made via collaborative practice agreement with Cabrera Bradshaw MD. A copy of the visit note was provided to the patient's provider(s).    A summary of these recommendations was sent via [employer].    Heather Frazier, LittleD  Medication Therapy Management Pharmacist         Medication Therapy Recommendations  No medication therapy recommendations to display

## 2024-06-24 NOTE — PROGRESS NOTES
General Surgery Post Op    Pt returns for follow up visit s/p robotic bilateral inguinal hernia repair on 6/5/2024.    Patient has been doing well, tolerating diet. Bowels moving well. Pain controlled. No issues with wound healing/redness/drainage. No fevers.      Physical exam: Vitals: /72   Pulse (!) 157   BMI= There is no height or weight on file to calculate BMI.    Exam:  Constitutional: healthy, alert, and no distress  Gastrointestinal: Abdomen soft, non-tender. BS normal. No masses, organomegaly  Incisions healing well no signs of infection.  Glue starting to loosen up.      Assessment:     ICD-10-CM    1. S/P bilateral inguinal hernia repair, follow-up exam  Z09     Z87.19         Plan: Recovering well from his bilateral inguinal hernia repair.  Okay to start increasing activities as tolerated.  Follow-up in clinic as needed.    Bull Ramirez MD

## 2024-07-31 ENCOUNTER — VIRTUAL VISIT (OUTPATIENT)
Dept: PHARMACY | Facility: CLINIC | Age: 84
End: 2024-07-31
Payer: COMMERCIAL

## 2024-07-31 DIAGNOSIS — I25.10 CORONARY ARTERY DISEASE INVOLVING NATIVE CORONARY ARTERY OF NATIVE HEART WITHOUT ANGINA PECTORIS: ICD-10-CM

## 2024-07-31 DIAGNOSIS — I10 ESSENTIAL HYPERTENSION WITH GOAL BLOOD PRESSURE LESS THAN 140/90: Primary | ICD-10-CM

## 2024-07-31 PROCEDURE — 99606 MTMS BY PHARM EST 15 MIN: CPT | Mod: 93 | Performed by: PHARMACIST

## 2024-07-31 PROCEDURE — 99607 MTMS BY PHARM ADDL 15 MIN: CPT | Mod: 93 | Performed by: PHARMACIST

## 2024-07-31 NOTE — PATIENT INSTRUCTIONS
"Recommendations from today's MTM visit:                                                         1. With history of stents, please restart Aspirin 81 mg daily and take long-term.    Follow-up: Return in about 1 year (around 7/31/2025) for Medication Therapy Management.    It was great speaking with you today.  I value your experience and would be very thankful for your time in providing feedback in our clinic survey. In the next few days, you may receive an email or text message from [employer] with a link to a survey related to your  clinical pharmacist.\"     To schedule another MTM appointment, please call the clinic directly or you may call the MTM scheduling line at [phone] or toll-free at [phone].     My Clinical Pharmacist's contact information:                                                      Please feel free to contact me with any questions or concerns you have.      Heather Frazier, PharmD  Medication Therapy Management Pharmacist     "

## 2024-07-31 NOTE — Clinical Note
RISSA OSULLIVAN note, thanks!  Heather Frazier, PharmD Medication Therapy Management Pharmacist [phone]

## 2024-07-31 NOTE — PROGRESS NOTES
Medication Therapy Management (MTM) Encounter    ASSESSMENT:                            Medication Adherence/Access: No issues identified    Hypertension /CAD, 3 stents: blood pressure is at goal < 140/90, ok to remain off spironolactone. May benefit from restarting aspirin.    PLAN:                            1. With history of stents, please restart Aspirin 81 mg daily and take long-term.    Follow-up: Return in about 1 year (around 7/31/2025) for Medication Therapy Management.    SUBJECTIVE/OBJECTIVE:                          Dominic Henry is a 84 year old male seen for a follow-up visit.       Reason for visit: blood pressure follow-up.    Allergies/ADRs: Reviewed in chart  Past Medical History: Reviewed in chart  Tobacco: He reports that he quit smoking about 50 years ago. His smoking use included cigarettes. He started smoking about 65 years ago. He has a 15 pack-year smoking history. He has been exposed to tobacco smoke. He has never used smokeless tobacco.  Alcohol: wine with dinner every night    Medication Adherence/Access:   Patient takes medications directly from bottles.  Patient takes medications 3 time(s) per day. -takes diltiazem by itself at 5 am.  Per patient, misses medication 0 times per week.   The patient fills medications at [place]: NO, fills medications at [facility].    Hypertension/CAD, 3 stents:  Aspirin 81 mg daily with food -he stopped prior to surgery on 6/5 - hasn't restarted yet  Diltiazem  mg daily (5 am) - reduced from 240 mg daily two months ago  Lisinopril 40 mg daily     He's been off spironolactone x 1 month.  Distressed breathing has improved, been walking a lot (nothing like when he used to be on the -olol drugs).  Has previously seen pulmonology, possibly COPD, however Dominic reports he didn't think he did an accurate test.   Recently had work up done with cardiology and everything looked good.  Patient reports the following medication side effects: none  Patient self  monitors blood pressure.  Home BP monitoring     Medication History:  Beta blockers (-olol drugs)contribute to shortness of breath, there was also some overlap with inhalers that had long-acting beta agonists in them which may have also exacerbated this.   Spironolactone - tolerated well, increased urination          Today's Vitals: There were no vitals taken for this visit.  ----------------      I spent 5 minutes with this patient today. All changes were made via collaborative practice agreement with Cabrera Bradshaw MD. A copy of the visit note was provided to the patient's provider(s).    A summary of these recommendations was sent via [employer].    Heather Frazier, LittleD  Medication Therapy Management Pharmacist    Telemedicine Visit Details  Type of service:  Telephone visit  Start Time: 8:00 AM  End Time:  8:05 am     Medication Therapy Recommendations  History of non-ST elevation myocardial infarction (NSTEMI)    Current Medication: ASPIRIN 81 MG PO TABS   Rationale: Patient forgets to take - Adherence - Adherence   Recommendation: Provide Adherence Intervention   Status: Patient Agreed - Adherence/Education

## 2024-08-18 NOTE — RESULT ENCOUNTER NOTE
Clinic Care Coordination Contact    incoming call from the patient.    States she hasnt checked the mail this CCSW sent her yet but did receive it. Wanted a reminder what the senior care program offered. CCSW stated she could call the program directly or ask her home care  for support to fill out the application if needed.    No other questions at this time.    Stacey Aguayo, Social Work Care Coordinator, [facility], [facility]: Phone [phone]    Clinic Care Coordination Contact  [facility]/Voicemail    Clinical Data: follow up on senior [facility] care program    Pt has fv homecare and sw involved currently    Outreach attempted x 1.  Left message on voicemail with call back information and requested return call.  Plan: Care coordinator explained why she was calling. Stated this CCSW's last day is July 6th so no further outreaches will be made. Encouraged the pt to call before then to follow up. Will route a message to [facility] about closure and follow up.    Stacey Aguayo, Social Work Care Coordinator, [facility], [facility]: [place], [place], and [place]   P:[phone] / Signed June 28, 2018       Patient will be coming in soon for office visit and lab/test review.    Cabrera Bradshaw MD   [employer]

## 2024-08-29 ENCOUNTER — LAB (OUTPATIENT)
Dept: LAB | Facility: CLINIC | Age: 84
End: 2024-08-29
Payer: COMMERCIAL

## 2024-08-29 DIAGNOSIS — Z12.5 SCREENING FOR PROSTATE CANCER: ICD-10-CM

## 2024-08-29 DIAGNOSIS — E78.5 HYPERLIPIDEMIA LDL GOAL <100: ICD-10-CM

## 2024-08-29 DIAGNOSIS — I10 ESSENTIAL HYPERTENSION WITH GOAL BLOOD PRESSURE LESS THAN 140/90: ICD-10-CM

## 2024-08-29 LAB
ANION GAP SERPL CALCULATED.3IONS-SCNC: 13 MMOL/L (ref 7–15)
BUN SERPL-MCNC: 17.8 MG/DL (ref 8–23)
CALCIUM SERPL-MCNC: 10.5 MG/DL (ref 8.8–10.4)
CHLORIDE SERPL-SCNC: 107 MMOL/L (ref 98–107)
CHOLEST SERPL-MCNC: 139 MG/DL
CREAT SERPL-MCNC: 0.99 MG/DL (ref 0.67–1.17)
EGFRCR SERPLBLD CKD-EPI 2021: 76 ML/MIN/1.73M2
ERYTHROCYTE [DISTWIDTH] IN BLOOD BY AUTOMATED COUNT: 13.5 % (ref 10–15)
FASTING STATUS PATIENT QL REPORTED: YES
FASTING STATUS PATIENT QL REPORTED: YES
GLUCOSE SERPL-MCNC: 104 MG/DL (ref 70–99)
HCO3 SERPL-SCNC: 25 MMOL/L (ref 22–29)
HCT VFR BLD AUTO: 45.7 % (ref 40–53)
HDLC SERPL-MCNC: 56 MG/DL
HGB BLD-MCNC: 15.2 G/DL (ref 13.3–17.7)
LDLC SERPL CALC-MCNC: 69 MG/DL
MCH RBC QN AUTO: 32.7 PG (ref 26.5–33)
MCHC RBC AUTO-ENTMCNC: 33.3 G/DL (ref 31.5–36.5)
MCV RBC AUTO: 98 FL (ref 78–100)
NONHDLC SERPL-MCNC: 83 MG/DL
PLATELET # BLD AUTO: 297 10E3/UL (ref 150–450)
POTASSIUM SERPL-SCNC: 4.5 MMOL/L (ref 3.4–5.3)
PSA SERPL DL<=0.01 NG/ML-MCNC: 4.25 NG/ML
RBC # BLD AUTO: 4.65 10E6/UL (ref 4.4–5.9)
SODIUM SERPL-SCNC: 145 MMOL/L (ref 135–145)
TRIGL SERPL-MCNC: 71 MG/DL
WBC # BLD AUTO: 7.2 10E3/UL (ref 4–11)

## 2024-08-29 PROCEDURE — 36415 COLL VENOUS BLD VENIPUNCTURE: CPT

## 2024-08-29 PROCEDURE — 80048 BASIC METABOLIC PNL TOTAL CA: CPT

## 2024-08-29 PROCEDURE — 80061 LIPID PANEL: CPT

## 2024-08-29 PROCEDURE — 85027 COMPLETE CBC AUTOMATED: CPT

## 2024-08-29 PROCEDURE — G0103 PSA SCREENING: HCPCS

## 2024-09-04 ENCOUNTER — OFFICE VISIT (OUTPATIENT)
Dept: FAMILY MEDICINE | Facility: CLINIC | Age: 84
End: 2024-09-04
Attending: FAMILY MEDICINE
Payer: COMMERCIAL

## 2024-09-04 VITALS
RESPIRATION RATE: 16 BRPM | HEIGHT: 70 IN | WEIGHT: 180 LBS | TEMPERATURE: 97.8 F | OXYGEN SATURATION: 96 % | HEART RATE: 75 BPM | DIASTOLIC BLOOD PRESSURE: 72 MMHG | BODY MASS INDEX: 25.77 KG/M2 | SYSTOLIC BLOOD PRESSURE: 154 MMHG

## 2024-09-04 DIAGNOSIS — E78.5 HYPERLIPIDEMIA LDL GOAL <100: ICD-10-CM

## 2024-09-04 DIAGNOSIS — J44.9 CHRONIC OBSTRUCTIVE PULMONARY DISEASE, UNSPECIFIED COPD TYPE (H): ICD-10-CM

## 2024-09-04 DIAGNOSIS — R73.01 IMPAIRED FASTING GLUCOSE: ICD-10-CM

## 2024-09-04 DIAGNOSIS — I10 ESSENTIAL HYPERTENSION WITH GOAL BLOOD PRESSURE LESS THAN 140/90: ICD-10-CM

## 2024-09-04 DIAGNOSIS — I25.10 CORONARY ARTERY DISEASE INVOLVING NATIVE CORONARY ARTERY OF NATIVE HEART WITHOUT ANGINA PECTORIS: ICD-10-CM

## 2024-09-04 DIAGNOSIS — Z00.00 ENCOUNTER FOR MEDICARE ANNUAL WELLNESS EXAM: Primary | ICD-10-CM

## 2024-09-04 PROBLEM — K40.90 LEFT INGUINAL HERNIA: Status: RESOLVED | Noted: 2024-03-11 | Resolved: 2024-09-04

## 2024-09-04 PROCEDURE — G0439 PPPS, SUBSEQ VISIT: HCPCS | Performed by: FAMILY MEDICINE

## 2024-09-04 PROCEDURE — 99213 OFFICE O/P EST LOW 20 MIN: CPT | Mod: 25 | Performed by: FAMILY MEDICINE

## 2024-09-04 RX ORDER — ATORVASTATIN CALCIUM 40 MG/1
40 TABLET, FILM COATED ORAL DAILY
Qty: 90 TABLET | Refills: 3 | Status: SHIPPED | OUTPATIENT
Start: 2024-09-04

## 2024-09-04 RX ORDER — DILTIAZEM HYDROCHLORIDE 180 MG/1
180 CAPSULE, EXTENDED RELEASE ORAL DAILY
Qty: 90 CAPSULE | Refills: 3 | Status: SHIPPED | OUTPATIENT
Start: 2024-09-04

## 2024-09-04 RX ORDER — LISINOPRIL 40 MG/1
40 TABLET ORAL DAILY
Qty: 90 TABLET | Refills: 3 | Status: SHIPPED | OUTPATIENT
Start: 2024-09-04

## 2024-09-04 ASSESSMENT — PAIN SCALES - GENERAL: PAINLEVEL: NO PAIN (0)

## 2024-09-04 NOTE — PROGRESS NOTES
"Preventive Care Visit  [facility]  Cabrera Bradshaw MD, Family Medicine  Sep 4, 2024      Assessment & Plan       ICD-10-CM    1. Encounter for Medicare annual wellness exam  Z00.00       2. Hyperlipidemia LDL goal <100  E78.5 atorvastatin (LIPITOR) 40 MG tablet      3. Essential hypertension with goal blood pressure less than 140/90  I10 diltiazem ER (DILT-XR) 180 MG 24 hr capsule     lisinopril (ZESTRIL) 40 MG tablet      4. Coronary artery disease involving native coronary artery of native heart without angina pectoris  I25.10       5. 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    Weight as of this encounter: 81.6 kg (180 lb).       Counseling  Appropriate preventive services were addressed with this patient via screening, questionnaire, or discussion as appropriate for fall prevention, nutrition, physical activity, Tobacco-use cessation, social engagement, weight loss and cognition.  Checklist reviewing preventive services available has been given to the patient.  Reviewed patient's diet, addressing concerns and/or questions.   The patient reports drinking more than 3 alcoholic drinks per day and/or more than 7 drhnks per week. The patient was counseled and given information about possible harmful effects of excessive alcohol intake.      Subjective   Dominic " is a 84 year old, presenting for the following:  Physical        9/4/2024     9:55 AM   Additional Questions   Roomed by cam   Accompanied by none         9/4/2024     9:55 AM   Patient Reported Additional Medications   Patient reports taking the following new medications none         Health Care Directive  Patient does not have a Health Care Directive or Living Will: Discussed advance care planning with patient; information given to patient to review.    HPI    He remains on baseline medications as below.  He feels like they are working well for him.  He has been on various other blood pressure meds in the past which have either caused side effects or have not worked well for him.  He does check blood pressure and heart rate readings on a regular basis at home and they have generally been doing well.  He has no new complaints or concerns.  He has healed up well from his hernia surgery.  See previous notes on that.  He is anticipating having eyelid surgery at some point in the upcoming months.  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      8/30/2024   General Health   How would you rate your overall physical health? Good   Feel stress (tense, anxious, or unable to sleep) Not at all            8/30/2024   Nutrition   Diet: Regular (no restrictions)            8/30/2024   Exercise   Days per week of moderate/strenous exercise 4 days   Average minutes spent exercising at this level 90 min            8/30/2024   Social Factors   Frequency of gathering with friends or relatives Once a week   Worry food won't last until get money to buy more No   Food not last or not have enough money for food? No   Do you have housing? (Housing is defined as stable permanent housing and does not include staying ouside in a car, in a tent, in an abandoned building, in an overnight shelter, or couch-surfing.) Yes   Are you worried about losing your housing? No   Lack of transportation?  No   Unable to get utilities (heat,electricity)? No            8/30/2024   Fall Risk   Fallen 2 or more times in the past year? No    No   Trouble with walking or balance? No    No       Multiple values from one day are sorted in reverse-chronological order          8/30/2024   Activities of Daily Living- Home Safety   Needs help with the following daily activites None of the above   Safety concerns in the home None of the above            8/30/2024   Dental   Dentist two times every year? Yes            8/30/2024   Hearing Screening   Hearing concerns? None of the above            8/30/2024   Driving Risk Screening   Patient/family members have concerns about driving No            8/30/2024   General Alertness/Fatigue Screening   Have you been more tired than usual lately? No            8/30/2024   Urinary Incontinence Screening   Bothered by leaking urine in past 6 months No            8/30/2024   TB Screening   Were you born outside of the [place]? No            Today's PHQ-2 Score:       9/3/2024    10:57 AM   PHQ-2 ( 1999 [employer])   Q1: Little interest or pleasure in doing things 0   Q2: Feeling down, depressed or hopeless 0   PHQ-2 Score 0   Q1: Little interest or pleasure in doing things Not at all   Q2: Feeling down, depressed or hopeless Not at all   PHQ-2 Score 0           8/30/2024   Substance Use   Alcohol more than 3/day or more than 7/wk Yes   How often do you have a drink containing alcohol 4 or more times a week   How many alcohol drinks on typical day 1 or 2   How often do you have 5+ drinks at one occasion Never   Audit 2/3 Score 0   How often not able to stop drinking once started Never   How often failed to do what normally expected Never   How often needed first drink in am after a heavy drinking session Never   How often feeling of guilt or remorse after drinking Never   How often unable to remember what happened the night before Never   Have you or someone else been injured because of your drinking  No   Has anyone been concerned or suggested you cut down on drinking No   TOTAL SCORE - AUDIT 4   Do you have a current opioid prescription? No   How severe/bad is pain from 1 to 10? 0/10 (No Pain)   Do you use any other substances recreationally? No        Social History     Tobacco Use    Smoking status: Former     Current packs/day: 0.00     Average packs/day: 1 pack/day for 15.0 years (15.0 ttl pk-yrs)     Types: Cigarettes     Start date: 1959     Quit date: 1974     Years since quittin.7     Passive exposure: Past    Smokeless tobacco: Never   Vaping Use    Vaping status: Never Used   Substance Use Topics    Alcohol use: Yes     Comment: 2 glasses of wine per day     Drug use: No                 Reviewed and updated as needed this visit by Provider                    Patient Active Problem List   Diagnosis    Colorectal polyps    Hypertension goal BP (blood pressure) < 140/90    Hyperlipidemia LDL goal <100    History of non-ST elevation myocardial infarction (NSTEMI)    FHx: prostate cancer    Essential hypertension with goal blood pressure less than 140/90    Idiopathic chronic gout without tophus, unspecified site    Coronary artery disease involving native coronary artery of native heart without angina pectoris    COPD (chronic obstructive pulmonary disease) (H)    Hyperuricemia    Actinic keratoses    Left inguinal hernia     Past Surgical History:   Procedure Laterality Date    CATARACT IOL, RT/LT  2009    right    CATARACT IOL, RT/LT      left    COLONOSCOPY      COLONOSCOPY N/A 2015    Procedure: COLONOSCOPY;  Surgeon: Francisoc Horowitz MD;  Location: [facility] OR    COLONOSCOPY  2020    COLONOSCOPY WITH CO2 INSUFFLATION N/A 2015    Procedure: COLONOSCOPY WITH CO2 INSUFFLATION;  Surgeon: Francisco Horowitz MD;  Location:  OR    STEAFNIE CHAUDHARI HERNIORRHAPHY INGUINAL Bilateral 2024    Procedure: HERNIORRHAPHY, BILATERAL INGUINAL, ROBOT-ASSISTED, LAPAROSCOPIC,  USING DA CARLOS ALBERTO XI;  Surgeon: Bull Ramirez MD;  Location: [facility] OR    removal of rectal polyp  08/10    STENT, CORONARY, SAMANTA  10/13    3 coronary stents s/p NSTEMI    TONSILLECTOMY & ADENOIDECTOMY      VASECTOMY  79       Social History     Tobacco Use    Smoking status: Former     Current packs/day: 0.00     Average packs/day: 1 pack/day for 15.0 years (15.0 ttl pk-yrs)     Types: Cigarettes     Start date: 1959     Quit date: 1974     Years since quittin.7     Passive exposure: Past    Smokeless tobacco: Never   Substance Use Topics    Alcohol use: Yes     Comment: 2 glasses of wine per day      Family History   Problem Relation Age of Onset    Hypertension Mother     Breast Cancer Mother     Cancer - colorectal Father     Prostate Cancer Father     Cancer Brother         lymphoma    Hypertension Sister     Neurologic Disorder Maternal Grandmother     Cancer Paternal Grandfather          Current Outpatient Medications   Medication Sig Dispense Refill    ASPIRIN 81 MG PO TABS 1 TABLET DAILY      atorvastatin (LIPITOR) 40 MG tablet Take 1 tablet (40 mg) by mouth daily. 90 tablet 3    diltiazem ER (DILT-XR) 180 MG 24 hr capsule Take 1 capsule (180 mg) by mouth daily. 90 capsule 3    lisinopril (ZESTRIL) 40 MG tablet Take 1 tablet (40 mg) by mouth daily. 90 tablet 3    MULTI-VITAMIN PO TABS 1 TABLET DAILY      nitroGLYcerin (NITROSTAT) 0.4 MG sublingual tablet Place 1 tablet (0.4 mg) under the tongue every 5 minutes as needed (Patient not taking: Reported on 2024) 25 tablet 1     No Known Allergies  Current providers sharing in care for this patient include:  Patient Care Team:  Cabrera Bradshaw MD as PCP - General  Cabrera Bradshaw MD as Referring Physician (Family Medicine)  Michele uGillen MD as MD (Internal Medicine)  Cabrera Bradshaw MD as Assigned PCP  Roro Colon MD as MD (Otolaryngology)  GLORIA Aguila MD as MD (Cardiovascular Disease)  Dannielle Frazier,  "[facility] as Pharmacist (Pharmacist Ambulatory Care)  Bull Ramirez MD as Assigned Surgical Provider  Dannielle Frazier RPH as Assigned MTM Pharmacist  GLORIA Aguila MD as Assigned Heart and Vascular Provider    The following health maintenance items are reviewed in Epic and correct as of today:  Health Maintenance   Topic Date Due    RSV VACCINE (1 - 1-dose 60+ series) Never done    ZOSTER IMMUNIZATION (1 of 2) 07/03/2009    ANNUAL REVIEW OF HM ORDERS  08/25/2024    INFLUENZA VACCINE (1) 09/01/2024    COVID-19 Vaccine (7 - 2023-24 season) 09/01/2024    MEDICARE ANNUAL WELLNESS VISIT  08/25/2024    DTAP/TDAP/TD IMMUNIZATION (1 - Tdap) 07/23/2029 (Originally 7/24/2019)    BMP  08/29/2025    LIPID  08/29/2025    FALL RISK ASSESSMENT  09/04/2025    COLORECTAL CANCER SCREENING  09/29/2025    ADVANCE CARE PLANNING  02/28/2029    SPIROMETRY  Completed    COPD ACTION PLAN  Completed    PHQ-2 (once per calendar year)  Completed    Pneumococcal Vaccine: 65+ Years  Completed    HPV IMMUNIZATION  Aged Out    MENINGITIS IMMUNIZATION  Aged Out    RSV MONOCLONAL ANTIBODY  Aged Out         Review of Systems  Constitutional, HEENT, cardiovascular, pulmonary, gi and gu systems are negative, except as otherwise noted.     Objective    Exam  BP (!) 154/72   Pulse 75   Temp 97.8  F (36.6  C) (Temporal)   Resp 16   Ht 1.77 m (5' 9.69\")   Wt 81.6 kg (180 lb)   SpO2 96%   BMI 26.06 kg/m     Estimated body mass index is 26.06 kg/m  as calculated from the following:    Height as of this encounter: 1.77 m (5' 9.69\").    Weight as of this encounter: 81.6 kg (180 lb).    Physical Exam  GENERAL: alert and no distress  EYES: Eyes grossly normal to inspection, PERRL and conjunctivae and sclerae normal  HENT: ear canals and TM's normal, nose and mouth without ulcers or lesions  NECK: no adenopathy, no asymmetry, masses, or scars  RESP: lungs clear to auscultation - no rales, rhonchi or wheezes  CV: regular rate and rhythm, normal S1 " S2, no S3 or S4, no murmur, click or rub, no peripheral edema  ABDOMEN: soft, nontender, no hepatosplenomegaly, no masses and bowel sounds normal  MS: no gross musculoskeletal defects noted, no edema  SKIN: no suspicious lesions or rashes  NEURO: Normal strength and tone, mentation intact and speech normal  PSYCH: mentation appears normal, affect normal/bright        9/4/2024   Mini Cog   Clock Draw Score 2 Normal   3 Item Recall 3 objects recalled   Mini Cog Total Score 5                 Lab on 08/29/2024   Component Date Value Ref Range Status    Sodium 08/29/2024 145  135 - 145 mmol/L Final    Potassium 08/29/2024 4.5  3.4 - 5.3 mmol/L Final    Chloride 08/29/2024 107  98 - 107 mmol/L Final    Carbon Dioxide (CO2) 08/29/2024 25  22 - 29 mmol/L Final    Anion Gap 08/29/2024 13  7 - 15 mmol/L Final    Urea Nitrogen 08/29/2024 17.8  8.0 - 23.0 mg/dL Final    Creatinine 08/29/2024 0.99  0.67 - 1.17 mg/dL Final    GFR Estimate 08/29/2024 76  >60 mL/min/1.73m2 Final    eGFR calculated using 2021 CKD-EPI equation.    Calcium 08/29/2024 10.5 (H)  8.8 - 10.4 mg/dL Final    Reference intervals for this test were updated on 7/16/2024 to reflect our healthy population more accurately. There may be differences in the flagging of prior results with similar values performed with this method. Those prior results can be interpreted in the context of the updated reference intervals.    Glucose 08/29/2024 104 (H)  70 - 99 mg/dL Final    Patient Fasting > 8hrs? 08/29/2024 Yes   Final    Cholesterol 08/29/2024 139  <200 mg/dL Final    Triglycerides 08/29/2024 71  <150 mg/dL Final    Direct Measure HDL 08/29/2024 56  >=40 mg/dL Final    LDL Cholesterol Calculated 08/29/2024 69  <=100 mg/dL Final    Non HDL Cholesterol 08/29/2024 83  <130 mg/dL Final    Patient Fasting > 8hrs? 08/29/2024 Yes   Final    WBC Count 08/29/2024 7.2  4.0 - 11.0 10e3/uL Final    RBC Count 08/29/2024 4.65  4.40 - 5.90 10e6/uL Final    Hemoglobin 08/29/2024  15.2  13.3 - 17.7 g/dL Final    Hematocrit 08/29/2024 45.7  40.0 - 53.0 % Final    MCV 08/29/2024 98  78 - 100 fL Final    MCH 08/29/2024 32.7  26.5 - 33.0 pg Final    MCHC 08/29/2024 33.3  31.5 - 36.5 g/dL Final    RDW 08/29/2024 13.5  10.0 - 15.0 % Final    Platelet Count 08/29/2024 297  150 - 450 10e3/uL Final    Prostate Specific Antigen Screen 08/29/2024 4.25  ng/mL Final    No reference ranges have been established for patients over 80 years.     Home blood pressure and heart rate readings were reviewed as well.    Signed Electronically by: Cabrera Bradshaw MD

## 2024-10-14 ENCOUNTER — MYC MEDICAL ADVICE (OUTPATIENT)
Dept: FAMILY MEDICINE | Facility: CLINIC | Age: 84
End: 2024-10-14
Payer: COMMERCIAL

## 2024-10-14 ENCOUNTER — TELEPHONE (OUTPATIENT)
Dept: FAMILY MEDICINE | Facility: CLINIC | Age: 84
End: 2024-10-14
Payer: COMMERCIAL

## 2024-10-14 NOTE — TELEPHONE ENCOUNTER
Patient Quality Outreach    Patient is due for the following:   Hypertension -  BP check    Next Steps:   See below    Type of outreach:    Sent letter.    Next Steps:  Reach out within 90 days via  done .    Max number of attempts reached: Yes. Will try again in 90 days if patient still on fail list.    Questions for provider review:    None           Nancy Harper [facility]  Chart routed to Closing encounter.

## 2024-10-14 NOTE — LETTER
October 14, 2024    To  Samy Henry  [address] [place] [address]    Your team at [facility] cares about your health. We have reviewed your chart and based on our findings; we are making the following recommendations to better manage your health.     You are in particular need of attention regarding the following:     HYPERTENSION FOLLOW UP: Use your home Blood Pressure monitor and call us with your results or send them through [facility].    If you have already completed these items, please contact the clinic via phone or   [facility] so your care team can review and update your records. Thank you for   choosing [facility] for your healthcare needs. For any questions,   concerns, or to schedule an appointment please contact our clinic.    Healthy Regards,      Your [facility] Care Team

## 2024-10-15 ENCOUNTER — MYC MEDICAL ADVICE (OUTPATIENT)
Dept: FAMILY MEDICINE | Facility: CLINIC | Age: 84
End: 2024-10-15
Payer: COMMERCIAL

## 2024-10-16 NOTE — TELEPHONE ENCOUNTER
Routing [facility] message to provider as FYI    Patient sending BP reading from today.    Christine M Klisch, RN

## 2024-11-19 ENCOUNTER — MYC MEDICAL ADVICE (OUTPATIENT)
Dept: FAMILY MEDICINE | Facility: CLINIC | Age: 84
End: 2024-11-19
Payer: COMMERCIAL

## 2024-11-19 DIAGNOSIS — I10 HYPERTENSION GOAL BP (BLOOD PRESSURE) < 140/90: Primary | ICD-10-CM

## 2024-11-19 RX ORDER — DILTIAZEM HYDROCHLORIDE 240 MG/1
240 CAPSULE, EXTENDED RELEASE ORAL DAILY
Qty: 90 CAPSULE | Refills: 3 | Status: SHIPPED | OUTPATIENT
Start: 2024-11-19

## 2024-12-10 ENCOUNTER — OFFICE VISIT (OUTPATIENT)
Dept: FAMILY MEDICINE | Facility: CLINIC | Age: 84
End: 2024-12-10
Payer: COMMERCIAL

## 2024-12-10 VITALS
RESPIRATION RATE: 16 BRPM | OXYGEN SATURATION: 96 % | WEIGHT: 186.8 LBS | DIASTOLIC BLOOD PRESSURE: 78 MMHG | SYSTOLIC BLOOD PRESSURE: 150 MMHG | HEIGHT: 70 IN | TEMPERATURE: 97.6 F | BODY MASS INDEX: 26.74 KG/M2 | HEART RATE: 72 BPM

## 2024-12-10 DIAGNOSIS — M1A.00X0 IDIOPATHIC CHRONIC GOUT WITHOUT TOPHUS, UNSPECIFIED SITE: ICD-10-CM

## 2024-12-10 DIAGNOSIS — I25.10 CORONARY ARTERY DISEASE INVOLVING NATIVE CORONARY ARTERY OF NATIVE HEART WITHOUT ANGINA PECTORIS: ICD-10-CM

## 2024-12-10 DIAGNOSIS — J44.9 CHRONIC OBSTRUCTIVE PULMONARY DISEASE, UNSPECIFIED COPD TYPE (H): ICD-10-CM

## 2024-12-10 DIAGNOSIS — I10 HYPERTENSION GOAL BP (BLOOD PRESSURE) < 140/90: Primary | ICD-10-CM

## 2024-12-10 PROCEDURE — G2211 COMPLEX E/M VISIT ADD ON: HCPCS | Performed by: FAMILY MEDICINE

## 2024-12-10 PROCEDURE — 99213 OFFICE O/P EST LOW 20 MIN: CPT | Performed by: FAMILY MEDICINE

## 2024-12-10 RX ORDER — DILTIAZEM HYDROCHLORIDE 300 MG/1
300 CAPSULE, EXTENDED RELEASE ORAL DAILY
Qty: 30 CAPSULE | Refills: 1 | Status: SHIPPED | OUTPATIENT
Start: 2024-12-10

## 2024-12-10 NOTE — PROGRESS NOTES
Assessment & Plan       ICD-10-CM    1. Hypertension goal BP (blood pressure) < 140/90  I10 diltiazem ER (TIAZAC) 300 MG 24 hr ER beaded capsule      2. Coronary artery disease involving native coronary artery of native heart without angina pectoris  I25.10       3. Chronic obstructive pulmonary disease, unspecified COPD type (H)  J44.9       4. Idiopathic chronic gout without tophus, unspecified site  [id number].[id number]         His blood pressure is still running high on his current regimen  We discussed various options for better blood pressure control and elected to increase his diltiazem to 300 mg daily, thereby avoiding some of the other medications that he has had side effects from  He will stay on his same lisinopril  Plan a recheck in 4 weeks  Continue to check home blood pressure readings    The longitudinal plan of care for the diagnosis(es)/condition(s) as documented were addressed during this visit. Due to the added complexity in care, I will continue to support Dominic in the subsequent management and with ongoing continuity of care.      Subjective   Dominic is a 84 year old, presenting for the following health issues:  Hypertension (CHECKING AT HOME AND AVERAGE HAS BEEN 160/85/)        12/10/2024    10:08 AM   Additional Questions   Roomed by TWILA     History of Present Illness       Hypertension: He presents for follow up of hypertension.  He does check blood pressure  regularly outside of the clinic. Outside blood pressures have been over 140/90. He follows a low salt diet.     He eats 0-1 servings of fruits and vegetables daily.He consumes 1 sweetened beverage(s) daily.He exercises with enough effort to increase his heart rate 20 to 29 minutes per day.  He exercises with enough effort to increase his heart rate 4 days per week.   He is taking medications regularly.       Hypertension Follow-up    Do you check your blood pressure regularly outside of the clinic? Yes   Are you following a low salt diet? Yes  Are  your blood pressures ever more than 140 on the top number (systolic) OR more   than 90 on the bottom number (diastolic), for example 140/90? Yes  How many servings of fruits and vegetables do you eat daily?  0-1  On average, how many sweetened beverages do you drink each day (Examples: soda, juice, sweet tea, etc.  Do NOT count diet or artificially sweetened beverages)?   0  How many days per week do you exercise enough to make your heart beat faster? 4  How many minutes a day do you exercise enough to make your heart beat faster? 20 - 29  How many days per week do you miss taking your medication? 0    Home blood pressures were running high in the last month or 2.  We increased his diltiazem from 180 mg daily to 240 mg daily a few weeks ago.  His blood pressure is still generally running in the 150-160 range over 80s at home.  He previously had done well with the 240 mg dose of diltiazem and his blood pressure and heart rate were somewhat on the low side, so we had decreased the dose to 180 mg daily to go along with his 40 mg daily of lisinopril.  He has tried various other blood pressure medications including metoprolol, HCTZ, hydralazine, amlodipine, clonidine, carvedilol, spironolactone, etc., but has had side effects and a number of other issues with them.    Patient Active Problem List   Diagnosis    Colorectal polyps    Hypertension goal BP (blood pressure) < 140/90    Hyperlipidemia LDL goal <100    History of non-ST elevation myocardial infarction (NSTEMI)    FHx: prostate cancer    Essential hypertension with goal blood pressure less than 140/90    Idiopathic chronic gout without tophus, unspecified site    Coronary artery disease involving native coronary artery of native heart without angina pectoris    COPD (chronic obstructive pulmonary disease) (H)    Hyperuricemia    Actinic keratoses    Impaired fasting glucose     Current Outpatient Medications   Medication Sig Dispense Refill    ASPIRIN 81 MG PO  "TABS 1 TABLET DAILY      atorvastatin (LIPITOR) 40 MG tablet Take 1 tablet (40 mg) by mouth daily. 90 tablet 3       3    diltiazem ER (TIAZAC) 300 MG 24 hr ER beaded capsule Take 1 capsule (300 mg) by mouth daily. 30 capsule 1    lisinopril (ZESTRIL) 40 MG tablet Take 1 tablet (40 mg) by mouth daily. 90 tablet 3    MULTI-VITAMIN PO TABS 1 TABLET DAILY      nitroGLYcerin (NITROSTAT) 0.4 MG sublingual tablet Place 1 tablet (0.4 mg) under the tongue every 5 minutes as needed 25 tablet 1     No current facility-administered medications for this visit.           Review of Systems  Constitutional, HEENT, cardiovascular, pulmonary, gi and gu systems are negative, except as otherwise noted.      Objective    BP (!) 150/78   Pulse 72   Temp 97.6  F (36.4  C) (Temporal)   Resp 16   Ht 1.77 m (5' 9.69\")   Wt 84.7 kg (186 lb 12.8 oz)   SpO2 96%   BMI 27.05 kg/m    Body mass index is 27.05 kg/m .  Physical Exam   GENERAL: alert and no distress    Home blood pressure readings were reviewed.        Signed Electronically by: Cabrera Bradshaw MD    " Alert-The patient is alert, awake and responds to voice. The patient is oriented to time, place, and person. The triage nurse is able to obtain subjective information.

## 2025-02-13 ENCOUNTER — VIRTUAL VISIT (OUTPATIENT)
Dept: FAMILY MEDICINE | Facility: CLINIC | Age: 85
End: 2025-02-13
Payer: COMMERCIAL

## 2025-02-13 DIAGNOSIS — J20.9 ACUTE BRONCHITIS WITH SYMPTOMS > 10 DAYS: Primary | ICD-10-CM

## 2025-02-13 DIAGNOSIS — J44.9 CHRONIC OBSTRUCTIVE PULMONARY DISEASE, UNSPECIFIED COPD TYPE (H): ICD-10-CM

## 2025-02-13 RX ORDER — DOXYCYCLINE HYCLATE 100 MG
100 TABLET ORAL 2 TIMES DAILY
Qty: 20 TABLET | Refills: 0 | Status: SHIPPED | OUTPATIENT
Start: 2025-02-13 | End: 2025-02-23

## 2025-02-13 ASSESSMENT — ENCOUNTER SYMPTOMS
SORE THROAT: 1
SINUS PAIN: 0
SHORTNESS OF BREATH: 1
COUGH: 1
FEVER: 0
CHILLS: 0

## 2025-02-13 NOTE — PATIENT INSTRUCTIONS
Go to go to Emergency Department for any worrisome symptoms such as chest pain, worsening shortness of breath, intractable fevers, nausea, vomiting, or diarrhea.

## 2025-02-13 NOTE — PROGRESS NOTES
"  If patient has telephone visit, have they been educated on video visit as preferred visit method and offered to change to video visit? N/A    Instructions Relayed to Patient by Virtual Roomer:     Patient is active on [employer]:   Relayed following to patient: \"It looks like you are active on [employer], are you able to join the visit this way? If not, do you need us to send you a link now or would you like your provider to send a link via text or email when they are ready to initiate the visit?\"      Patient Confirmed they will join visit via: i[employer]  Reminded patient to ensure they were logged on to virtual visit by arrival time listed.   Asked if patient has flexibility to initiate visit sooner than arrival time: patient is unable to initiate visit earlier than arrival time     If pediatric virtual visit, ensured pediatric patient along with parent/guardian will be present for video visit.     Patient offered the website [email or web address] and/or phone number to [employer] Help line: [phone]  Dominic is a 84 year old who is being evaluated via a billable video visit.    How would you like to obtain your AVS? [employer]  If the video visit is dropped, the invitation should be resent by: Text to cell phone: [phone]  Will anyone else be joining your video visit? No      Assessment & Plan     1. Acute bronchitis with symptoms > 10 days (Primary)    Patient was in no acute distress throughout video visit. Intermittent cough noted.    Double-sickening is concerning for bacterial processes. He is at high risk for poor outcomes from pneumonia considering his age and underlying CVD.  In light of this, will start augmentin with doxycycline.     He was instructed to seek in-person evaluation if symptoms escalate to which he was agreeable.    - doxycycline hyclate (VIBRA-TABS) 100 MG tablet; Take 1 tablet (100 mg) by mouth 2 times daily for 10 days.  Dispense: 20 tablet; Refill: 0  - amoxicillin-clavulanate " (AUGMENTIN) 875-125 MG tablet; Take 1 tablet by mouth 2 times daily for 10 days.  Dispense: 20 tablet; Refill: 0      2. Chronic obstructive pulmonary disease, unspecified COPD type (H)  COPD dx on file but patient thinks his respiratory symtoms are 2/2 medications. Patient is not interested in starting an inhaler at this time.  Chronic condition exacerbated by acute illness.       Follow-up if symptoms worsen/fail to improve.    All questions/concerns addressed. Patient stated understanding/agreement to plan of care.        Note: Chart documentation was done in part with Dragon Voice Recognition software.  Although reviewed after completion, some word and grammatical errors may remain. Please contact author for any clarification or concerns.      Patient Instructions   Go to go to Emergency Department for any worrisome symptoms such as chest pain, worsening shortness of breath, intractable fevers, nausea, vomiting, or diarrhea.    Subjective   Dominic is a 84 year old, presenting for the following health issues:  URI (Has been felling sick on and off for 2 weeks fever chills cough body aches)      2/13/2025     3:35 PM   Additional Questions   Roomed by Mouna   Accompanied by self     Video Start Time:  5:03pm    History of Present Illness       Reason for visit:  Cold symptoms  Symptom onset:  1-2 weeks ago  Symptoms include:  Coughing, runny nose, fatigue  Symptom intensity:  Moderate  Symptom progression:  Staying the same  Had these symptoms before:  Yes  Has tried/received treatment for these symptoms:  No   He is taking medications regularly.       Intermittent cough, worse at night.  Denies SOB at rest, has some intermittent SOB with exertion though he reports that this is not new for him. (COPD dx on file but patient thinks his respiratory symtoms are 2/2 medications).   Home COVID test negative.  No known sick contacts.  No other acute concerns/symptoms at time of exam.      Review of Systems    Constitutional:  Negative for chills and fever.   HENT:  Positive for congestion and sore throat. Negative for sinus pain.    Respiratory:  Positive for cough and shortness of breath (Intermittent).    Cardiovascular:  Negative for chest pain.     Constitutional, HEENT, cardiovascular, pulmonary, gi and gu systems are negative, except as otherwise noted.    Current Outpatient Medications   Medication Sig Dispense Refill    ASPIRIN 81 MG PO TABS 1 TABLET DAILY      atorvastatin (LIPITOR) 40 MG tablet Take 1 tablet (40 mg) by mouth daily. 90 tablet 3    diltiazem ER (DILT-XR) 240 MG 24 hr ER beaded capsule Take 1 capsule (240 mg) by mouth daily.      lisinopril (ZESTRIL) 40 MG tablet Take 1 tablet (40 mg) by mouth daily. 90 tablet 3    MULTI-VITAMIN PO TABS 1 TABLET DAILY      nitroGLYcerin (NITROSTAT) 0.4 MG sublingual tablet Place 1 tablet (0.4 mg) under the tongue every 5 minutes as needed 25 tablet 1    spironolactone (ALDACTONE) 25 MG tablet Take 1 tablet (25 mg) by mouth daily. 90 tablet 2     No current facility-administered medications for this visit.     Past Medical History:   Diagnosis Date    CAD (coronary artery disease) 10/2013    s/p 3 stents -- NL stress test 12/4/20    Colorectal polyps 08/03/2010    anal polyp removed    COPD (chronic obstructive pulmonary disease) (H) 4/2/2021    FHx: prostate cancer     father    Gout 02/2008    Hyperlipidemia LDL goal < 100 04/2006    Hypertension goal BP (blood pressure) < 140/90 05/2002    NSTEMI (non-ST elevation myocardial infarction) (H) 10/2013    Squamous cell carcinoma 05/2008    RT CHEEK       Past Surgical History:   Procedure Laterality Date    CATARACT IOL, RT/LT  06/2009    right    CATARACT IOL, RT/LT  08/13    left    COLONOSCOPY      COLONOSCOPY N/A 8/31/2015    Procedure: COLONOSCOPY;  Surgeon: Francisco Horowitz MD;  Location: [facility] OR    COLONOSCOPY  09/29/2020    COLONOSCOPY WITH CO2 INSUFFLATION N/A 8/31/2015    Procedure: COLONOSCOPY WITH  CO2 INSUFFLATION;  Surgeon: Francisco Horowitz MD;  Location: [facility] OR    DAVINCI XI HERNIORRHAPHY INGUINAL Bilateral 2024    Procedure: HERNIORRHAPHY, BILATERAL INGUINAL, ROBOT-ASSISTED, LAPAROSCOPIC, USING DA CARLOS ALBERTO XI;  Surgeon: Bull Ramirez MD;  Location: [facility] OR    removal of rectal polyp  08/10    STENT, CORONARY, SAMANTA  10/13    3 coronary stents s/p NSTEMI    TONSILLECTOMY & ADENOIDECTOMY      VASECTOMY  79       Family History   Problem Relation Age of Onset    Hypertension Mother     Breast Cancer Mother     Cancer - colorectal Father     Prostate Cancer Father     Cancer Brother         lymphoma    Hypertension Sister     Neurologic Disorder Maternal Grandmother     Cancer Paternal Grandfather        Social History     Tobacco Use    Smoking status: Former     Current packs/day: 0.00     Average packs/day: 1 pack/day for 15.0 years (15.0 ttl pk-yrs)     Types: Cigarettes     Start date: 1959     Quit date: 1974     Years since quittin.1     Passive exposure: Past    Smokeless tobacco: Never   Substance Use Topics    Alcohol use: Yes     Comment: 2 glasses of wine per day                  Objective    Vitals - Patient Reported  Pain Score: No Pain (0)        Physical Exam   GENERAL: alert and no distress  EYES: Eyes grossly normal to inspection.  No discharge or erythema, or obvious scleral/conjunctival abnormalities.  RESP: No audible wheeze,  or visible cyanosis.   Intermittent cough noted.  SKIN: Visible skin clear. No significant rash, abnormal pigmentation or lesions.  NEURO: Cranial nerves grossly intact.  Mentation and speech appropriate for age.  PSYCH: Appropriate affect, tone, and pace of words          Video-Visit Details    Type of service:  Video Visit   Video End Time:5:11 PM  Originating Location (pt. Location): Home    Distant Location (provider location):  On-site  Platform used for Video Visit: Cesar  Signed Electronically by: NYDIA Mcmahan  CNP

## 2025-02-18 ENCOUNTER — MYC MEDICAL ADVICE (OUTPATIENT)
Dept: FAMILY MEDICINE | Facility: CLINIC | Age: 85
End: 2025-02-18
Payer: COMMERCIAL

## 2025-02-19 NOTE — TELEPHONE ENCOUNTER
Spoke to the patient. He states that last night his coughing was bothersome, but he said he feels much better this morning. Offered to make him a clinic appointment but the patient declined. Advised him to call us back if he would like to schedule an appointment. Also advised that if symptoms worsen such as he becomes short of breath or has chest pain, then he should be evaluated right away in UC/ER. Pt verbalized understanding of this.    Betty PINEDA RN  [facility]

## 2025-02-20 ENCOUNTER — OFFICE VISIT (OUTPATIENT)
Dept: FAMILY MEDICINE | Facility: CLINIC | Age: 85
End: 2025-02-20
Payer: COMMERCIAL

## 2025-02-20 VITALS
HEART RATE: 80 BPM | OXYGEN SATURATION: 96 % | TEMPERATURE: 98.6 F | WEIGHT: 181 LBS | HEIGHT: 70 IN | DIASTOLIC BLOOD PRESSURE: 78 MMHG | RESPIRATION RATE: 18 BRPM | BODY MASS INDEX: 25.91 KG/M2 | SYSTOLIC BLOOD PRESSURE: 128 MMHG

## 2025-02-20 DIAGNOSIS — J44.9 CHRONIC OBSTRUCTIVE PULMONARY DISEASE, UNSPECIFIED COPD TYPE (H): ICD-10-CM

## 2025-02-20 DIAGNOSIS — J40 BRONCHITIS: Primary | ICD-10-CM

## 2025-02-20 DIAGNOSIS — I10 ESSENTIAL HYPERTENSION WITH GOAL BLOOD PRESSURE LESS THAN 140/90: ICD-10-CM

## 2025-02-20 NOTE — PROGRESS NOTES
{PROVIDER CHARTING PREFERENCE:167246}    Subjective   Dominic is a 84 year old, presenting for the following health issues:  No chief complaint on file.  {(!) Visit Details have not yet been documented.  Please enter Visit Details and then use this list to pull in documentation. (Optional):890532}  History of Present Illness       Reason for visit:  Cold symptoms  Symptom onset:  1-2 weeks ago  Symptoms include:  Coughing, runny nose, fatigue  Symptom intensity:  Moderate  Symptom progression:  Staying the same  Had these symptoms before:  Yes  Has tried/received treatment for these symptoms:  No   He is taking medications regularly.       {MA/LPN/RN Pre-Provider Visit Orders- hCG/UA/Strep (Optional):432155}  {SUPERLIST (Optional):092861}  {additonal problems for provider to add (Optional):671453}    {ROS Picklists (Optional):851389}      Objective    There were no vitals taken for this visit.  There is no height or weight on file to calculate BMI.  Physical Exam   {Exam List (Optional):307231}    {Diagnostic Test Results (Optional):835268}        Signed Electronically by: Cabrera Bradshaw MD  {Email feedback regarding this note to [email or web address]   :138945}

## 2025-02-20 NOTE — PROGRESS NOTES
Assessment & Plan       ICD-10-CM    1. Bronchitis  J40       2. Chronic obstructive pulmonary disease, unspecified COPD type (H)  J44.9       3. Essential hypertension with goal blood pressure less than 140/90  I10         He seems to be slowly improving, so advised him to finish out the full 10-day course of Augmentin and doxycycline  I suggested he use some Mucinex to help thin out his mucus and try to clear out any remaining phlegm in his lungs  Continue same blood pressure meds    If new, worsening or persistent symptoms, the patient is to call or return for a recheck.    The longitudinal plan of care for the diagnosis(es)/condition(s) as documented were addressed during this visit. Due to the added complexity in care, I will continue to support Domiinc in the subsequent management and with ongoing continuity of care.        Subjective   Dominic is a 84 year old, presenting for the following health issues:  URI      2/20/2025     3:20 PM   Additional Questions   Roomed by Indira   Accompanied by self     URI    History of Present Illness       Reason for visit:  Cold symptoms  Symptom onset:  1-2 weeks ago  Symptoms include:  Coughing, runny nose, fatigue  Symptom intensity:  Moderate  Symptom progression:  Staying the same  Had these symptoms before:  Yes  Has tried/received treatment for these symptoms:  No   He is taking medications regularly.     He had a virtual visit a week ago with one of our providers and was diagnosed with bronchitis.  He was started on a 10-day course of Augmentin and doxycycline for this, especially given his history of COPD.  He felt like he was getting somewhat better, but he has still had a cough that is bothersome.  No fever.  No significant shortness of breath.  He is on baseline medications as below.  He did recently cut back his spironolactone to 1/2 tablet daily because his blood pressure readings were doing quite well.  His blood pressure is still generally doing well.  He brings  "in home readings for review.    Patient Active Problem List   Diagnosis    Colorectal polyps    Hypertension goal BP (blood pressure) < 140/90    Hyperlipidemia LDL goal <100    History of non-ST elevation myocardial infarction (NSTEMI)    FHx: prostate cancer    Essential hypertension with goal blood pressure less than 140/90    Idiopathic chronic gout without tophus, unspecified site    Coronary artery disease involving native coronary artery of native heart without angina pectoris    COPD (chronic obstructive pulmonary disease) (H)    Hyperuricemia    Actinic keratoses    Impaired fasting glucose     Current Outpatient Medications   Medication Sig Dispense Refill    amoxicillin-clavulanate (AUGMENTIN) 875-125 MG tablet Take 1 tablet by mouth 2 times daily for 10 days. 20 tablet 0    ASPIRIN 81 MG PO TABS 1 TABLET DAILY      atorvastatin (LIPITOR) 40 MG tablet Take 1 tablet (40 mg) by mouth daily. 90 tablet 3    diltiazem ER (DILT-XR) 240 MG 24 hr ER beaded capsule Take 1 capsule (240 mg) by mouth daily.      doxycycline hyclate (VIBRA-TABS) 100 MG tablet Take 1 tablet (100 mg) by mouth 2 times daily for 10 days. 20 tablet 0    lisinopril (ZESTRIL) 40 MG tablet Take 1 tablet (40 mg) by mouth daily. 90 tablet 3    MULTI-VITAMIN PO TABS 1 TABLET DAILY      nitroGLYcerin (NITROSTAT) 0.4 MG sublingual tablet Place 1 tablet (0.4 mg) under the tongue every 5 minutes as needed 25 tablet 1    spironolactone (ALDACTONE) 25 MG tablet Take 1 tablet (25 mg) by mouth daily. 90 tablet 2     No current facility-administered medications for this visit.             Review of Systems  Noncontributory except as above.      Objective    /78   Pulse 80   Temp 98.6  F (37  C) (Temporal)   Resp 18   Ht 1.774 m (5' 9.84\")   Wt 82.1 kg (181 lb)   SpO2 96%   BMI 26.09 kg/m    Body mass index is 26.09 kg/m .  Physical Exam   GENERAL: alert and no distress  EYES: Eyes grossly normal to inspection, PERRL and conjunctivae and " Detail Level: Detailed sclerae normal  HENT: ear canals and TM's normal, nose and mouth without ulcers or lesions.  Nares are congested.  Oropharynx appears normal.  NECK: no adenopathy, no asymmetry, masses, or scars  RESP: A few light scattered crackles    His virtual visit note from last week was reviewed.  Home blood pressure readings were reviewed.        Signed Electronically by: Cabrera Bradshaw MD

## 2025-05-11 ENCOUNTER — HEALTH MAINTENANCE LETTER (OUTPATIENT)
Age: 85
End: 2025-05-11

## 2025-07-28 ENCOUNTER — MYC REFILL (OUTPATIENT)
Dept: FAMILY MEDICINE | Facility: CLINIC | Age: 85
End: 2025-07-28
Payer: COMMERCIAL

## 2025-07-28 DIAGNOSIS — I10 HYPERTENSION GOAL BP (BLOOD PRESSURE) < 140/90: ICD-10-CM

## 2025-07-29 RX ORDER — DILTIAZEM HYDROCHLORIDE 240 MG/1
240 CAPSULE, EXTENDED RELEASE ORAL DAILY
Qty: 30 CAPSULE | Refills: 0 | Status: SHIPPED | OUTPATIENT
Start: 2025-07-29

## 2025-09-03 ENCOUNTER — LAB (OUTPATIENT)
Dept: LAB | Facility: CLINIC | Age: 85
End: 2025-09-03
Payer: COMMERCIAL

## 2025-09-03 DIAGNOSIS — Z80.42 FHX: PROSTATE CANCER: Primary | ICD-10-CM

## 2025-09-03 DIAGNOSIS — Z13.6 SCREENING FOR CARDIOVASCULAR CONDITION: Primary | ICD-10-CM

## 2025-09-03 DIAGNOSIS — Z12.5 SCREENING FOR PROSTATE CANCER: ICD-10-CM

## 2025-09-03 DIAGNOSIS — I10 ESSENTIAL HYPERTENSION WITH GOAL BLOOD PRESSURE LESS THAN 140/90: ICD-10-CM

## 2025-09-03 DIAGNOSIS — Z80.42 FHX: PROSTATE CANCER: ICD-10-CM

## 2025-09-03 DIAGNOSIS — I25.10 CORONARY ARTERY DISEASE INVOLVING NATIVE CORONARY ARTERY OF NATIVE HEART WITHOUT ANGINA PECTORIS: ICD-10-CM

## 2025-09-03 LAB
ANION GAP SERPL CALCULATED.3IONS-SCNC: 12 MMOL/L (ref 7–15)
BUN SERPL-MCNC: 24.8 MG/DL (ref 8–23)
CALCIUM SERPL-MCNC: 10.1 MG/DL (ref 8.8–10.4)
CHLORIDE SERPL-SCNC: 100 MMOL/L (ref 98–107)
CHOLEST SERPL-MCNC: 142 MG/DL
CREAT SERPL-MCNC: 1.43 MG/DL (ref 0.67–1.17)
EGFRCR SERPLBLD CKD-EPI 2021: 48 ML/MIN/1.73M2
FASTING STATUS PATIENT QL REPORTED: YES
FASTING STATUS PATIENT QL REPORTED: YES
GLUCOSE SERPL-MCNC: 103 MG/DL (ref 70–99)
HCO3 SERPL-SCNC: 25 MMOL/L (ref 22–29)
HDLC SERPL-MCNC: 50 MG/DL
LDLC SERPL CALC-MCNC: 67 MG/DL
NONHDLC SERPL-MCNC: 92 MG/DL
POTASSIUM SERPL-SCNC: 4.6 MMOL/L (ref 3.4–5.3)
PSA SERPL DL<=0.01 NG/ML-MCNC: 4.66 NG/ML
SODIUM SERPL-SCNC: 137 MMOL/L (ref 135–145)
TRIGL SERPL-MCNC: 126 MG/DL

## 2025-09-03 PROCEDURE — G0103 PSA SCREENING: HCPCS

## 2025-09-03 PROCEDURE — 36415 COLL VENOUS BLD VENIPUNCTURE: CPT

## 2025-09-03 PROCEDURE — 80048 BASIC METABOLIC PNL TOTAL CA: CPT

## 2025-09-03 PROCEDURE — 80061 LIPID PANEL: CPT

## (undated) DEVICE — ENDO TROCAR BLUNT TIP KII BALLOON 12X100MM C0R47

## (undated) DEVICE — GLOVE BIOGEL PI MICRO INDICATOR UNDERGLOVE SZ 7.5 48975

## (undated) DEVICE — SU STRATAFIX PDS PLUS 3-0 SPIRAL SH 15CM SXPP1B420

## (undated) DEVICE — DRAPE IOBAN INCISE 23X17" 6650EZ

## (undated) DEVICE — ESU PENCIL W/HOLSTER

## (undated) DEVICE — LINEN TOWEL PACK X5 5464

## (undated) DEVICE — DAVINCI XI OBTURATOR BLADELESS 8MM 470359

## (undated) DEVICE — DAVINCI XI FCP BIPOLAR FENESTRATED 470205

## (undated) DEVICE — DAVINCI XI NDL DRIVER MEGA SUTURE CUT 8MM 470309

## (undated) DEVICE — ESU GROUND PAD ADULT W/CORD E7507

## (undated) DEVICE — DAVINCI XI SEAL UNIVERSAL 5-8MM 470361

## (undated) DEVICE — PACK LAP CHOLE CUSTOM ASC

## (undated) DEVICE — GLOVE BIOGEL PI MICRO SZ 7.5 48575

## (undated) DEVICE — SYR 50ML SLIP TIP W/O NDL 309654

## (undated) DEVICE — DAVINCI XI DRAPE COLUMN 470341

## (undated) DEVICE — DAVINCI XI DRAPE ARM 470015

## (undated) DEVICE — DRAPE MAYO STAND 23X54 8337

## (undated) DEVICE — BLADE CLIPPER SGL USE 9680

## (undated) DEVICE — DAVINCI HOT SHEARS TIP COVER  400180

## (undated) DEVICE — SU VICRYL 0 UR-6 27" J603H

## (undated) DEVICE — SUPPORTER SCROTAL SUSPENSORY LG LATEX

## (undated) DEVICE — SU VICRYL+ 3-0 27IN SH UND VCP416H

## (undated) DEVICE — DAVINCI XI MONOPOLAR SCISSORS HOT SHEARS 8MM 470179

## (undated) DEVICE — SOL WATER IRRIG 500ML BOTTLE 2F7113

## (undated) DEVICE — SU DERMABOND ADVANCED .7ML DNX12

## (undated) DEVICE — PREP CHLORAPREP 26ML TINTED HI-LITE ORANGE 930815

## (undated) DEVICE — GOWN XLG DISP 9545

## (undated) DEVICE — SU MONOCRYL 4-0 PS-2 27" UND Y426H

## (undated) RX ORDER — ONDANSETRON 2 MG/ML
INJECTION INTRAMUSCULAR; INTRAVENOUS
Status: DISPENSED
Start: 2024-06-05

## (undated) RX ORDER — PROPOFOL 10 MG/ML
INJECTION, EMULSION INTRAVENOUS
Status: DISPENSED
Start: 2024-06-05

## (undated) RX ORDER — REGADENOSON 0.08 MG/ML
INJECTION, SOLUTION INTRAVENOUS
Status: DISPENSED
Start: 2024-04-04

## (undated) RX ORDER — FENTANYL CITRATE 50 UG/ML
INJECTION, SOLUTION INTRAMUSCULAR; INTRAVENOUS
Status: DISPENSED
Start: 2024-06-05

## (undated) RX ORDER — FENTANYL CITRATE 50 UG/ML
INJECTION, SOLUTION INTRAMUSCULAR; INTRAVENOUS
Status: DISPENSED
Start: 2020-09-29

## (undated) RX ORDER — ALBUTEROL SULFATE 0.83 MG/ML
SOLUTION RESPIRATORY (INHALATION)
Status: DISPENSED
Start: 2021-03-01

## (undated) RX ORDER — ACETAMINOPHEN 325 MG/1
TABLET ORAL
Status: DISPENSED
Start: 2024-06-05

## (undated) RX ORDER — EPHEDRINE SULFATE 50 MG/ML
INJECTION, SOLUTION INTRAMUSCULAR; INTRAVENOUS; SUBCUTANEOUS
Status: DISPENSED
Start: 2024-06-05

## (undated) RX ORDER — DEXAMETHASONE SODIUM PHOSPHATE 4 MG/ML
INJECTION, SOLUTION INTRA-ARTICULAR; INTRALESIONAL; INTRAMUSCULAR; INTRAVENOUS; SOFT TISSUE
Status: DISPENSED
Start: 2024-06-05

## (undated) RX ORDER — CEFAZOLIN SODIUM 2 G/50ML
SOLUTION INTRAVENOUS
Status: DISPENSED
Start: 2024-06-05

## (undated) RX ORDER — DEXMEDETOMIDINE HYDROCHLORIDE 4 UG/ML
INJECTION, SOLUTION INTRAVENOUS
Status: DISPENSED
Start: 2024-06-05